# Patient Record
Sex: MALE | Race: WHITE | NOT HISPANIC OR LATINO
[De-identification: names, ages, dates, MRNs, and addresses within clinical notes are randomized per-mention and may not be internally consistent; named-entity substitution may affect disease eponyms.]

---

## 2017-01-17 ENCOUNTER — APPOINTMENT (OUTPATIENT)
Dept: ENDOCRINOLOGY | Facility: CLINIC | Age: 62
End: 2017-01-17

## 2017-01-17 VITALS
HEART RATE: 92 BPM | SYSTOLIC BLOOD PRESSURE: 140 MMHG | HEIGHT: 70 IN | BODY MASS INDEX: 35.36 KG/M2 | DIASTOLIC BLOOD PRESSURE: 80 MMHG | OXYGEN SATURATION: 98 % | WEIGHT: 247 LBS

## 2017-01-17 LAB
GLUCOSE BLDC GLUCOMTR-MCNC: 96
HBA1C MFR BLD HPLC: 7

## 2017-01-18 ENCOUNTER — MEDICATION RENEWAL (OUTPATIENT)
Age: 62
End: 2017-01-18

## 2017-01-18 LAB
ALBUMIN SERPL ELPH-MCNC: 4.9 G/DL
ALP BLD-CCNC: 73 U/L
ALT SERPL-CCNC: 42 U/L
AMYLASE/CREAT SERPL: 383 U/L
ANION GAP SERPL CALC-SCNC: 14 MMOL/L
AST SERPL-CCNC: 28 U/L
BASOPHILS # BLD AUTO: 0.01 K/UL
BASOPHILS NFR BLD AUTO: 0.1 %
BILIRUB SERPL-MCNC: 1.9 MG/DL
BUN SERPL-MCNC: 18 MG/DL
CALCIUM SERPL-MCNC: 10.3 MG/DL
CHLORIDE SERPL-SCNC: 102 MMOL/L
CHOLEST SERPL-MCNC: 119 MG/DL
CHOLEST/HDLC SERPL: 3.1 RATIO
CO2 SERPL-SCNC: 28 MMOL/L
CREAT SERPL-MCNC: 1.23 MG/DL
CREAT SPEC-SCNC: 57 MG/DL
EOSINOPHIL # BLD AUTO: 0.32 K/UL
EOSINOPHIL NFR BLD AUTO: 4.6 %
GLUCOSE SERPL-MCNC: 95 MG/DL
HCT VFR BLD CALC: 44.1 %
HDLC SERPL-MCNC: 38 MG/DL
HGB BLD-MCNC: 14.5 G/DL
IMM GRANULOCYTES NFR BLD AUTO: 0.1 %
LDLC SERPL CALC-MCNC: 55 MG/DL
LPL SERPL-CCNC: 1030 U/L
LYMPHOCYTES # BLD AUTO: 1.65 K/UL
LYMPHOCYTES NFR BLD AUTO: 23.8 %
MAN DIFF?: NORMAL
MCHC RBC-ENTMCNC: 30.3 PG
MCHC RBC-ENTMCNC: 32.9 GM/DL
MCV RBC AUTO: 92.1 FL
MICROALBUMIN 24H UR DL<=1MG/L-MCNC: 15.5 MG/DL
MICROALBUMIN/CREAT 24H UR-RTO: 273 UG/MG
MONOCYTES # BLD AUTO: 0.36 K/UL
MONOCYTES NFR BLD AUTO: 5.2 %
NEUTROPHILS # BLD AUTO: 4.58 K/UL
NEUTROPHILS NFR BLD AUTO: 66.2 %
PLATELET # BLD AUTO: 181 K/UL
POTASSIUM SERPL-SCNC: 5.1 MMOL/L
PROT SERPL-MCNC: 7.7 G/DL
PSA SERPL-MCNC: 1.11 NG/ML
RBC # BLD: 4.79 M/UL
RBC # FLD: 13.8 %
SODIUM SERPL-SCNC: 144 MMOL/L
TRIGL SERPL-MCNC: 128 MG/DL
TSH SERPL-ACNC: 2.95 UIU/ML
WBC # FLD AUTO: 6.93 K/UL

## 2017-01-22 ENCOUNTER — FORM ENCOUNTER (OUTPATIENT)
Age: 62
End: 2017-01-22

## 2017-01-23 ENCOUNTER — APPOINTMENT (OUTPATIENT)
Dept: CT IMAGING | Facility: CLINIC | Age: 62
End: 2017-01-23

## 2017-01-23 ENCOUNTER — OUTPATIENT (OUTPATIENT)
Dept: OUTPATIENT SERVICES | Facility: HOSPITAL | Age: 62
LOS: 1 days | End: 2017-01-23
Payer: COMMERCIAL

## 2017-01-23 DIAGNOSIS — Z00.8 ENCOUNTER FOR OTHER GENERAL EXAMINATION: ICD-10-CM

## 2017-01-23 PROCEDURE — 74177 CT ABD & PELVIS W/CONTRAST: CPT

## 2017-02-03 ENCOUNTER — APPOINTMENT (OUTPATIENT)
Dept: UROLOGY | Facility: CLINIC | Age: 62
End: 2017-02-03

## 2017-02-03 ENCOUNTER — FORM ENCOUNTER (OUTPATIENT)
Age: 62
End: 2017-02-03

## 2017-02-03 VITALS
TEMPERATURE: 98.2 F | SYSTOLIC BLOOD PRESSURE: 178 MMHG | HEIGHT: 70 IN | WEIGHT: 250 LBS | HEART RATE: 88 BPM | RESPIRATION RATE: 17 BRPM | DIASTOLIC BLOOD PRESSURE: 101 MMHG | BODY MASS INDEX: 35.79 KG/M2

## 2017-02-04 ENCOUNTER — APPOINTMENT (OUTPATIENT)
Dept: CT IMAGING | Facility: IMAGING CENTER | Age: 62
End: 2017-02-04

## 2017-02-04 ENCOUNTER — OUTPATIENT (OUTPATIENT)
Dept: OUTPATIENT SERVICES | Facility: HOSPITAL | Age: 62
LOS: 1 days | End: 2017-02-04
Payer: COMMERCIAL

## 2017-02-04 DIAGNOSIS — C64.9 MALIGNANT NEOPLASM OF UNSPECIFIED KIDNEY, EXCEPT RENAL PELVIS: ICD-10-CM

## 2017-02-04 PROCEDURE — 71250 CT THORAX DX C-: CPT

## 2017-03-01 ENCOUNTER — APPOINTMENT (OUTPATIENT)
Dept: UROLOGY | Facility: HOSPITAL | Age: 62
End: 2017-03-01

## 2017-03-03 ENCOUNTER — OTHER (OUTPATIENT)
Age: 62
End: 2017-03-03

## 2017-03-25 ENCOUNTER — MESSAGE (OUTPATIENT)
Age: 62
End: 2017-03-25

## 2017-03-28 ENCOUNTER — MEDICATION RENEWAL (OUTPATIENT)
Age: 62
End: 2017-03-28

## 2017-04-11 ENCOUNTER — RX RENEWAL (OUTPATIENT)
Age: 62
End: 2017-04-11

## 2017-04-24 ENCOUNTER — OUTPATIENT (OUTPATIENT)
Dept: OUTPATIENT SERVICES | Facility: HOSPITAL | Age: 62
LOS: 1 days | End: 2017-04-24
Payer: COMMERCIAL

## 2017-04-24 VITALS
HEIGHT: 70 IN | RESPIRATION RATE: 16 BRPM | OXYGEN SATURATION: 97 % | TEMPERATURE: 98 F | DIASTOLIC BLOOD PRESSURE: 88 MMHG | HEART RATE: 83 BPM | SYSTOLIC BLOOD PRESSURE: 156 MMHG | WEIGHT: 246.92 LBS

## 2017-04-24 DIAGNOSIS — C64.9 MALIGNANT NEOPLASM OF UNSPECIFIED KIDNEY, EXCEPT RENAL PELVIS: ICD-10-CM

## 2017-04-24 DIAGNOSIS — E11.9 TYPE 2 DIABETES MELLITUS WITHOUT COMPLICATIONS: ICD-10-CM

## 2017-04-24 DIAGNOSIS — E66.9 OBESITY, UNSPECIFIED: ICD-10-CM

## 2017-04-24 DIAGNOSIS — I10 ESSENTIAL (PRIMARY) HYPERTENSION: ICD-10-CM

## 2017-04-24 DIAGNOSIS — Z01.818 ENCOUNTER FOR OTHER PREPROCEDURAL EXAMINATION: ICD-10-CM

## 2017-04-24 LAB
ALBUMIN SERPL ELPH-MCNC: 4.9 G/DL — SIGNIFICANT CHANGE UP (ref 3.3–5)
ALP SERPL-CCNC: 63 U/L — SIGNIFICANT CHANGE UP (ref 40–120)
ALT FLD-CCNC: 43 U/L — HIGH (ref 4–41)
APPEARANCE UR: CLEAR — SIGNIFICANT CHANGE UP
AST SERPL-CCNC: 29 U/L — SIGNIFICANT CHANGE UP (ref 4–40)
BILIRUB SERPL-MCNC: 1.6 MG/DL — HIGH (ref 0.2–1.2)
BILIRUB UR-MCNC: NEGATIVE — SIGNIFICANT CHANGE UP
BLD GP AB SCN SERPL QL: NEGATIVE — SIGNIFICANT CHANGE UP
BLOOD UR QL VISUAL: NEGATIVE — SIGNIFICANT CHANGE UP
BUN SERPL-MCNC: 21 MG/DL — SIGNIFICANT CHANGE UP (ref 7–23)
CALCIUM SERPL-MCNC: 10.2 MG/DL — SIGNIFICANT CHANGE UP (ref 8.4–10.5)
CHLORIDE SERPL-SCNC: 98 MMOL/L — SIGNIFICANT CHANGE UP (ref 98–107)
CO2 SERPL-SCNC: 29 MMOL/L — SIGNIFICANT CHANGE UP (ref 22–31)
COLOR SPEC: SIGNIFICANT CHANGE UP
CREAT SERPL-MCNC: 1.2 MG/DL — SIGNIFICANT CHANGE UP (ref 0.5–1.3)
GLUCOSE SERPL-MCNC: 156 MG/DL — HIGH (ref 70–99)
GLUCOSE UR-MCNC: NEGATIVE — SIGNIFICANT CHANGE UP
HBA1C BLD-MCNC: 6.9 % — HIGH (ref 4–5.6)
HCT VFR BLD CALC: 43.6 % — SIGNIFICANT CHANGE UP (ref 39–50)
HGB BLD-MCNC: 14.4 G/DL — SIGNIFICANT CHANGE UP (ref 13–17)
KETONES UR-MCNC: NEGATIVE — SIGNIFICANT CHANGE UP
LEUKOCYTE ESTERASE UR-ACNC: NEGATIVE — SIGNIFICANT CHANGE UP
MCHC RBC-ENTMCNC: 30.3 PG — SIGNIFICANT CHANGE UP (ref 27–34)
MCHC RBC-ENTMCNC: 33 % — SIGNIFICANT CHANGE UP (ref 32–36)
MCV RBC AUTO: 91.8 FL — SIGNIFICANT CHANGE UP (ref 80–100)
NITRITE UR-MCNC: NEGATIVE — SIGNIFICANT CHANGE UP
PH UR: 6 — SIGNIFICANT CHANGE UP (ref 4.6–8)
PLATELET # BLD AUTO: 176 K/UL — SIGNIFICANT CHANGE UP (ref 150–400)
PMV BLD: 12.5 FL — SIGNIFICANT CHANGE UP (ref 7–13)
POTASSIUM SERPL-MCNC: 5.1 MMOL/L — SIGNIFICANT CHANGE UP (ref 3.5–5.3)
POTASSIUM SERPL-SCNC: 5.1 MMOL/L — SIGNIFICANT CHANGE UP (ref 3.5–5.3)
PROT SERPL-MCNC: 7.8 G/DL — SIGNIFICANT CHANGE UP (ref 6–8.3)
PROT UR-MCNC: 20 — SIGNIFICANT CHANGE UP
RBC # BLD: 4.75 M/UL — SIGNIFICANT CHANGE UP (ref 4.2–5.8)
RBC # FLD: 13.3 % — SIGNIFICANT CHANGE UP (ref 10.3–14.5)
RBC CASTS # UR COMP ASSIST: SIGNIFICANT CHANGE UP (ref 0–?)
RH IG SCN BLD-IMP: POSITIVE — SIGNIFICANT CHANGE UP
SODIUM SERPL-SCNC: 141 MMOL/L — SIGNIFICANT CHANGE UP (ref 135–145)
SP GR SPEC: 1.01 — SIGNIFICANT CHANGE UP (ref 1–1.03)
UROBILINOGEN FLD QL: NORMAL E.U. — SIGNIFICANT CHANGE UP (ref 0.1–0.2)
WBC # BLD: 5.61 K/UL — SIGNIFICANT CHANGE UP (ref 3.8–10.5)
WBC # FLD AUTO: 5.61 K/UL — SIGNIFICANT CHANGE UP (ref 3.8–10.5)
WBC UR QL: SIGNIFICANT CHANGE UP (ref 0–?)

## 2017-04-24 PROCEDURE — 93010 ELECTROCARDIOGRAM REPORT: CPT

## 2017-04-24 NOTE — H&P PST ADULT - LYMPHATIC
anterior cervical L/posterior cervical R/anterior cervical R/posterior cervical L/supraclavicular R/supraclavicular L

## 2017-04-24 NOTE — H&P PST ADULT - VISION (WITH CORRECTIVE LENSES IF THE PATIENT USUALLY WEARS THEM):
Partially impaired: cannot see medication labels or newsprint, but can see obstacles in path, and the surrounding layout; can count fingers at arm's length/readers

## 2017-04-24 NOTE — H&P PST ADULT - NEGATIVE GENERAL GENITOURINARY SYMPTOMS
no dysuria/no bladder infections/no renal colic/no hematuria mass on my left kidney, refer to hpi/no dysuria/no bladder infections/no hematuria/no renal colic

## 2017-04-24 NOTE — H&P PST ADULT - PROBLEM SELECTOR PLAN 4
Fs on admit  pt instructed to take Lantus 28units 5/2/17 & last oral diabetic medications on 5/2/17  pt verbalized understanding

## 2017-04-24 NOTE — H&P PST ADULT - HISTORY OF PRESENT ILLNESS
62y/o male presents for preop eval for scheduled laparoscopic left partial nephrectomy, possible open radial on 5/3/17.  Pt states incidental finding on imaging for elevated liver enzymes few months ago.  Preop dx Malignant neoplasm of kidney.

## 2017-04-24 NOTE — H&P PST ADULT - NSANTHOSAYNRD_GEN_A_CORE
No. JOSÉ MIGUEL screening performed.  STOP BANG Legend: 0-2 = LOW Risk; 3-4 = INTERMEDIATE Risk; 5-8 = HIGH Risk

## 2017-04-24 NOTE — H&P PST ADULT - FAMILY HISTORY
Mother  Still living? Yes, Estimated age: Age Unknown  Family history of hypertension, Age at diagnosis: Age Unknown     Father  Still living? Unknown  Family history of diabetes mellitus, Age at diagnosis: Age Unknown

## 2017-04-24 NOTE — H&P PST ADULT - PROBLEM SELECTOR PLAN 3
pt with three positives on stop bang questionnaire  JOSÉ MIGUEL precaution recommended   OR booking notified via fax

## 2017-04-24 NOTE — H&P PST ADULT - PMH
Diabetes mellitus  type II  Dyslipidemia    Hypertension    Malignant neoplasm of unspecified kidney, except renal pelvis    Obesity

## 2017-04-24 NOTE — H&P PST ADULT - PROBLEM SELECTOR PLAN 1
scheduled laparoscopic left partial nephrectomy, possible open radial on 5/3/17.  preop instructions, gi prophylaxis & surgical soap given  pt verbalized understanding  abo on admit

## 2017-04-24 NOTE — H&P PST ADULT - ABILITY TO HEAR (WITH HEARING AID OR HEARING APPLIANCE IF NORMALLY USED):
mild hearing loss in right ear/Mildly to Moderately Impaired: difficulty hearing in some environments or speaker may need to increase volume or speak distinctly

## 2017-04-26 ENCOUNTER — MEDICATION RENEWAL (OUTPATIENT)
Age: 62
End: 2017-04-26

## 2017-04-26 ENCOUNTER — APPOINTMENT (OUTPATIENT)
Dept: INTERNAL MEDICINE | Facility: CLINIC | Age: 62
End: 2017-04-26

## 2017-04-26 VITALS
TEMPERATURE: 98 F | HEIGHT: 70 IN | DIASTOLIC BLOOD PRESSURE: 90 MMHG | HEART RATE: 80 BPM | WEIGHT: 241 LBS | BODY MASS INDEX: 34.5 KG/M2 | OXYGEN SATURATION: 98 % | RESPIRATION RATE: 17 BRPM | SYSTOLIC BLOOD PRESSURE: 152 MMHG

## 2017-04-26 LAB
BACTERIA UR CULT: SIGNIFICANT CHANGE UP
SPECIMEN SOURCE: SIGNIFICANT CHANGE UP

## 2017-05-03 ENCOUNTER — INPATIENT (INPATIENT)
Facility: HOSPITAL | Age: 62
LOS: 1 days | Discharge: HOME | End: 2017-05-05
Attending: UROLOGY | Admitting: UROLOGY
Payer: COMMERCIAL

## 2017-05-03 ENCOUNTER — APPOINTMENT (OUTPATIENT)
Dept: UROLOGY | Facility: HOSPITAL | Age: 62
End: 2017-05-03

## 2017-05-03 ENCOUNTER — MESSAGE (OUTPATIENT)
Age: 62
End: 2017-05-03

## 2017-05-03 ENCOUNTER — RESULT REVIEW (OUTPATIENT)
Age: 62
End: 2017-05-03

## 2017-05-03 VITALS
TEMPERATURE: 98 F | HEART RATE: 79 BPM | RESPIRATION RATE: 14 BRPM | SYSTOLIC BLOOD PRESSURE: 156 MMHG | HEIGHT: 70 IN | DIASTOLIC BLOOD PRESSURE: 83 MMHG | WEIGHT: 246.92 LBS | OXYGEN SATURATION: 97 %

## 2017-05-03 DIAGNOSIS — C64.9 MALIGNANT NEOPLASM OF UNSPECIFIED KIDNEY, EXCEPT RENAL PELVIS: ICD-10-CM

## 2017-05-03 LAB — RH IG SCN BLD-IMP: POSITIVE — SIGNIFICANT CHANGE UP

## 2017-05-03 PROCEDURE — 76998 US GUIDE INTRAOP: CPT | Mod: 26

## 2017-05-03 PROCEDURE — 88331 PATH CONSLTJ SURG 1 BLK 1SPC: CPT | Mod: 26

## 2017-05-03 PROCEDURE — 88305 TISSUE EXAM BY PATHOLOGIST: CPT | Mod: 26

## 2017-05-03 PROCEDURE — 50543 LAPARO PARTIAL NEPHRECTOMY: CPT | Mod: LT

## 2017-05-03 PROCEDURE — 88307 TISSUE EXAM BY PATHOLOGIST: CPT | Mod: 26

## 2017-05-03 RX ORDER — OXYCODONE HYDROCHLORIDE 5 MG/1
5 TABLET ORAL EVERY 4 HOURS
Qty: 0 | Refills: 0 | Status: DISCONTINUED | OUTPATIENT
Start: 2017-05-03 | End: 2017-05-05

## 2017-05-03 RX ORDER — OXYCODONE HYDROCHLORIDE 5 MG/1
10 TABLET ORAL EVERY 4 HOURS
Qty: 0 | Refills: 0 | Status: DISCONTINUED | OUTPATIENT
Start: 2017-05-03 | End: 2017-05-05

## 2017-05-03 RX ORDER — DEXTROSE 50 % IN WATER 50 %
25 SYRINGE (ML) INTRAVENOUS ONCE
Qty: 0 | Refills: 0 | Status: DISCONTINUED | OUTPATIENT
Start: 2017-05-03 | End: 2017-05-05

## 2017-05-03 RX ORDER — DEXTROSE 50 % IN WATER 50 %
1 SYRINGE (ML) INTRAVENOUS ONCE
Qty: 0 | Refills: 0 | Status: DISCONTINUED | OUTPATIENT
Start: 2017-05-03 | End: 2017-05-05

## 2017-05-03 RX ORDER — DEXTROSE 50 % IN WATER 50 %
12.5 SYRINGE (ML) INTRAVENOUS ONCE
Qty: 0 | Refills: 0 | Status: DISCONTINUED | OUTPATIENT
Start: 2017-05-03 | End: 2017-05-05

## 2017-05-03 RX ORDER — HEPARIN SODIUM 5000 [USP'U]/ML
5000 INJECTION INTRAVENOUS; SUBCUTANEOUS EVERY 8 HOURS
Qty: 0 | Refills: 0 | Status: DISCONTINUED | OUTPATIENT
Start: 2017-05-03 | End: 2017-05-05

## 2017-05-03 RX ORDER — SODIUM CHLORIDE 9 MG/ML
1000 INJECTION, SOLUTION INTRAVENOUS
Qty: 0 | Refills: 0 | Status: DISCONTINUED | OUTPATIENT
Start: 2017-05-03 | End: 2017-05-05

## 2017-05-03 RX ORDER — ACETAMINOPHEN 500 MG
650 TABLET ORAL EVERY 6 HOURS
Qty: 0 | Refills: 0 | Status: DISCONTINUED | OUTPATIENT
Start: 2017-05-03 | End: 2017-05-05

## 2017-05-03 RX ORDER — ONDANSETRON 8 MG/1
4 TABLET, FILM COATED ORAL ONCE
Qty: 0 | Refills: 0 | Status: DISCONTINUED | OUTPATIENT
Start: 2017-05-03 | End: 2017-05-03

## 2017-05-03 RX ORDER — INSULIN LISPRO 100/ML
VIAL (ML) SUBCUTANEOUS AT BEDTIME
Qty: 0 | Refills: 0 | Status: DISCONTINUED | OUTPATIENT
Start: 2017-05-03 | End: 2017-05-05

## 2017-05-03 RX ORDER — SODIUM CHLORIDE 9 MG/ML
1000 INJECTION INTRAMUSCULAR; INTRAVENOUS; SUBCUTANEOUS ONCE
Qty: 0 | Refills: 0 | Status: COMPLETED | OUTPATIENT
Start: 2017-05-03 | End: 2017-05-03

## 2017-05-03 RX ORDER — ONDANSETRON 8 MG/1
4 TABLET, FILM COATED ORAL EVERY 6 HOURS
Qty: 0 | Refills: 0 | Status: DISCONTINUED | OUTPATIENT
Start: 2017-05-03 | End: 2017-05-05

## 2017-05-03 RX ORDER — INSULIN LISPRO 100/ML
VIAL (ML) SUBCUTANEOUS
Qty: 0 | Refills: 0 | Status: DISCONTINUED | OUTPATIENT
Start: 2017-05-03 | End: 2017-05-05

## 2017-05-03 RX ORDER — HYDROMORPHONE HYDROCHLORIDE 2 MG/ML
0.5 INJECTION INTRAMUSCULAR; INTRAVENOUS; SUBCUTANEOUS EVERY 4 HOURS
Qty: 0 | Refills: 0 | Status: DISCONTINUED | OUTPATIENT
Start: 2017-05-03 | End: 2017-05-03

## 2017-05-03 RX ORDER — SODIUM CHLORIDE 9 MG/ML
1000 INJECTION, SOLUTION INTRAVENOUS
Qty: 0 | Refills: 0 | Status: DISCONTINUED | OUTPATIENT
Start: 2017-05-03 | End: 2017-05-04

## 2017-05-03 RX ORDER — HYDROMORPHONE HYDROCHLORIDE 2 MG/ML
0.5 INJECTION INTRAMUSCULAR; INTRAVENOUS; SUBCUTANEOUS
Qty: 0 | Refills: 0 | Status: DISCONTINUED | OUTPATIENT
Start: 2017-05-03 | End: 2017-05-05

## 2017-05-03 RX ORDER — LISINOPRIL 2.5 MG/1
20 TABLET ORAL DAILY
Qty: 0 | Refills: 0 | Status: DISCONTINUED | OUTPATIENT
Start: 2017-05-03 | End: 2017-05-04

## 2017-05-03 RX ORDER — GLUCAGON INJECTION, SOLUTION 0.5 MG/.1ML
1 INJECTION, SOLUTION SUBCUTANEOUS ONCE
Qty: 0 | Refills: 0 | Status: DISCONTINUED | OUTPATIENT
Start: 2017-05-03 | End: 2017-05-05

## 2017-05-03 RX ORDER — INSULIN GLARGINE 100 [IU]/ML
12 INJECTION, SOLUTION SUBCUTANEOUS AT BEDTIME
Qty: 0 | Refills: 0 | Status: DISCONTINUED | OUTPATIENT
Start: 2017-05-03 | End: 2017-05-04

## 2017-05-03 RX ORDER — DOCUSATE SODIUM 100 MG
100 CAPSULE ORAL THREE TIMES A DAY
Qty: 0 | Refills: 0 | Status: DISCONTINUED | OUTPATIENT
Start: 2017-05-03 | End: 2017-05-05

## 2017-05-03 RX ORDER — SENNA PLUS 8.6 MG/1
2 TABLET ORAL AT BEDTIME
Qty: 0 | Refills: 0 | Status: DISCONTINUED | OUTPATIENT
Start: 2017-05-03 | End: 2017-05-05

## 2017-05-03 RX ORDER — ATORVASTATIN CALCIUM 80 MG/1
40 TABLET, FILM COATED ORAL AT BEDTIME
Qty: 0 | Refills: 0 | Status: DISCONTINUED | OUTPATIENT
Start: 2017-05-03 | End: 2017-05-05

## 2017-05-03 RX ORDER — SODIUM CHLORIDE 9 MG/ML
1000 INJECTION, SOLUTION INTRAVENOUS
Qty: 0 | Refills: 0 | Status: DISCONTINUED | OUTPATIENT
Start: 2017-05-03 | End: 2017-05-03

## 2017-05-03 RX ADMIN — SODIUM CHLORIDE 1000 MILLILITER(S): 9 INJECTION INTRAMUSCULAR; INTRAVENOUS; SUBCUTANEOUS at 23:40

## 2017-05-03 RX ADMIN — HEPARIN SODIUM 5000 UNIT(S): 5000 INJECTION INTRAVENOUS; SUBCUTANEOUS at 22:06

## 2017-05-03 RX ADMIN — ATORVASTATIN CALCIUM 40 MILLIGRAM(S): 80 TABLET, FILM COATED ORAL at 22:06

## 2017-05-03 RX ADMIN — SODIUM CHLORIDE 125 MILLILITER(S): 9 INJECTION, SOLUTION INTRAVENOUS at 14:39

## 2017-05-03 RX ADMIN — Medication 100 MILLIGRAM(S): at 22:06

## 2017-05-03 RX ADMIN — Medication 1 TABLET(S): at 22:06

## 2017-05-03 RX ADMIN — INSULIN GLARGINE 12 UNIT(S): 100 INJECTION, SOLUTION SUBCUTANEOUS at 23:24

## 2017-05-03 NOTE — ASU PATIENT PROFILE, ADULT - ABILITY TO HEAR (WITH HEARING AID OR HEARING APPLIANCE IF NORMALLY USED):
Mildly to Moderately Impaired: difficulty hearing in some environments or speaker may need to increase volume or speak distinctly/mild hearing loss in right ear mild hearing loss in right ear/Adequate: hears normal conversation without difficulty

## 2017-05-03 NOTE — ASU PATIENT PROFILE, ADULT - VISION (WITH CORRECTIVE LENSES IF THE PATIENT USUALLY WEARS THEM):
Partially impaired: cannot see medication labels or newsprint, but can see obstacles in path, and the surrounding layout; can count fingers at arm's length/readers Normal vision: sees adequately in most situations; can see medication labels, newsprint/readers

## 2017-05-04 ENCOUNTER — TRANSCRIPTION ENCOUNTER (OUTPATIENT)
Age: 62
End: 2017-05-04

## 2017-05-04 LAB
BASOPHILS # BLD AUTO: 0 K/UL — SIGNIFICANT CHANGE UP (ref 0–0.2)
BASOPHILS # BLD AUTO: 0.01 K/UL — SIGNIFICANT CHANGE UP (ref 0–0.2)
BASOPHILS NFR BLD AUTO: 0 % — SIGNIFICANT CHANGE UP (ref 0–2)
BASOPHILS NFR BLD AUTO: 0.1 % — SIGNIFICANT CHANGE UP (ref 0–2)
BUN SERPL-MCNC: 36 MG/DL — HIGH (ref 7–23)
BUN SERPL-MCNC: 41 MG/DL — HIGH (ref 7–23)
CALCIUM SERPL-MCNC: 8.3 MG/DL — LOW (ref 8.4–10.5)
CALCIUM SERPL-MCNC: 8.8 MG/DL — SIGNIFICANT CHANGE UP (ref 8.4–10.5)
CHLORIDE SERPL-SCNC: 103 MMOL/L — SIGNIFICANT CHANGE UP (ref 98–107)
CHLORIDE SERPL-SCNC: 98 MMOL/L — SIGNIFICANT CHANGE UP (ref 98–107)
CO2 SERPL-SCNC: 25 MMOL/L — SIGNIFICANT CHANGE UP (ref 22–31)
CO2 SERPL-SCNC: 27 MMOL/L — SIGNIFICANT CHANGE UP (ref 22–31)
CREAT SERPL-MCNC: 1.78 MG/DL — HIGH (ref 0.5–1.3)
CREAT SERPL-MCNC: 1.89 MG/DL — HIGH (ref 0.5–1.3)
EOSINOPHIL # BLD AUTO: 0 K/UL — SIGNIFICANT CHANGE UP (ref 0–0.5)
EOSINOPHIL # BLD AUTO: 0 K/UL — SIGNIFICANT CHANGE UP (ref 0–0.5)
EOSINOPHIL NFR BLD AUTO: 0 % — SIGNIFICANT CHANGE UP (ref 0–6)
EOSINOPHIL NFR BLD AUTO: 0 % — SIGNIFICANT CHANGE UP (ref 0–6)
GLUCOSE SERPL-MCNC: 227 MG/DL — HIGH (ref 70–99)
GLUCOSE SERPL-MCNC: 244 MG/DL — HIGH (ref 70–99)
HCT VFR BLD CALC: 29.4 % — LOW (ref 39–50)
HCT VFR BLD CALC: 31.6 % — LOW (ref 39–50)
HGB BLD-MCNC: 10.4 G/DL — LOW (ref 13–17)
HGB BLD-MCNC: 9.9 G/DL — LOW (ref 13–17)
IMM GRANULOCYTES NFR BLD AUTO: 0.2 % — SIGNIFICANT CHANGE UP (ref 0–1.5)
IMM GRANULOCYTES NFR BLD AUTO: 0.4 % — SIGNIFICANT CHANGE UP (ref 0–1.5)
LYMPHOCYTES # BLD AUTO: 0.96 K/UL — LOW (ref 1–3.3)
LYMPHOCYTES # BLD AUTO: 1.36 K/UL — SIGNIFICANT CHANGE UP (ref 1–3.3)
LYMPHOCYTES # BLD AUTO: 16.3 % — SIGNIFICANT CHANGE UP (ref 13–44)
LYMPHOCYTES # BLD AUTO: 8.6 % — LOW (ref 13–44)
MCHC RBC-ENTMCNC: 30.3 PG — SIGNIFICANT CHANGE UP (ref 27–34)
MCHC RBC-ENTMCNC: 30.7 PG — SIGNIFICANT CHANGE UP (ref 27–34)
MCHC RBC-ENTMCNC: 32.9 % — SIGNIFICANT CHANGE UP (ref 32–36)
MCHC RBC-ENTMCNC: 33.7 % — SIGNIFICANT CHANGE UP (ref 32–36)
MCV RBC AUTO: 91.3 FL — SIGNIFICANT CHANGE UP (ref 80–100)
MCV RBC AUTO: 92.1 FL — SIGNIFICANT CHANGE UP (ref 80–100)
MONOCYTES # BLD AUTO: 0.49 K/UL — SIGNIFICANT CHANGE UP (ref 0–0.9)
MONOCYTES # BLD AUTO: 0.63 K/UL — SIGNIFICANT CHANGE UP (ref 0–0.9)
MONOCYTES NFR BLD AUTO: 4.4 % — SIGNIFICANT CHANGE UP (ref 2–14)
MONOCYTES NFR BLD AUTO: 7.5 % — SIGNIFICANT CHANGE UP (ref 2–14)
NEUTROPHILS # BLD AUTO: 6.34 K/UL — SIGNIFICANT CHANGE UP (ref 1.8–7.4)
NEUTROPHILS # BLD AUTO: 9.72 K/UL — HIGH (ref 1.8–7.4)
NEUTROPHILS NFR BLD AUTO: 75.9 % — SIGNIFICANT CHANGE UP (ref 43–77)
NEUTROPHILS NFR BLD AUTO: 86.6 % — HIGH (ref 43–77)
PLATELET # BLD AUTO: 162 K/UL — SIGNIFICANT CHANGE UP (ref 150–400)
PLATELET # BLD AUTO: 171 K/UL — SIGNIFICANT CHANGE UP (ref 150–400)
PMV BLD: 12.1 FL — SIGNIFICANT CHANGE UP (ref 7–13)
PMV BLD: 12.3 FL — SIGNIFICANT CHANGE UP (ref 7–13)
POTASSIUM SERPL-MCNC: 5.4 MMOL/L — HIGH (ref 3.5–5.3)
POTASSIUM SERPL-MCNC: 5.5 MMOL/L — HIGH (ref 3.5–5.3)
POTASSIUM SERPL-SCNC: 5.4 MMOL/L — HIGH (ref 3.5–5.3)
POTASSIUM SERPL-SCNC: 5.5 MMOL/L — HIGH (ref 3.5–5.3)
RBC # BLD: 3.22 M/UL — LOW (ref 4.2–5.8)
RBC # BLD: 3.43 M/UL — LOW (ref 4.2–5.8)
RBC # FLD: 13.1 % — SIGNIFICANT CHANGE UP (ref 10.3–14.5)
RBC # FLD: 13.1 % — SIGNIFICANT CHANGE UP (ref 10.3–14.5)
SODIUM SERPL-SCNC: 136 MMOL/L — SIGNIFICANT CHANGE UP (ref 135–145)
SODIUM SERPL-SCNC: 139 MMOL/L — SIGNIFICANT CHANGE UP (ref 135–145)
WBC # BLD: 11.21 K/UL — HIGH (ref 3.8–10.5)
WBC # BLD: 8.36 K/UL — SIGNIFICANT CHANGE UP (ref 3.8–10.5)
WBC # FLD AUTO: 11.21 K/UL — HIGH (ref 3.8–10.5)
WBC # FLD AUTO: 8.36 K/UL — SIGNIFICANT CHANGE UP (ref 3.8–10.5)

## 2017-05-04 PROCEDURE — 99223 1ST HOSP IP/OBS HIGH 75: CPT

## 2017-05-04 RX ORDER — SENNA PLUS 8.6 MG/1
2 TABLET ORAL
Qty: 0 | Refills: 0 | DISCHARGE
Start: 2017-05-04

## 2017-05-04 RX ORDER — OXYCODONE HYDROCHLORIDE 5 MG/1
2 TABLET ORAL
Qty: 24 | Refills: 0
Start: 2017-05-04 | End: 2017-05-07

## 2017-05-04 RX ORDER — INSULIN GLARGINE 100 [IU]/ML
30 INJECTION, SOLUTION SUBCUTANEOUS AT BEDTIME
Qty: 0 | Refills: 0 | Status: DISCONTINUED | OUTPATIENT
Start: 2017-05-04 | End: 2017-05-05

## 2017-05-04 RX ORDER — SODIUM CHLORIDE 9 MG/ML
1000 INJECTION INTRAMUSCULAR; INTRAVENOUS; SUBCUTANEOUS
Qty: 0 | Refills: 0 | Status: DISCONTINUED | OUTPATIENT
Start: 2017-05-04 | End: 2017-05-05

## 2017-05-04 RX ORDER — LISINOPRIL 2.5 MG/1
1 TABLET ORAL
Qty: 0 | Refills: 0 | COMMUNITY

## 2017-05-04 RX ORDER — DOCUSATE SODIUM 100 MG
1 CAPSULE ORAL
Qty: 0 | Refills: 0 | DISCHARGE
Start: 2017-05-04

## 2017-05-04 RX ADMIN — Medication 100 MILLIGRAM(S): at 14:08

## 2017-05-04 RX ADMIN — OXYCODONE HYDROCHLORIDE 10 MILLIGRAM(S): 5 TABLET ORAL at 22:10

## 2017-05-04 RX ADMIN — Medication 100 MILLIGRAM(S): at 06:09

## 2017-05-04 RX ADMIN — SODIUM CHLORIDE 125 MILLILITER(S): 9 INJECTION INTRAMUSCULAR; INTRAVENOUS; SUBCUTANEOUS at 14:08

## 2017-05-04 RX ADMIN — Medication 10 MILLIGRAM(S): at 07:28

## 2017-05-04 RX ADMIN — HEPARIN SODIUM 5000 UNIT(S): 5000 INJECTION INTRAVENOUS; SUBCUTANEOUS at 06:09

## 2017-05-04 RX ADMIN — OXYCODONE HYDROCHLORIDE 5 MILLIGRAM(S): 5 TABLET ORAL at 11:24

## 2017-05-04 RX ADMIN — Medication 4: at 13:29

## 2017-05-04 RX ADMIN — Medication 6: at 09:12

## 2017-05-04 RX ADMIN — Medication 100 MILLIGRAM(S): at 21:36

## 2017-05-04 RX ADMIN — OXYCODONE HYDROCHLORIDE 5 MILLIGRAM(S): 5 TABLET ORAL at 12:20

## 2017-05-04 RX ADMIN — ATORVASTATIN CALCIUM 40 MILLIGRAM(S): 80 TABLET, FILM COATED ORAL at 21:35

## 2017-05-04 RX ADMIN — OXYCODONE HYDROCHLORIDE 10 MILLIGRAM(S): 5 TABLET ORAL at 16:38

## 2017-05-04 RX ADMIN — Medication 1 TABLET(S): at 11:24

## 2017-05-04 RX ADMIN — HEPARIN SODIUM 5000 UNIT(S): 5000 INJECTION INTRAVENOUS; SUBCUTANEOUS at 21:35

## 2017-05-04 RX ADMIN — INSULIN GLARGINE 30 UNIT(S): 100 INJECTION, SOLUTION SUBCUTANEOUS at 22:38

## 2017-05-04 RX ADMIN — OXYCODONE HYDROCHLORIDE 10 MILLIGRAM(S): 5 TABLET ORAL at 17:23

## 2017-05-04 RX ADMIN — Medication 6: at 17:43

## 2017-05-04 RX ADMIN — HEPARIN SODIUM 5000 UNIT(S): 5000 INJECTION INTRAVENOUS; SUBCUTANEOUS at 14:08

## 2017-05-04 RX ADMIN — OXYCODONE HYDROCHLORIDE 10 MILLIGRAM(S): 5 TABLET ORAL at 21:36

## 2017-05-04 NOTE — DISCHARGE NOTE ADULT - PLAN OF CARE
Surgical Recovery Drink plenty of fluids.  No heavy lifting (greater than 10 pounds) or straining for 4 to 6 weeks.  You may shower, just pat white strips dry, they will fall off in 10 to 14 days.  Do not drive when taking pain medication.  Call the office if you have fever greater than 101, difficulty urinating, pain not relieved with pain medication, nausea/vomiting.

## 2017-05-04 NOTE — DISCHARGE NOTE ADULT - CARE PROVIDER_API CALL
Dustin Reed), Urology  [address] [place] [address]  Phone: [phone]  Fax: [phone]

## 2017-05-04 NOTE — DISCHARGE NOTE ADULT - MEDICATION SUMMARY - MEDICATIONS TO STOP TAKING
I will STOP taking the medications listed below when I get home from the hospital:    lisinopril 20 mg oral tablet  -- 1 tab(s) by mouth once a day in am

## 2017-05-04 NOTE — DISCHARGE NOTE ADULT - OTHER SIGNIFICANT FINDINGS
**  Do not continue taking your Lisinopril until you follow up with your PMD and have your kidney funtion checked. (labwork) ***

## 2017-05-04 NOTE — DISCHARGE NOTE ADULT - HOSPITAL COURSE
Underwent uncomplicated Left  lap partial nephrectomy on 5/3 .  Postoperative course uneventful except for one episode of hypotension and dizziness which responded to an IV fluid bolus in the immediate post op period.  In addition, pt's creatinine was elevated after his procedure and and as a result his  Lisinopril will be held until f/u with his PMD.   Successful TOV on POD #1,  pain controlled, ambulating.  Return of GI function on POD #1 , diet advanced without incident.   Pt d/c on POD #1 to f/u with Dr. Reed. [phone] Underwent uncomplicated Left  lap partial nephrectomy on 5/3 .  Postoperative course uneventful except for one episode of hypotension and dizziness which responded to an IV fluid bolus in the immediate post op period.  In addition, pt's creatinine was elevated after his procedure and and as a result his  Lisinopril will be held until f/u with his PMD.   Successful TOV on POD #1,  pain controlled, ambulating.  Return of GI function on POD #1 , diet advanced without incident.   Pt d/c on POD #2 to f/u with Dr. Reed. [phone]

## 2017-05-04 NOTE — DISCHARGE NOTE ADULT - INSTRUCTIONS
Drink plenty of fluids.  No heavy lifting (greater than 10 pounds) or straining for 4 to 6 weeks.  You may shower, just pat white strips dry, they will fall off in 10 to 14 days.  Do not drive when taking pain medication.  Call Dr. Reed's nurse practitioner, Jeni Joseph NP for pathology results and to schedule a follow up appointment in 3 weeks.  Call the office if you have fever greater than 101, difficulty urinating, pain not relieved with pain medication, nausea/vomiting. Call MD for any complain of difficulty urinating, bloody urine, fever and a return appointment. Call MD for any complain of difficulty urinating, bloody urine, fever and a return appointment.  Abdominal steri strip covered with 4x4 gauze and tape.

## 2017-05-04 NOTE — DISCHARGE NOTE ADULT - CARE PLAN
Principal Discharge DX:	Malignant neoplasm of unspecified kidney, except renal pelvis  Goal:	Surgical Recovery  Instructions for follow-up, activity and diet:	Drink plenty of fluids.  No heavy lifting (greater than 10 pounds) or straining for 4 to 6 weeks.  You may shower, just pat white strips dry, they will fall off in 10 to 14 days.  Do not drive when taking pain medication.  Call the office if you have fever greater than 101, difficulty urinating, pain not relieved with pain medication, nausea/vomiting.

## 2017-05-04 NOTE — DISCHARGE NOTE ADULT - CONDITIONS AT DISCHARGE
Tolerated po and fluids.  +void. Tolerated po and fluids.  +void.  Abdominal steri strip clean dry and intact covered with 4x4 gauze tape.

## 2017-05-04 NOTE — DISCHARGE NOTE ADULT - PATIENT PORTAL LINK FT
“You can access the FollowHealth Patient Portal, offered by [facility], by registering with the following website: [email or web address]”

## 2017-05-04 NOTE — DISCHARGE NOTE ADULT - MEDICATION SUMMARY - MEDICATIONS TO TAKE
I will START or STAY ON the medications listed below when I get home from the hospital:    acetaminophen-oxycodone 325 mg-5 mg oral tablet  -- 2 tab(s) by mouth every 6 hours, As Needed MDD:8  -- Caution federal law prohibits the transfer of this drug to any person other  than the person for whom it was prescribed.  May cause drowsiness.  Alcohol may intensify this effect.  Use care when operating dangerous machinery.  This prescription cannot be refilled.  This product contains acetaminophen.  Do not use  with any other product containing acetaminophen to prevent possible liver damage.  Using more of this medication than prescribed may cause serious breathing problems.    -- Indication: For pain    glipiZIDE 10 mg oral tablet  -- 1 tab(s) by mouth 2 times a day  -- Indication: For Home med    metFORMIN 1000 mg oral tablet  -- 1 tab(s) by mouth 2 times a day  -- Indication: For Home med    Lantus Solostar Pen 100 units/mL subcutaneous solution  -- 35 unit(s) subcutaneous once a day (at bedtime)  -- Indication: For Home med    atorvastatin 40 mg oral tablet  -- 1 tab(s) by mouth once a day in pm  -- Indication: For Home med    senna oral tablet  -- 2 tab(s) by mouth once a day (at bedtime), As needed, Constipation  -- Indication: For if needed    docusate sodium 100 mg oral capsule  -- 1 cap(s) by mouth 3 times a day  -- Indication: For same    multivitamin  -- 1 tab(s) by mouth once a day in am last dose 4/24/17  -- Indication: For Home med    Vitamin B12  -- 1 tab(s) by mouth once a day in am  -- Indication: For Homemed

## 2017-05-04 NOTE — DISCHARGE NOTE ADULT - ADDITIONAL INSTRUCTIONS
Call Dr. Reed's nurse practitioner, Jeni Joseph NP for pathology results and to schedule a follow up appointment in 3 weeks.   [phone]    **See your PMD within 1 week to have your kidney function checked and discuss restarting your Lisinopril **

## 2017-05-05 ENCOUNTER — TRANSCRIPTION ENCOUNTER (OUTPATIENT)
Age: 62
End: 2017-05-05

## 2017-05-05 VITALS
SYSTOLIC BLOOD PRESSURE: 145 MMHG | HEART RATE: 94 BPM | DIASTOLIC BLOOD PRESSURE: 63 MMHG | TEMPERATURE: 98 F | RESPIRATION RATE: 17 BRPM | OXYGEN SATURATION: 100 %

## 2017-05-05 RX ADMIN — HEPARIN SODIUM 5000 UNIT(S): 5000 INJECTION INTRAVENOUS; SUBCUTANEOUS at 06:01

## 2017-05-05 RX ADMIN — OXYCODONE HYDROCHLORIDE 10 MILLIGRAM(S): 5 TABLET ORAL at 06:35

## 2017-05-05 RX ADMIN — Medication 4: at 08:48

## 2017-05-05 RX ADMIN — OXYCODONE HYDROCHLORIDE 10 MILLIGRAM(S): 5 TABLET ORAL at 06:01

## 2017-05-05 RX ADMIN — Medication 100 MILLIGRAM(S): at 06:00

## 2017-05-09 LAB — SURGICAL PATHOLOGY STUDY: SIGNIFICANT CHANGE UP

## 2017-05-11 ENCOUNTER — APPOINTMENT (OUTPATIENT)
Dept: ENDOCRINOLOGY | Facility: CLINIC | Age: 62
End: 2017-05-11

## 2017-05-11 VITALS
BODY MASS INDEX: 34.65 KG/M2 | DIASTOLIC BLOOD PRESSURE: 70 MMHG | HEART RATE: 95 BPM | HEIGHT: 70 IN | SYSTOLIC BLOOD PRESSURE: 120 MMHG | OXYGEN SATURATION: 97 % | WEIGHT: 242 LBS

## 2017-05-15 ENCOUNTER — APPOINTMENT (OUTPATIENT)
Dept: INTERNAL MEDICINE | Facility: CLINIC | Age: 62
End: 2017-05-15

## 2017-05-15 VITALS
SYSTOLIC BLOOD PRESSURE: 144 MMHG | WEIGHT: 242 LBS | TEMPERATURE: 98 F | OXYGEN SATURATION: 96 % | DIASTOLIC BLOOD PRESSURE: 80 MMHG | HEART RATE: 87 BPM | RESPIRATION RATE: 17 BRPM | BODY MASS INDEX: 34.65 KG/M2 | HEIGHT: 70 IN

## 2017-05-15 RX ORDER — AMLODIPINE AND ATORVASTATIN 10; 40 MG/1; MG/1
10-40 TABLET, COATED ORAL
Qty: 90 | Refills: 1 | Status: DISCONTINUED | COMMUNITY
Start: 2017-05-05 | End: 2017-05-15

## 2017-06-01 ENCOUNTER — RX RENEWAL (OUTPATIENT)
Age: 62
End: 2017-06-01

## 2017-06-02 ENCOUNTER — APPOINTMENT (OUTPATIENT)
Dept: INTERNAL MEDICINE | Facility: CLINIC | Age: 62
End: 2017-06-02

## 2017-06-02 ENCOUNTER — APPOINTMENT (OUTPATIENT)
Dept: UROLOGY | Facility: CLINIC | Age: 62
End: 2017-06-02

## 2017-06-10 ENCOUNTER — RX RENEWAL (OUTPATIENT)
Age: 62
End: 2017-06-10

## 2017-06-19 ENCOUNTER — APPOINTMENT (OUTPATIENT)
Dept: INTERNAL MEDICINE | Facility: CLINIC | Age: 62
End: 2017-06-19

## 2017-06-19 ENCOUNTER — FORM ENCOUNTER (OUTPATIENT)
Age: 62
End: 2017-06-19

## 2017-06-19 VITALS
TEMPERATURE: 98.3 F | HEIGHT: 70 IN | DIASTOLIC BLOOD PRESSURE: 82 MMHG | HEART RATE: 88 BPM | WEIGHT: 248 LBS | BODY MASS INDEX: 35.5 KG/M2 | OXYGEN SATURATION: 97 % | SYSTOLIC BLOOD PRESSURE: 151 MMHG | RESPIRATION RATE: 17 BRPM

## 2017-06-19 RX ORDER — OXYCODONE AND ACETAMINOPHEN 5; 325 MG/1; MG/1
5-325 TABLET ORAL
Qty: 24 | Refills: 0 | Status: COMPLETED | COMMUNITY
Start: 2017-05-04

## 2017-06-20 ENCOUNTER — OUTPATIENT (OUTPATIENT)
Dept: OUTPATIENT SERVICES | Facility: HOSPITAL | Age: 62
LOS: 1 days | End: 2017-06-20
Payer: COMMERCIAL

## 2017-06-20 ENCOUNTER — APPOINTMENT (OUTPATIENT)
Dept: RADIOLOGY | Facility: CLINIC | Age: 62
End: 2017-06-20

## 2017-06-20 DIAGNOSIS — R05 COUGH: ICD-10-CM

## 2017-06-20 PROCEDURE — 71046 X-RAY EXAM CHEST 2 VIEWS: CPT

## 2017-06-27 ENCOUNTER — MESSAGE (OUTPATIENT)
Age: 62
End: 2017-06-27

## 2017-09-07 ENCOUNTER — RX RENEWAL (OUTPATIENT)
Age: 62
End: 2017-09-07

## 2017-09-18 ENCOUNTER — APPOINTMENT (OUTPATIENT)
Dept: INTERNAL MEDICINE | Facility: CLINIC | Age: 62
End: 2017-09-18
Payer: COMMERCIAL

## 2017-09-18 VITALS
DIASTOLIC BLOOD PRESSURE: 84 MMHG | OXYGEN SATURATION: 98 % | WEIGHT: 243 LBS | BODY MASS INDEX: 34.79 KG/M2 | HEART RATE: 77 BPM | RESPIRATION RATE: 17 BRPM | SYSTOLIC BLOOD PRESSURE: 140 MMHG | HEIGHT: 70 IN | TEMPERATURE: 98.2 F

## 2017-09-18 DIAGNOSIS — C64.9 MALIGNANT NEOPLASM OF UNSPECIFIED KIDNEY, EXCEPT RENAL PELVIS: ICD-10-CM

## 2017-09-18 PROCEDURE — 99396 PREV VISIT EST AGE 40-64: CPT

## 2017-09-18 RX ORDER — MULTIVITAMIN
TABLET ORAL
Refills: 0 | Status: ACTIVE | COMMUNITY

## 2017-09-18 RX ORDER — CHLORHEXIDINE GLUCONATE 4 %
1000 LIQUID (ML) TOPICAL
Refills: 0 | Status: ACTIVE | COMMUNITY

## 2017-09-18 RX ORDER — BENZONATATE 200 MG/1
200 CAPSULE ORAL
Qty: 15 | Refills: 0 | Status: DISCONTINUED | COMMUNITY
Start: 2017-06-19 | End: 2017-09-18

## 2017-09-24 LAB
ALBUMIN SERPL ELPH-MCNC: 5 G/DL
ALP BLD-CCNC: 72 U/L
ALT SERPL-CCNC: 26 U/L
ANION GAP SERPL CALC-SCNC: 16 MMOL/L
APPEARANCE: CLEAR
AST SERPL-CCNC: 17 U/L
BASOPHILS # BLD AUTO: 0.01 K/UL
BASOPHILS NFR BLD AUTO: 0.2 %
BILIRUB SERPL-MCNC: 1.5 MG/DL
BILIRUBIN URINE: NEGATIVE
BLOOD URINE: NEGATIVE
BUN SERPL-MCNC: 23 MG/DL
CALCIUM SERPL-MCNC: 9.9 MG/DL
CHLORIDE SERPL-SCNC: 101 MMOL/L
CHOLEST SERPL-MCNC: 103 MG/DL
CHOLEST/HDLC SERPL: 3.3 RATIO
CO2 SERPL-SCNC: 25 MMOL/L
COLOR: YELLOW
CREAT SERPL-MCNC: 1.26 MG/DL
CREAT SPEC-SCNC: 56 MG/DL
EOSINOPHIL # BLD AUTO: 0.34 K/UL
EOSINOPHIL NFR BLD AUTO: 5.8 %
GLUCOSE QUALITATIVE U: NORMAL MG/DL
GLUCOSE SERPL-MCNC: 97 MG/DL
HBA1C MFR BLD HPLC: 7.2 %
HCT VFR BLD CALC: 38.6 %
HCV AB SER QL: NONREACTIVE
HCV S/CO RATIO: 0.1 S/CO
HDLC SERPL-MCNC: 31 MG/DL
HGB BLD-MCNC: 12.7 G/DL
IMM GRANULOCYTES NFR BLD AUTO: 0.2 %
KETONES URINE: NEGATIVE
LDLC SERPL CALC-MCNC: 54 MG/DL
LEUKOCYTE ESTERASE URINE: NEGATIVE
LYMPHOCYTES # BLD AUTO: 1.73 K/UL
LYMPHOCYTES NFR BLD AUTO: 29.5 %
MAN DIFF?: NORMAL
MCHC RBC-ENTMCNC: 29.1 PG
MCHC RBC-ENTMCNC: 32.9 GM/DL
MCV RBC AUTO: 88.3 FL
MICROALBUMIN 24H UR DL<=1MG/L-MCNC: 6.1 MG/DL
MICROALBUMIN/CREAT 24H UR-RTO: 109 MG/G
MONOCYTES # BLD AUTO: 0.42 K/UL
MONOCYTES NFR BLD AUTO: 7.2 %
NEUTROPHILS # BLD AUTO: 3.36 K/UL
NEUTROPHILS NFR BLD AUTO: 57.1 %
NITRITE URINE: NEGATIVE
PH URINE: 5.5
PLATELET # BLD AUTO: 173 K/UL
POTASSIUM SERPL-SCNC: 4.8 MMOL/L
PROT SERPL-MCNC: 7.9 G/DL
PROTEIN URINE: NEGATIVE MG/DL
RBC # BLD: 4.37 M/UL
RBC # FLD: 14.4 %
SODIUM SERPL-SCNC: 142 MMOL/L
SPECIFIC GRAVITY URINE: 1.01
TRIGL SERPL-MCNC: 90 MG/DL
TSH SERPL-ACNC: 4.17 UIU/ML
UROBILINOGEN URINE: NORMAL MG/DL
WBC # FLD AUTO: 5.87 K/UL

## 2017-10-02 ENCOUNTER — RX RENEWAL (OUTPATIENT)
Age: 62
End: 2017-10-02

## 2017-10-23 ENCOUNTER — MEDICATION RENEWAL (OUTPATIENT)
Age: 62
End: 2017-10-23

## 2017-11-28 ENCOUNTER — OUTPATIENT (OUTPATIENT)
Dept: OUTPATIENT SERVICES | Facility: HOSPITAL | Age: 62
LOS: 1 days | End: 2017-11-28
Payer: COMMERCIAL

## 2017-11-28 ENCOUNTER — APPOINTMENT (OUTPATIENT)
Dept: UROLOGY | Facility: CLINIC | Age: 62
End: 2017-11-28
Payer: COMMERCIAL

## 2017-11-28 PROCEDURE — 99213 OFFICE O/P EST LOW 20 MIN: CPT | Mod: 25

## 2017-11-28 PROCEDURE — 76705 ECHO EXAM OF ABDOMEN: CPT | Mod: 26

## 2017-11-28 PROCEDURE — 76705 ECHO EXAM OF ABDOMEN: CPT

## 2017-12-05 DIAGNOSIS — C65.2 MALIGNANT NEOPLASM OF LEFT RENAL PELVIS: ICD-10-CM

## 2017-12-08 ENCOUNTER — APPOINTMENT (OUTPATIENT)
Dept: ENDOCRINOLOGY | Facility: CLINIC | Age: 62
End: 2017-12-08
Payer: COMMERCIAL

## 2017-12-08 VITALS
SYSTOLIC BLOOD PRESSURE: 122 MMHG | WEIGHT: 250 LBS | BODY MASS INDEX: 35.79 KG/M2 | OXYGEN SATURATION: 98 % | HEIGHT: 70 IN | DIASTOLIC BLOOD PRESSURE: 82 MMHG | HEART RATE: 83 BPM

## 2017-12-08 LAB
GLUCOSE BLDC GLUCOMTR-MCNC: 97
HBA1C MFR BLD HPLC: 6.4

## 2017-12-08 PROCEDURE — 83036 HEMOGLOBIN GLYCOSYLATED A1C: CPT | Mod: QW

## 2017-12-08 PROCEDURE — 99214 OFFICE O/P EST MOD 30 MIN: CPT

## 2017-12-08 PROCEDURE — 82962 GLUCOSE BLOOD TEST: CPT

## 2017-12-12 ENCOUNTER — APPOINTMENT (OUTPATIENT)
Dept: UROLOGY | Facility: CLINIC | Age: 62
End: 2017-12-12

## 2017-12-15 ENCOUNTER — MEDICATION RENEWAL (OUTPATIENT)
Age: 62
End: 2017-12-15

## 2018-02-27 ENCOUNTER — MEDICATION RENEWAL (OUTPATIENT)
Age: 63
End: 2018-02-27

## 2018-03-06 ENCOUNTER — RX RENEWAL (OUTPATIENT)
Age: 63
End: 2018-03-06

## 2018-04-27 ENCOUNTER — APPOINTMENT (OUTPATIENT)
Dept: ENDOCRINOLOGY | Facility: CLINIC | Age: 63
End: 2018-04-27
Payer: COMMERCIAL

## 2018-04-27 VITALS
SYSTOLIC BLOOD PRESSURE: 140 MMHG | OXYGEN SATURATION: 95 % | HEART RATE: 86 BPM | BODY MASS INDEX: 35.07 KG/M2 | DIASTOLIC BLOOD PRESSURE: 80 MMHG | HEIGHT: 70 IN | WEIGHT: 245 LBS

## 2018-04-27 LAB
GLUCOSE BLDC GLUCOMTR-MCNC: 118
HBA1C MFR BLD HPLC: 7.4

## 2018-04-27 PROCEDURE — 99214 OFFICE O/P EST MOD 30 MIN: CPT | Mod: 25

## 2018-04-27 PROCEDURE — 82962 GLUCOSE BLOOD TEST: CPT

## 2018-04-27 PROCEDURE — 95251 CONT GLUC MNTR ANALYSIS I&R: CPT

## 2018-04-27 PROCEDURE — 83036 HEMOGLOBIN GLYCOSYLATED A1C: CPT | Mod: QW

## 2018-04-27 RX ORDER — BUDESONIDE AND FORMOTEROL FUMARATE DIHYDRATE 160; 4.5 UG/1; UG/1
160-4.5 AEROSOL RESPIRATORY (INHALATION) TWICE DAILY
Qty: 1 | Refills: 0 | Status: DISCONTINUED | COMMUNITY
Start: 2017-06-19 | End: 2018-04-27

## 2018-04-30 LAB
ALBUMIN SERPL ELPH-MCNC: 4.7 G/DL
ALP BLD-CCNC: 73 U/L
ALT SERPL-CCNC: 36 U/L
ANION GAP SERPL CALC-SCNC: 12 MMOL/L
AST SERPL-CCNC: 30 U/L
BASOPHILS # BLD AUTO: 0.01 K/UL
BASOPHILS NFR BLD AUTO: 0.2 %
BILIRUB SERPL-MCNC: 1 MG/DL
BUN SERPL-MCNC: 22 MG/DL
CALCIUM SERPL-MCNC: 10.3 MG/DL
CHLORIDE SERPL-SCNC: 104 MMOL/L
CHOLEST SERPL-MCNC: 105 MG/DL
CHOLEST/HDLC SERPL: 3.5 RATIO
CO2 SERPL-SCNC: 28 MMOL/L
CREAT SERPL-MCNC: 1.34 MG/DL
EOSINOPHIL # BLD AUTO: 0.29 K/UL
EOSINOPHIL NFR BLD AUTO: 4.8 %
GLUCOSE SERPL-MCNC: 94 MG/DL
HCT VFR BLD CALC: 40.2 %
HDLC SERPL-MCNC: 30 MG/DL
HGB BLD-MCNC: 13.1 G/DL
IMM GRANULOCYTES NFR BLD AUTO: 0.2 %
LDLC SERPL CALC-MCNC: 55 MG/DL
LYMPHOCYTES # BLD AUTO: 1.46 K/UL
LYMPHOCYTES NFR BLD AUTO: 24.2 %
MAN DIFF?: NORMAL
MCHC RBC-ENTMCNC: 29.7 PG
MCHC RBC-ENTMCNC: 32.6 GM/DL
MCV RBC AUTO: 91.2 FL
MONOCYTES # BLD AUTO: 0.49 K/UL
MONOCYTES NFR BLD AUTO: 8.1 %
NEUTROPHILS # BLD AUTO: 3.78 K/UL
NEUTROPHILS NFR BLD AUTO: 62.5 %
PLATELET # BLD AUTO: 173 K/UL
POTASSIUM SERPL-SCNC: 4.6 MMOL/L
PROT SERPL-MCNC: 7.4 G/DL
RBC # BLD: 4.41 M/UL
RBC # FLD: 14.1 %
SODIUM SERPL-SCNC: 144 MMOL/L
TRIGL SERPL-MCNC: 98 MG/DL
TSH SERPL-ACNC: 4.02 UIU/ML
WBC # FLD AUTO: 6.04 K/UL

## 2018-05-15 ENCOUNTER — CLINICAL ADVICE (OUTPATIENT)
Age: 63
End: 2018-05-15

## 2018-05-21 ENCOUNTER — RX RENEWAL (OUTPATIENT)
Age: 63
End: 2018-05-21

## 2018-05-22 ENCOUNTER — MEDICATION RENEWAL (OUTPATIENT)
Age: 63
End: 2018-05-22

## 2018-06-29 ENCOUNTER — APPOINTMENT (OUTPATIENT)
Dept: ENDOCRINOLOGY | Facility: CLINIC | Age: 63
End: 2018-06-29
Payer: COMMERCIAL

## 2018-06-29 VITALS
SYSTOLIC BLOOD PRESSURE: 130 MMHG | BODY MASS INDEX: 35.07 KG/M2 | HEIGHT: 70 IN | OXYGEN SATURATION: 96 % | WEIGHT: 245 LBS | DIASTOLIC BLOOD PRESSURE: 82 MMHG | HEART RATE: 90 BPM

## 2018-06-29 PROCEDURE — 99214 OFFICE O/P EST MOD 30 MIN: CPT

## 2018-06-29 RX ORDER — EMPAGLIFLOZIN 10 MG/1
10 TABLET, FILM COATED ORAL
Qty: 90 | Refills: 3 | Status: DISCONTINUED | COMMUNITY
Start: 2018-05-22 | End: 2018-06-29

## 2018-08-13 ENCOUNTER — RX RENEWAL (OUTPATIENT)
Age: 63
End: 2018-08-13

## 2018-08-27 PROBLEM — I10 ESSENTIAL (PRIMARY) HYPERTENSION: Chronic | Status: ACTIVE | Noted: 2017-04-24

## 2018-08-27 PROBLEM — E78.5 HYPERLIPIDEMIA, UNSPECIFIED: Chronic | Status: ACTIVE | Noted: 2017-04-24

## 2018-08-27 PROBLEM — E11.9 TYPE 2 DIABETES MELLITUS WITHOUT COMPLICATIONS: Chronic | Status: ACTIVE | Noted: 2017-04-24

## 2018-08-27 PROBLEM — E66.9 OBESITY, UNSPECIFIED: Chronic | Status: ACTIVE | Noted: 2017-04-24

## 2018-08-28 ENCOUNTER — MEDICATION RENEWAL (OUTPATIENT)
Age: 63
End: 2018-08-28

## 2018-08-29 ENCOUNTER — APPOINTMENT (OUTPATIENT)
Dept: INTERNAL MEDICINE | Facility: CLINIC | Age: 63
End: 2018-08-29
Payer: COMMERCIAL

## 2018-08-29 ENCOUNTER — NON-APPOINTMENT (OUTPATIENT)
Age: 63
End: 2018-08-29

## 2018-08-29 VITALS
BODY MASS INDEX: 35.07 KG/M2 | OXYGEN SATURATION: 99 % | SYSTOLIC BLOOD PRESSURE: 130 MMHG | HEIGHT: 70 IN | TEMPERATURE: 97.9 F | WEIGHT: 245 LBS | HEART RATE: 88 BPM | DIASTOLIC BLOOD PRESSURE: 80 MMHG | RESPIRATION RATE: 17 BRPM

## 2018-08-29 DIAGNOSIS — Z01.818 ENCOUNTER FOR OTHER PREPROCEDURAL EXAMINATION: ICD-10-CM

## 2018-08-29 DIAGNOSIS — R05 COUGH: ICD-10-CM

## 2018-08-29 DIAGNOSIS — R42 DIZZINESS AND GIDDINESS: ICD-10-CM

## 2018-08-29 DIAGNOSIS — N52.9 MALE ERECTILE DYSFUNCTION, UNSPECIFIED: ICD-10-CM

## 2018-08-29 DIAGNOSIS — M54.5 LOW BACK PAIN: ICD-10-CM

## 2018-08-29 DIAGNOSIS — S83.249A OTHER TEAR OF MEDIAL MENISCUS, CURRENT INJURY, UNSPECIFIED KNEE, INITIAL ENCOUNTER: ICD-10-CM

## 2018-08-29 DIAGNOSIS — M87.059 IDIOPATHIC ASEPTIC NECROSIS OF UNSPECIFIED FEMUR: ICD-10-CM

## 2018-08-29 DIAGNOSIS — M25.569 PAIN IN UNSPECIFIED KNEE: ICD-10-CM

## 2018-08-29 DIAGNOSIS — M51.26 OTHER INTERVERTEBRAL DISC DISPLACEMENT, LUMBAR REGION: ICD-10-CM

## 2018-08-29 PROCEDURE — 99214 OFFICE O/P EST MOD 30 MIN: CPT

## 2018-08-29 PROCEDURE — 93000 ELECTROCARDIOGRAM COMPLETE: CPT

## 2018-08-30 LAB
ALBUMIN SERPL ELPH-MCNC: 4.9 G/DL
ALP BLD-CCNC: 74 U/L
ALT SERPL-CCNC: 31 U/L
ANION GAP SERPL CALC-SCNC: 14 MMOL/L
AST SERPL-CCNC: 22 U/L
BASOPHILS # BLD AUTO: 0.01 K/UL
BASOPHILS NFR BLD AUTO: 0.2 %
BILIRUB SERPL-MCNC: 1 MG/DL
BUN SERPL-MCNC: 27 MG/DL
CALCIUM SERPL-MCNC: 10.5 MG/DL
CHLORIDE SERPL-SCNC: 102 MMOL/L
CO2 SERPL-SCNC: 25 MMOL/L
CREAT SERPL-MCNC: 1.21 MG/DL
EOSINOPHIL # BLD AUTO: 0.33 K/UL
EOSINOPHIL NFR BLD AUTO: 5.2 %
GLUCOSE SERPL-MCNC: 144 MG/DL
HCT VFR BLD CALC: 41.6 %
HGB BLD-MCNC: 13.4 G/DL
IMM GRANULOCYTES NFR BLD AUTO: 0.3 %
LYMPHOCYTES # BLD AUTO: 1.58 K/UL
LYMPHOCYTES NFR BLD AUTO: 24.9 %
MAN DIFF?: NORMAL
MCHC RBC-ENTMCNC: 29.5 PG
MCHC RBC-ENTMCNC: 32.2 GM/DL
MCV RBC AUTO: 91.4 FL
MONOCYTES # BLD AUTO: 0.37 K/UL
MONOCYTES NFR BLD AUTO: 5.8 %
NEUTROPHILS # BLD AUTO: 4.04 K/UL
NEUTROPHILS NFR BLD AUTO: 63.6 %
PLATELET # BLD AUTO: 192 K/UL
POTASSIUM SERPL-SCNC: 5 MMOL/L
PROT SERPL-MCNC: 7.7 G/DL
RBC # BLD: 4.55 M/UL
RBC # FLD: 14 %
SODIUM SERPL-SCNC: 141 MMOL/L
WBC # FLD AUTO: 6.35 K/UL

## 2018-09-04 ENCOUNTER — RX RENEWAL (OUTPATIENT)
Age: 63
End: 2018-09-04

## 2018-09-15 NOTE — H&P PST ADULT - CONSTITUTIONAL DETAILS
Hospitalist Progress Note      Patient: Asuncion Ulrich Date: 9/15/2018   YOB: 1926 Admission Date: 9/8/2018   MRN: [id number] Attending: Mart Hodges MD     Hospital Day: Hospital Day: 8    Date of Service: 9/15/2018    SUBJECTIVE  Asuncion Ulrich is a 89 year old female who was admitted on 9/8/2018   No acute overnight medical or behavioral events.      OBJECTIVE  Scheduled Meds     vancomycin 250 mg 4 times per day   acetaminophen 650 mg TID   traZODone 12.5 mg Nightly   divalproex 125 mg 2 times per day   sodium chloride (PF) 2 mL 2 times per day   cholecalciferol 2,000 Units Daily     Continuous Infusions     PHYSICAL EXAM  Vital signs:    Visit Vitals  /62 ([place] [facility] [place] Supine)   Pulse 57   Temp 97.5 °F (36.4 °C) (Oral)   Resp 16   Ht 5' (1.524 m)   Wt 49.8 kg   SpO2 99%   BMI 21.44 kg/m²       I/Os:    No intake or output data in the 24 hours ending 09/15/18 1044  Constitutional: Patient appears as stated age. No apparent distress.   HENT: Normocephalic, atraumatic.   Eyes: . Conjunctivae normal. No discharge.   Neck: Normal range of motion. No tenderness. Supple. No stridor.   Cardiovascular: Normal heart rate. Normal rhythm.  Lungs: Normal breath sounds. No respiratory distress. No wheezing. No rales are heard  Abdomen:: Soft, nontender, without masses or hernias. No hepatosplenomegaly. Bowel sounds are hypo active. There is no rebound or guarding or evidence of peritonitis;  Skin: Warm. Dry. No erythema. No rash.   Extremities: Intact distal pulses. No edema. No tenderness. No cyanosis. No clubbing.   Neurologic: Awake, following commands       Labs    Recent Labs  Lab 09/14/18  1228 09/13/18  0946   WBC 6.1 8.3   HCT 44.1 43.1   HGB 14.6 14.8    375       Recent Labs  Lab 09/14/18  1228 09/13/18  0946  09/08/18  1620   SODIUM 143 140  < > 140   POTASSIUM 4.3 4.2  < > 3.9   CHLORIDE 108* 106  < > 103   CO2 25 22  < > 30   GLUCOSE 89 101*  < > 72   BUN 12 12  < >  13   CREATININE 0.72 0.72  < > 0.63   ALBUMIN  --   --   --  2.9*   AST  --   --   --  22   ALKPT  --   --   --  75   GPT  --   --   --  26   BILIRUBIN  --   --   --  0.4   MG  --  1.9  --   --    < > = values in this interval not displayed.  Test Results  Troponin: No results found      ASSESSMENT/PLAN  Asuncion Ulrich is a 89 year old female who was admitted on 9/8/2018 for BRBPR.     Bright Red Blood Per Rectum: Noted in NH and was sent here for eval ( resolved) Painless. Hgb currently WNL. Broad differential, including hemorrhoids & diverticular bleed.   -No more episodes or rectal bleeding.  -Hold home ASA     Lactic Acidosis: Resolved        H/o Right Hip Fracture  -PT/OT to minimize deconditioning     Essential HTN: currently normotensive       Dementia: w/ behavioral disturbance. POA activated.  -Seroquel was dc   - Trazodone at bedtime    - Continue on Acetaminophen and Depakote   -Frequent reorientation. Minimize use of pharmacologic restraints if possible    Cdiff: Continue on PO Vancomycin , patient does not look septic. No fever, chills , N/V or abdominal pain. Tolerating diet.    Hypokalemia: K as needed     DVT Prophylaxis: SCD/KHADIJAH    Code Status: [facility]  DVT Prophylaxis: hold     Disposition:     Discharge plan is currently return to [facility] on Monday           no distress/obese

## 2018-09-21 ENCOUNTER — APPOINTMENT (OUTPATIENT)
Dept: INTERNAL MEDICINE | Facility: CLINIC | Age: 63
End: 2018-09-21

## 2018-10-12 ENCOUNTER — APPOINTMENT (OUTPATIENT)
Dept: ENDOCRINOLOGY | Facility: CLINIC | Age: 63
End: 2018-10-12
Payer: COMMERCIAL

## 2018-10-12 VITALS
OXYGEN SATURATION: 98 % | SYSTOLIC BLOOD PRESSURE: 138 MMHG | BODY MASS INDEX: 34.93 KG/M2 | HEIGHT: 70 IN | HEART RATE: 82 BPM | WEIGHT: 244 LBS | DIASTOLIC BLOOD PRESSURE: 80 MMHG

## 2018-10-12 LAB
GLUCOSE BLDC GLUCOMTR-MCNC: 166
HBA1C MFR BLD HPLC: 7.3

## 2018-10-12 PROCEDURE — 99214 OFFICE O/P EST MOD 30 MIN: CPT

## 2018-10-18 LAB
CREAT SPEC-SCNC: 52 MG/DL
MICROALBUMIN 24H UR DL<=1MG/L-MCNC: 9.5 MG/DL
MICROALBUMIN/CREAT 24H UR-RTO: 184 MG/G

## 2018-12-14 ENCOUNTER — APPOINTMENT (OUTPATIENT)
Dept: UROLOGY | Facility: CLINIC | Age: 63
End: 2018-12-14
Payer: COMMERCIAL

## 2018-12-14 ENCOUNTER — OUTPATIENT (OUTPATIENT)
Dept: OUTPATIENT SERVICES | Facility: HOSPITAL | Age: 63
LOS: 1 days | End: 2018-12-14
Payer: COMMERCIAL

## 2018-12-14 DIAGNOSIS — R35.0 FREQUENCY OF MICTURITION: ICD-10-CM

## 2018-12-14 PROCEDURE — 76775 US EXAM ABDO BACK WALL LIM: CPT | Mod: 26

## 2018-12-14 PROCEDURE — 99214 OFFICE O/P EST MOD 30 MIN: CPT | Mod: 25

## 2018-12-14 PROCEDURE — 76775 US EXAM ABDO BACK WALL LIM: CPT

## 2018-12-18 DIAGNOSIS — C64.9 MALIGNANT NEOPLASM OF UNSPECIFIED KIDNEY, EXCEPT RENAL PELVIS: ICD-10-CM

## 2019-01-08 ENCOUNTER — MEDICATION RENEWAL (OUTPATIENT)
Age: 64
End: 2019-01-08

## 2019-02-20 ENCOUNTER — APPOINTMENT (OUTPATIENT)
Dept: ENDOCRINOLOGY | Facility: CLINIC | Age: 64
End: 2019-02-20
Payer: COMMERCIAL

## 2019-02-20 VITALS
HEIGHT: 70 IN | WEIGHT: 241 LBS | HEART RATE: 100 BPM | OXYGEN SATURATION: 98 % | BODY MASS INDEX: 34.5 KG/M2 | SYSTOLIC BLOOD PRESSURE: 128 MMHG | DIASTOLIC BLOOD PRESSURE: 80 MMHG

## 2019-02-20 PROCEDURE — 99214 OFFICE O/P EST MOD 30 MIN: CPT

## 2019-02-20 RX ORDER — BLOOD SUGAR DIAGNOSTIC
STRIP MISCELLANEOUS 4 TIMES DAILY
Qty: 4 | Refills: 2 | Status: DISCONTINUED | COMMUNITY
Start: 2017-04-26 | End: 2019-02-20

## 2019-02-20 NOTE — ASSESSMENT
[FreeTextEntry1] : 63 -year-old male with poorly controlled type 2 diabetes and overweight.\par \par 1.Type 2 diabetes: His hemoglobin A1c is 8.4% today which was the worse he had been. \par Discussed with the patient the importance of continued exercise and the need for decreased caloric intake for weight loss. He thinks that his glucose has been better control on glipizide 10mg bid with a lower does of Lantus because post-prandial rises.  I agree that he can go back to the glipizide 10mg bid option as long as he doesn't have hypoglycemia. Will also decrease Lantus to 20 units at bedtime.  Continue with Metformin 1000mg BID.  \par I went over treatment of hypoglycemia\par He is to call me if there's any high or low glucose <70 or above 250mg/dl.  \par Microalbumin 184  mg/g check last visit Oct 2018. \par He just missed his eye doctor appointment, will reschedule, he is seeing some floaters.\par He hasn't been up to date with podiatry. Foot exam 02/20/2019 by me\par \par 2.Hyperlipidemia: ldl 55 hdl 30 in april 2018 on Lipitor 40 mg daily. \par \par 3. Hypertension: cont lisinopril 30 mg, amlodipine 5mg daily normal BP. \par \par \par Diabetes Health Care Maintenance\par - Opthalmology up to date: no\par -Podiatry up to date: referral given 02/20/2019 \par -Antiplatelet agent: yes\par  -Urine microalbumin: +, Oct 2018\par -ACE-I/ARB: yes\par -Patient to call for persistent glucose < 70 or > 300\par -Discussed hypoglycemic protocol.  \par -Yearly flu vaccine recommended \par -Vitamin B12 level if on metformin \par -Lipid panel check next visit. \par -Statin therapy: yes\par \par EDUCATION: Reviewed 'ABC' of diabetes management (respective goals in parentheses): A1C (<7), blood pressure (<140/90), and cholesterol (LDL <100).\par \par COMPLIANCE at present is estimated to be: good  \par FOLLOW UP: I recommend that frequency of visits for diabetes care be every 3 month \par  \par \par Followup in 3-4 months [Carbohydrate Consistent Diet] : carbohydrate consistent diet [Hypoglycemia Management] : hypoglycemia management [Diabetes Foot Care] : diabetes foot care [Long Term Vascular Complications] : long term vascular complications of diabetes [Action and use of Insulin] : action and use of short and long-acting insulin [Self Monitoring of Blood Glucose] : self monitoring of blood glucose [Insulin Self-Administration] : insulin self-administration [Injection Technique, Storage, Sharps Disposal] : injection technique, storage, and sharps disposal [Retinopathy Screening] : Patient was referred to ophthalmology for retinopathy screening

## 2019-02-20 NOTE — PHYSICAL EXAM
[Alert] : alert [No Acute Distress] : no acute distress [EOMI] : extra ocular movement intact [Thyroid Not Enlarged] : the thyroid was not enlarged [Clear to Auscultation] : lungs were clear to auscultation bilaterally [Normal S1, S2] : normal S1 and S2 [Regular Rhythm] : with a regular rhythm [Pedal Pulses Normal] : the pedal pulses are present [No Edema] : there was no peripheral edema [Normal Bowel Sounds] : normal bowel sounds [Not Tender] : non-tender [No Spinal Tenderness] : no spinal tenderness [Normal Gait] : normal gait [Right Foot Was Examined] : right foot ~C was examined [Left Foot Was Examined] : left foot ~C was examined [No Tremors] : no tremors [Normal Affect] : the affect was normal [Foot Ulcers] : no foot ulcers [FreeTextEntry1] : thickened toe nails [FreeTextEntry2] : deviated to right [FreeTextEntry5] : thickened toe nails [FreeTextEntry6] : Deviated to left

## 2019-02-20 NOTE — HISTORY OF PRESENT ILLNESS
[FreeTextEntry1] : 63-year-old male for followup of type 2 diabetes. \par He was diagnosed in the 1990's. \par He does not have known heart disease stress test many years ago\par He saw eye doc in 2018 has mild retinopathy.  He is due for an appointment, he just missed his recent appointment and needs to reschedule.  \par He has neuropathy in his feet.  There is no neuropathy.  \par He developed pancreatitis with GLp1 analog had elevated amylase and lipase \par He was found to have renal cancer and had surgery it was removed in April 2017.  He didn't need any chemotherapy.  He is following up with Dr. Reed in Dec 2017. \par He is taking Lantus 35 units daily, he takes metformin 1000 mg (EGFR Aug 2018 was 63) twice a day and glipizide 10 mg once a day\par He was supposed to change to jardiance based on sensor info but does not want to change meds. \par He lost his glucometer since last visit. (uses freestyle lite)\par He reports that he has been bad with his diet for the last 3 months, attended many birthday parties as well as weddings!\par He didn't change his regimen to Lantus 20 units at bedtime and increasing Glipizide to 10mg twice daily yet.  \par \par Regarding HTN, he currently takes amlodipine 5mg daily \par Regarding HLD, he currently takes atorvastatin 40mg daily

## 2019-02-21 ENCOUNTER — RX RENEWAL (OUTPATIENT)
Age: 64
End: 2019-02-21

## 2019-02-22 LAB
GLUCOSE BLDC GLUCOMTR-MCNC: 136
HBA1C MFR BLD HPLC: 8.4

## 2019-03-19 ENCOUNTER — APPOINTMENT (OUTPATIENT)
Dept: INTERNAL MEDICINE | Facility: CLINIC | Age: 64
End: 2019-03-19
Payer: COMMERCIAL

## 2019-03-19 ENCOUNTER — LABORATORY RESULT (OUTPATIENT)
Age: 64
End: 2019-03-19

## 2019-03-19 VITALS
TEMPERATURE: 97.8 F | OXYGEN SATURATION: 98 % | BODY MASS INDEX: 34.5 KG/M2 | HEART RATE: 79 BPM | SYSTOLIC BLOOD PRESSURE: 162 MMHG | WEIGHT: 241 LBS | RESPIRATION RATE: 14 BRPM | DIASTOLIC BLOOD PRESSURE: 96 MMHG | HEIGHT: 70 IN

## 2019-03-19 DIAGNOSIS — R42 DIZZINESS AND GIDDINESS: ICD-10-CM

## 2019-03-19 PROCEDURE — 99396 PREV VISIT EST AGE 40-64: CPT

## 2019-03-21 LAB
25(OH)D3 SERPL-MCNC: 35.3 NG/ML
ALBUMIN SERPL ELPH-MCNC: 5 G/DL
ALP BLD-CCNC: 79 U/L
ALT SERPL-CCNC: 24 U/L
ANION GAP SERPL CALC-SCNC: 13 MMOL/L
APPEARANCE: CLEAR
AST SERPL-CCNC: 18 U/L
BASOPHILS # BLD AUTO: 0.03 K/UL
BASOPHILS NFR BLD AUTO: 0.4 %
BILIRUB SERPL-MCNC: 1.1 MG/DL
BILIRUBIN URINE: NEGATIVE
BLOOD URINE: NEGATIVE
BUN SERPL-MCNC: 20 MG/DL
CALCIUM SERPL-MCNC: 10.8 MG/DL
CHLORIDE SERPL-SCNC: 101 MMOL/L
CHOLEST SERPL-MCNC: 112 MG/DL
CHOLEST/HDLC SERPL: 3.5 RATIO
CO2 SERPL-SCNC: 26 MMOL/L
COLOR: YELLOW
CREAT SERPL-MCNC: 1.07 MG/DL
EOSINOPHIL # BLD AUTO: 0.27 K/UL
EOSINOPHIL NFR BLD AUTO: 3.8 %
GLUCOSE QUALITATIVE U: NORMAL
GLUCOSE SERPL-MCNC: 128 MG/DL
HCT VFR BLD CALC: 44.1 %
HDLC SERPL-MCNC: 32 MG/DL
HGB BLD-MCNC: 14.7 G/DL
IMM GRANULOCYTES NFR BLD AUTO: 0.3 %
KETONES URINE: NEGATIVE
LDLC SERPL CALC-MCNC: 57 MG/DL
LEUKOCYTE ESTERASE URINE: NEGATIVE
LYMPHOCYTES # BLD AUTO: 1.79 K/UL
LYMPHOCYTES NFR BLD AUTO: 25.4 %
MAN DIFF?: NORMAL
MCHC RBC-ENTMCNC: 30.1 PG
MCHC RBC-ENTMCNC: 33.3 GM/DL
MCV RBC AUTO: 90.4 FL
MONOCYTES # BLD AUTO: 0.53 K/UL
MONOCYTES NFR BLD AUTO: 7.5 %
NEUTROPHILS # BLD AUTO: 4.4 K/UL
NEUTROPHILS NFR BLD AUTO: 62.6 %
NITRITE URINE: NEGATIVE
PH URINE: 5.5
PLATELET # BLD AUTO: 195 K/UL
POTASSIUM SERPL-SCNC: 4.9 MMOL/L
PROT SERPL-MCNC: 7.7 G/DL
PROTEIN URINE: ABNORMAL
PSA SERPL-MCNC: 1.38 NG/ML
RBC # BLD: 4.88 M/UL
RBC # FLD: 13 %
SODIUM SERPL-SCNC: 140 MMOL/L
SPECIFIC GRAVITY URINE: 1.02
T4 FREE SERPL-MCNC: 1.1 NG/DL
TRIGL SERPL-MCNC: 115 MG/DL
TSH SERPL-ACNC: 2.97 UIU/ML
UROBILINOGEN URINE: NORMAL
VIT B12 SERPL-MCNC: 1575 PG/ML
WBC # FLD AUTO: 7.04 K/UL

## 2019-03-22 PROBLEM — R42 DIZZINESS: Status: ACTIVE | Noted: 2018-08-29

## 2019-03-22 NOTE — ASSESSMENT
[FreeTextEntry1] : \par \par annual Physcial\par fasting blood work  sent \par specialty care followup \par diabetes care\par renal cell ca/ partial nephrectomy\par check PSA\par pneumo vaccine,  shingrix offered to patient\par he will think about it \par refuses flu vaccine\par BP elevated,  repeat readings improved\par he "is aggravated about office today"\par

## 2019-03-22 NOTE — HISTORY OF PRESENT ILLNESS
[FreeTextEntry1] : I am here for my check up\par  [de-identified] : Presents for annual physical.\par offers no new complaints\par under the care of specialists\par has DM  on insulin/oral meds\par BMI 34\par \par interval history:\par sees several subspecialist\par endocrine DR Stout\par urologist DR Reed\par ophthalmologist [facility]\par no recent cardiologist DR Becerra\par \par vaccinations reviewed does not wish to have flu shot\par no pvax. prevnar Zostavax\par BMI > 34 \par

## 2019-03-22 NOTE — HEALTH RISK ASSESSMENT
[Good] : ~his/her~  mood as  good [0] : 2) Feeling down, depressed, or hopeless: Not at all (0) [HIV Test offered] : HIV Test offered [Fully functional (using the telephone, shopping, preparing meals, housekeeping, doing laundry, using] : Fully functional and needs no help or supervision to perform IADLs (using the telephone, shopping, preparing meals, housekeeping, doing laundry, using transportation, managing medications and managing finances) [ColonoscopyDate] : 09/16

## 2019-03-22 NOTE — PHYSICAL EXAM
[No Acute Distress] : no acute distress [Well Nourished] : well nourished [Well Developed] : well developed [Well-Appearing] : well-appearing [Normal Sclera/Conjunctiva] : normal sclera/conjunctiva [PERRL] : pupils equal round and reactive to light [EOMI] : extraocular movements intact [Normal Outer Ear/Nose] : the outer ears and nose were normal in appearance [Normal Oropharynx] : the oropharynx was normal [Supple] : supple [No JVD] : no jugular venous distention [No Lymphadenopathy] : no lymphadenopathy [No Respiratory Distress] : no respiratory distress  [Clear to Auscultation] : lungs were clear to auscultation bilaterally [No Accessory Muscle Use] : no accessory muscle use [Normal Rate] : normal rate  [Regular Rhythm] : with a regular rhythm [Normal S1, S2] : normal S1 and S2 [No Murmur] : no murmur heard [Soft] : abdomen soft [Non Tender] : non-tender [Non-distended] : non-distended [No Masses] : no abdominal mass palpated [No HSM] : no HSM [Normal Bowel Sounds] : normal bowel sounds [Normal Posterior Cervical Nodes] : no posterior cervical lymphadenopathy [Normal Anterior Cervical Nodes] : no anterior cervical lymphadenopathy [No CVA Tenderness] : no CVA  tenderness [No Joint Swelling] : no joint swelling [No Spinal Tenderness] : no spinal tenderness [Grossly Normal Strength/Tone] : grossly normal strength/tone [No Rash] : no rash [Normal Gait] : normal gait [No Focal Deficits] : no focal deficits [Coordination Grossly Intact] : coordination grossly intact [Deep Tendon Reflexes (DTR)] : deep tendon reflexes were 2+ and symmetric [Normal Affect] : the affect was normal [Normal Insight/Judgement] : insight and judgment were intact [No Edema] : there was no peripheral edema [de-identified] : sees urologist

## 2019-04-08 ENCOUNTER — RX RENEWAL (OUTPATIENT)
Age: 64
End: 2019-04-08

## 2019-04-14 ENCOUNTER — MESSAGE (OUTPATIENT)
Age: 64
End: 2019-04-14

## 2019-04-14 DIAGNOSIS — R82.90 UNSPECIFIED ABNORMAL FINDINGS IN URINE: ICD-10-CM

## 2019-04-19 ENCOUNTER — RX RENEWAL (OUTPATIENT)
Age: 64
End: 2019-04-19

## 2019-05-02 ENCOUNTER — RX RENEWAL (OUTPATIENT)
Age: 64
End: 2019-05-02

## 2019-05-08 ENCOUNTER — RX RENEWAL (OUTPATIENT)
Age: 64
End: 2019-05-08

## 2019-05-08 ENCOUNTER — LABORATORY RESULT (OUTPATIENT)
Age: 64
End: 2019-05-08

## 2019-05-09 ENCOUNTER — MESSAGE (OUTPATIENT)
Age: 64
End: 2019-05-09

## 2019-05-09 DIAGNOSIS — R80.9 PROTEINURIA, UNSPECIFIED: ICD-10-CM

## 2019-05-09 LAB
APPEARANCE: CLEAR
BILIRUBIN URINE: NEGATIVE
BLOOD URINE: NEGATIVE
COLOR: YELLOW
GLUCOSE QUALITATIVE U: ABNORMAL
KETONES URINE: NEGATIVE
LEUKOCYTE ESTERASE URINE: NEGATIVE
NITRITE URINE: NEGATIVE
PH URINE: 6
PROTEIN URINE: ABNORMAL
SPECIFIC GRAVITY URINE: 1.02
UROBILINOGEN URINE: NORMAL

## 2019-05-14 ENCOUNTER — RX RENEWAL (OUTPATIENT)
Age: 64
End: 2019-05-14

## 2019-05-28 ENCOUNTER — APPOINTMENT (OUTPATIENT)
Dept: ENDOCRINOLOGY | Facility: CLINIC | Age: 64
End: 2019-05-28
Payer: COMMERCIAL

## 2019-05-28 VITALS
DIASTOLIC BLOOD PRESSURE: 80 MMHG | OXYGEN SATURATION: 97 % | WEIGHT: 240 LBS | HEIGHT: 70 IN | HEART RATE: 84 BPM | BODY MASS INDEX: 34.36 KG/M2 | SYSTOLIC BLOOD PRESSURE: 120 MMHG

## 2019-05-28 PROCEDURE — 99214 OFFICE O/P EST MOD 30 MIN: CPT

## 2019-05-28 RX ORDER — AMLODIPINE BESYLATE 5 MG/1
5 TABLET ORAL
Qty: 60 | Refills: 0 | Status: DISCONTINUED | COMMUNITY
Start: 2017-04-26 | End: 2019-05-28

## 2019-05-28 NOTE — PHYSICAL EXAM
[EOMI] : extra ocular movement intact [Alert] : alert [No Acute Distress] : no acute distress [Normal S1, S2] : normal S1 and S2 [Thyroid Not Enlarged] : the thyroid was not enlarged [Clear to Auscultation] : lungs were clear to auscultation bilaterally [No Edema] : there was no peripheral edema [Pedal Pulses Normal] : the pedal pulses are present [Regular Rhythm] : with a regular rhythm [Not Tender] : non-tender [No Spinal Tenderness] : no spinal tenderness [Normal Bowel Sounds] : normal bowel sounds [Right Foot Was Examined] : right foot ~C was examined [Normal Gait] : normal gait [No Tremors] : no tremors [Left Foot Was Examined] : left foot ~C was examined [Normal Affect] : the affect was normal [Foot Ulcers] : no foot ulcers [FreeTextEntry1] : thickened toe nails [FreeTextEntry2] : deviated to right [FreeTextEntry5] : thickened toe nails [FreeTextEntry6] : Deviated to left

## 2019-05-28 NOTE — ASSESSMENT
[Carbohydrate Consistent Diet] : carbohydrate consistent diet [Hypoglycemia Management] : hypoglycemia management [Glucagon Use] : glucagon use [Diabetes Foot Care] : diabetes foot care [Long Term Vascular Complications] : long term vascular complications of diabetes [Action and use of Insulin] : action and use of short and long-acting insulin [Self Monitoring of Blood Glucose] : self monitoring of blood glucose [FreeTextEntry1] : 63 -year-old male with poorly controlled type 2 diabetes and overweight.\par \par #1.Type 2 diabetes: His hemoglobin A1c is 8.4% today which was the worse he had been. \par Discussed with the patient the importance of continued exercise and the need for decreased caloric intake for weight loss.\par Will increase lantus to 22 units at bedtime\par Will continue with metformin 1000mg bid \par Will continue with glipizide 10mg twice daily \par I went over treatment of hypoglycemia\par He is to call me if there's any high or low glucose <70 or above 250mg/dl.  \par Microalbumin 184  mg/g check last visit Oct 2018. \par He hasn't been up to date with podiatry. Foot exam 02/20/2019 by me\par Up to date with opthalmology. \par \par #2.Hyperlipidemia: ldl 55 hdl 30 in april 2018 on Lipitor 40 mg daily. \par \par #3. Hypertension: cont lisinopril 30 mg, amlodipine 5mg daily normal BP. \par \par \par Diabetes Health Care Maintenance\par - Opthalmology up to date: Yes\par -Podiatry up to date: Yes\par -Antiplatelet agent: yes\par  -Urine microalbumin: +, Oct 2018, will repeat in 3 months. on ACEi\par -ACE-I/ARB: yes\par -Patient to call for persistent glucose < 70 or > 300\par -Discussed hypoglycemic protocol.  \par -Yearly flu vaccine recommended \par -Vitamin B12 normal, elevated, will decrease to every other day.  \par -Lipid panel check next visit. \par -Statin therapy: yes\par \par EDUCATION: Reviewed 'ABC' of diabetes management (respective goals in parentheses): A1C (<7), blood pressure (<140/90), and cholesterol (LDL <100).\par \par COMPLIANCE at present is estimated to be: good  \par FOLLOW UP: I recommend that frequency of visits for diabetes care be every 3 month \par  \par \par Followup in 3-4 months

## 2019-05-28 NOTE — HISTORY OF PRESENT ILLNESS
[___] : [unfilled] [FreeTextEntry1] : 63-year-old male for followup of type 2 diabetes. He was diagnosed in the 1990's. \par He does not have known heart disease stress test many years ago\par He has neuropathy in his feet.  There is no neuropathy.  They ache at night time, he is going to podiatrist soon.  \par He developed pancreatitis with GLP1 analog had elevated amylase and lipase.  \par \par He was found to have renal cancer and had surgery it was removed in April 2017.  He didn't need any chemotherapy.  He is following up with Dr. Reed in Dec 2017. \par \par He is taking Lantus 20 units daily, he takes metformin 1000 mg (EGFR Aug 2018 was 63) twice a day and glipizide 10 mg once a day\par He was supposed to change to Jardiance based on sensor info but does not want to change meds. \par \par Regarding HTN, he currently is on lisinopril 30mg daily .  \par \par Regarding HLD, he currently takes atorvastatin 40mg daily

## 2019-05-29 LAB
ALBUMIN SERPL ELPH-MCNC: 4.9 G/DL
ALP BLD-CCNC: 86 U/L
ALT SERPL-CCNC: 24 U/L
ANION GAP SERPL CALC-SCNC: 13 MMOL/L
AST SERPL-CCNC: 18 U/L
BILIRUB SERPL-MCNC: 1.3 MG/DL
BUN SERPL-MCNC: 22 MG/DL
CALCIUM SERPL-MCNC: 10.2 MG/DL
CHLORIDE SERPL-SCNC: 100 MMOL/L
CO2 SERPL-SCNC: 26 MMOL/L
CREAT SERPL-MCNC: 1.11 MG/DL
GLUCOSE BLDC GLUCOMTR-MCNC: 264
GLUCOSE SERPL-MCNC: 249 MG/DL
POTASSIUM SERPL-SCNC: 4.9 MMOL/L
PROT SERPL-MCNC: 7.5 G/DL
SODIUM SERPL-SCNC: 139 MMOL/L

## 2019-08-05 ENCOUNTER — APPOINTMENT (OUTPATIENT)
Dept: INTERNAL MEDICINE | Facility: CLINIC | Age: 64
End: 2019-08-05

## 2019-08-14 ENCOUNTER — RX RENEWAL (OUTPATIENT)
Age: 64
End: 2019-08-14

## 2019-08-28 ENCOUNTER — APPOINTMENT (OUTPATIENT)
Dept: INTERNAL MEDICINE | Facility: CLINIC | Age: 64
End: 2019-08-28
Payer: COMMERCIAL

## 2019-08-28 VITALS
WEIGHT: 236 LBS | SYSTOLIC BLOOD PRESSURE: 135 MMHG | HEART RATE: 85 BPM | DIASTOLIC BLOOD PRESSURE: 80 MMHG | HEIGHT: 70 IN | TEMPERATURE: 98.4 F | BODY MASS INDEX: 33.79 KG/M2 | OXYGEN SATURATION: 98 % | RESPIRATION RATE: 16 BRPM

## 2019-08-28 PROCEDURE — 99213 OFFICE O/P EST LOW 20 MIN: CPT

## 2019-09-16 ENCOUNTER — APPOINTMENT (OUTPATIENT)
Dept: ENDOCRINOLOGY | Facility: CLINIC | Age: 64
End: 2019-09-16

## 2019-09-30 ENCOUNTER — OUTPATIENT (OUTPATIENT)
Dept: OUTPATIENT SERVICES | Facility: HOSPITAL | Age: 64
LOS: 1 days | Discharge: HOME | End: 2019-09-30

## 2019-09-30 DIAGNOSIS — D68.2 HEREDITARY DEFICIENCY OF OTHER CLOTTING FACTORS: ICD-10-CM

## 2019-10-01 ENCOUNTER — APPOINTMENT (OUTPATIENT)
Dept: ENDOCRINOLOGY | Facility: CLINIC | Age: 64
End: 2019-10-01
Payer: COMMERCIAL

## 2019-10-01 VITALS
DIASTOLIC BLOOD PRESSURE: 82 MMHG | BODY MASS INDEX: 34.65 KG/M2 | HEIGHT: 70 IN | OXYGEN SATURATION: 96 % | HEART RATE: 81 BPM | SYSTOLIC BLOOD PRESSURE: 150 MMHG | WEIGHT: 242 LBS

## 2019-10-01 LAB — GLUCOSE BLDC GLUCOMTR-MCNC: 193

## 2019-10-01 PROCEDURE — 99214 OFFICE O/P EST MOD 30 MIN: CPT | Mod: 25

## 2019-10-01 PROCEDURE — G0008: CPT

## 2019-10-01 PROCEDURE — 90674 CCIIV4 VAC NO PRSV 0.5 ML IM: CPT

## 2019-10-01 PROCEDURE — 82962 GLUCOSE BLOOD TEST: CPT

## 2019-10-01 RX ORDER — BLOOD-GLUCOSE METER
KIT MISCELLANEOUS
Qty: 1 | Refills: 0 | Status: ACTIVE | COMMUNITY
Start: 2019-02-20

## 2019-10-01 NOTE — DATA REVIEWED
[FreeTextEntry1] : Lantus 22 units at bedtime. \par metformin 1000mg bid \par glipizide 10mg twice daily

## 2019-10-01 NOTE — HISTORY OF PRESENT ILLNESS
[___] : [unfilled] [FreeTextEntry1] : Patient is a -year-old pleasant 64-year-old male here for followup for his type 2 diabetes.  He was diagnosed with type 2 diabetes in 1990s.  His A1c is relatively uncontrolled.  Today was 8.1%.  There is no diabetic retinopathy, there is some numbness in his pinkies.  There is no diabetic nephropathy, EGFR is 70.  \par Patient has no known microvascular complication including CAD, CHF CVA in the past.\par \par Patient had a history of renal cancer, status post resection in April 2017.  He did not require any chemotherapy.  He follows up with .  \par \par Regarding his diabetic regimen, patient currently takes Lantus 22 units at bedtime, metformin 1000 mg twice daily, and glipizide 10 mg twice daily.\par \par Presenting hypertension, patient is currently taking lisinopril 30 mg daily, this dose was reduced from 40 mg daily by his nephrologist.\par \par Regarding hyperlipidemia, patient currently takes the Prozac 40 mg daily.  Reports no myalgias or myopathy.\par \par

## 2019-10-01 NOTE — ASSESSMENT
[Carbohydrate Consistent Diet] : carbohydrate consistent diet [Hypoglycemia Management] : hypoglycemia management [Ketone Testing] : ketone testing [Diabetes Foot Care] : diabetes foot care [Long Term Vascular Complications] : long term vascular complications of diabetes [Importance of Diet and Exercise] : importance of diet and exercise to improve glycemic control, achieve weight loss and improve cardiovascular health [Action and use of Insulin] : action and use of short and long-acting insulin [Self Monitoring of Blood Glucose] : self monitoring of blood glucose [Insulin Self-Administration] : insulin self-administration [Injection Technique, Storage, Sharps Disposal] : injection technique, storage, and sharps disposal [Retinopathy Screening] : Patient was referred to ophthalmology for retinopathy screening [FreeTextEntry1] : 64 -year-old male with poorly controlled type 2 diabetes and overweight.\par \par #1.Type 2 diabetes:\par Will increase Lantus to 26 units at bedtime.  \par Will continue with metformin 1000mg bid \par Will continue with glipizide 10mg twice daily \par I went over treatment of hypoglycemia\par He is to call me if there's any high or low glucose <70 or above 250mg/dl.  \par Microalbumin 184  mg/g check last visit Oct 2018, following up with neprhologist.  \par He hasn't been up to date with podiatry. Foot exam 02/20/2019 by me\par Up to date with opthalmology. \par \par #2.Hyperlipidemia: LDL was 57 mg/dl in March 2019. Continue with statin therapy. \par \par #3. Hypertension: cont lisinopril 40 mg, amlodipine 5mg daily normal BP. +microalbumin and is following up with nephrologist.  \par \par \par Diabetes Health Care Maintenance\par - Opthalmology up to date: Yes\par -Podiatry up to date: Yes\par -Antiplatelet agent: yes\par  -Urine microalbumin: +, Oct 2018, will repeat in 3 months. on ACEi\par -ACE-I/ARB: yes\par -Patient to call for persistent glucose < 70 or > 300\par -Discussed hypoglycemic protocol.  \par -Yearly flu vaccine recommended \par -Vitamin B12 normal, elevated, will decrease to every other day.  \par -Lipid panel check next visit. \par -Statin therapy: yes\par \par EDUCATION: Reviewed 'ABC' of diabetes management (respective goals in parentheses): A1C (<7), blood pressure (<140/90), and cholesterol (LDL <100).\par \par COMPLIANCE at present is estimated to be: good  \par FOLLOW UP: I recommend that frequency of visits for diabetes care be every 3 month \par  \par \par Followup in 3-4 months

## 2019-10-02 ENCOUNTER — APPOINTMENT (OUTPATIENT)
Dept: HEMATOLOGY ONCOLOGY | Facility: CLINIC | Age: 64
End: 2019-10-02
Payer: COMMERCIAL

## 2019-10-02 ENCOUNTER — OUTPATIENT (OUTPATIENT)
Dept: OUTPATIENT SERVICES | Facility: HOSPITAL | Age: 64
LOS: 1 days | End: 2019-10-02
Payer: COMMERCIAL

## 2019-10-02 ENCOUNTER — RESULT REVIEW (OUTPATIENT)
Age: 64
End: 2019-10-02

## 2019-10-02 VITALS
WEIGHT: 244.71 LBS | HEART RATE: 76 BPM | DIASTOLIC BLOOD PRESSURE: 95 MMHG | BODY MASS INDEX: 34.26 KG/M2 | TEMPERATURE: 98.2 F | OXYGEN SATURATION: 98 % | HEIGHT: 70.87 IN | SYSTOLIC BLOOD PRESSURE: 183 MMHG | RESPIRATION RATE: 16 BRPM

## 2019-10-02 DIAGNOSIS — Z87.891 PERSONAL HISTORY OF NICOTINE DEPENDENCE: ICD-10-CM

## 2019-10-02 DIAGNOSIS — I10 ESSENTIAL (PRIMARY) HYPERTENSION: ICD-10-CM

## 2019-10-02 LAB
BASOPHILS # BLD AUTO: 0 K/UL — SIGNIFICANT CHANGE UP (ref 0–0.2)
BASOPHILS NFR BLD AUTO: 0.1 % — SIGNIFICANT CHANGE UP (ref 0–2)
EOSINOPHIL # BLD AUTO: 0.3 K/UL — SIGNIFICANT CHANGE UP (ref 0–0.5)
EOSINOPHIL NFR BLD AUTO: 4.7 % — SIGNIFICANT CHANGE UP (ref 0–6)
HCT VFR BLD CALC: 44.6 % — SIGNIFICANT CHANGE UP (ref 39–50)
HGB BLD-MCNC: 15.1 G/DL — SIGNIFICANT CHANGE UP (ref 13–17)
LYMPHOCYTES # BLD AUTO: 1.4 K/UL — SIGNIFICANT CHANGE UP (ref 1–3.3)
LYMPHOCYTES # BLD AUTO: 19.3 % — SIGNIFICANT CHANGE UP (ref 13–44)
MCHC RBC-ENTMCNC: 30.9 PG — SIGNIFICANT CHANGE UP (ref 27–34)
MCHC RBC-ENTMCNC: 33.9 G/DL — SIGNIFICANT CHANGE UP (ref 32–36)
MCV RBC AUTO: 91.2 FL — SIGNIFICANT CHANGE UP (ref 80–100)
MONOCYTES # BLD AUTO: 0.6 K/UL — SIGNIFICANT CHANGE UP (ref 0–0.9)
MONOCYTES NFR BLD AUTO: 7.8 % — SIGNIFICANT CHANGE UP (ref 2–14)
NEUTROPHILS # BLD AUTO: 5 K/UL — SIGNIFICANT CHANGE UP (ref 1.8–7.4)
NEUTROPHILS NFR BLD AUTO: 68.1 % — SIGNIFICANT CHANGE UP (ref 43–77)
PLATELET # BLD AUTO: 158 K/UL — SIGNIFICANT CHANGE UP (ref 150–400)
RBC # BLD: 4.89 M/UL — SIGNIFICANT CHANGE UP (ref 4.2–5.8)
RBC # FLD: 13.6 % — SIGNIFICANT CHANGE UP (ref 10.3–14.5)
WBC # BLD: 7.3 K/UL — SIGNIFICANT CHANGE UP (ref 3.8–10.5)
WBC # FLD AUTO: 7.3 K/UL — SIGNIFICANT CHANGE UP (ref 3.8–10.5)

## 2019-10-02 PROCEDURE — 99203 OFFICE O/P NEW LOW 30 MIN: CPT

## 2019-10-02 PROCEDURE — G0452: CPT | Mod: 26

## 2019-10-02 PROCEDURE — 81241 F5 GENE: CPT

## 2019-10-05 NOTE — ASSESSMENT
[FreeTextEntry1] : This is a 64 year old man with a history of insulin dependant diabetes, hypertension, hyperlipidemia.  He presents for the evaluation of a family history of Factor V Leiden.  He has no personal history of thromboembolism.  Due to this will run the Factor V Leiden and activated protein C resistance. The rest of the hypercoagulable state workup is unwarranted.  Suspect the gum bleeding he describes was anatomic or dental in nature.  \par \par Spent > 30 minutes in direct patient care and and addressed all questions and concerns.  >50% of this time was in direct face to face contact with patient during exam and counseling. \par The consultation room and exam room door was closed whenever appropriate for the duration of the consultation.

## 2019-10-05 NOTE — REASON FOR VISIT
[Initial Consultation] : an initial consultation for [FreeTextEntry2] : Family history of Factor V Leiden

## 2019-10-05 NOTE — HISTORY OF PRESENT ILLNESS
[de-identified] : This is a 64 year old man with a history of diabetes on Lantus and glipizide and metformin at 38 years of age, resected kidney cancer in remission,  HTN ,hyperlipidemia.  \par He presents with the company of his wife Bel for the evaluation of a family history of Factor V Leiden. His 35 year old daughter was recently diagnosed with Factor V Leiden during a gynecologic workup.  Jonathan himself does not have a personal history of VTE.  He does describe a history of a  gum surgery had a large blood clot on the upper pallet.  Also his sister had the same problem.  \par \osvaldo Was a heavy smoker quit in 1987 no hx of VTE.

## 2019-10-10 LAB — DNA PLOIDY SPEC FC-IMP: SIGNIFICANT CHANGE UP

## 2019-10-11 ENCOUNTER — TRANSCRIPTION ENCOUNTER (OUTPATIENT)
Age: 64
End: 2019-10-11

## 2019-10-11 LAB
APCR PPP: 1.83 RATIO
FVL BLANK: 41.3
FVL TEST: 75.5

## 2019-10-17 ENCOUNTER — RESULT REVIEW (OUTPATIENT)
Age: 64
End: 2019-10-17

## 2019-10-17 RX ORDER — BLOOD SUGAR DIAGNOSTIC
STRIP MISCELLANEOUS
Qty: 400 | Refills: 1 | Status: ACTIVE | COMMUNITY
Start: 2019-04-09 | End: 1900-01-01

## 2019-10-17 RX ORDER — LANCETS 28 GAUGE
EACH MISCELLANEOUS
Qty: 4 | Refills: 1 | Status: ACTIVE | COMMUNITY
Start: 2019-04-09 | End: 1900-01-01

## 2019-11-06 NOTE — ASU PREOP CHECKLIST - WAS PATIENT ON BETA BLOCKER?
No
I personally evaluated the patient. I reviewed the Resident’s or Physician Assistant’s note (as assigned above), and agree with the findings and plan except as documented in my note.

## 2019-12-17 ENCOUNTER — APPOINTMENT (OUTPATIENT)
Dept: UROLOGY | Facility: CLINIC | Age: 64
End: 2019-12-17

## 2020-01-08 ENCOUNTER — RX RENEWAL (OUTPATIENT)
Age: 65
End: 2020-01-08

## 2020-01-11 ENCOUNTER — OUTPATIENT (OUTPATIENT)
Dept: OUTPATIENT SERVICES | Facility: HOSPITAL | Age: 65
LOS: 1 days | Discharge: HOME | End: 2020-01-11
Payer: MEDICARE

## 2020-01-11 ENCOUNTER — APPOINTMENT (OUTPATIENT)
Dept: MRI IMAGING | Facility: HOSPITAL | Age: 65
End: 2020-01-11

## 2020-01-11 DIAGNOSIS — L97.509 NON-PRESSURE CHRONIC ULCER OF OTHER PART OF UNSPECIFIED FOOT WITH UNSPECIFIED SEVERITY: ICD-10-CM

## 2020-01-11 PROCEDURE — 73718 MRI LOWER EXTREMITY W/O DYE: CPT | Mod: 26,RT

## 2020-01-13 ENCOUNTER — APPOINTMENT (OUTPATIENT)
Dept: ENDOCRINOLOGY | Facility: CLINIC | Age: 65
End: 2020-01-13

## 2020-01-17 ENCOUNTER — APPOINTMENT (OUTPATIENT)
Dept: ENDOCRINOLOGY | Facility: CLINIC | Age: 65
End: 2020-01-17
Payer: COMMERCIAL

## 2020-01-17 VITALS
OXYGEN SATURATION: 97 % | WEIGHT: 240 LBS | SYSTOLIC BLOOD PRESSURE: 168 MMHG | DIASTOLIC BLOOD PRESSURE: 90 MMHG | HEART RATE: 93 BPM | BODY MASS INDEX: 33.6 KG/M2 | HEIGHT: 70.87 IN

## 2020-01-17 LAB
GLUCOSE BLDC GLUCOMTR-MCNC: 144
HBA1C MFR BLD HPLC: 8.2

## 2020-01-17 PROCEDURE — 82962 GLUCOSE BLOOD TEST: CPT

## 2020-01-17 PROCEDURE — 83036 HEMOGLOBIN GLYCOSYLATED A1C: CPT | Mod: QW

## 2020-01-17 PROCEDURE — 99214 OFFICE O/P EST MOD 30 MIN: CPT | Mod: 25

## 2020-01-17 NOTE — HISTORY OF PRESENT ILLNESS
[FreeTextEntry1] : Patient is a -year-old pleasant 64-year-old male here for followup for his type 2 diabetes.  He was diagnosed with type 2 diabetes in 1990s.  His A1c is relatively uncontrolled.  Today was 8.1%.  There is no diabetic retinopathy, there is some numbness in his pinkies.  There is no diabetic nephropathy, EGFR is 70.  \par \par Patient has no known microvascular complication including CAD, CHF CVA in the past.\par \par Patient had a history of renal cancer, status post resection in April 2017.  He did not require any chemotherapy.  He follows up with .  \par \par Regarding his diabetic regimen, patient currently takes Lantus 40 units at bedtime, he had been increasing it, metformin 1000 mg twice daily, and glipizide 10 mg twice daily.\par \par Presenting hypertension, patient is currently taking lisinopril 30 mg daily, this dose was reduced from 40 mg daily by his nephrologist. He is looking for a new neprhologist as this previously one is no longer covered by his insurance.  \par \par Regarding hyperlipidemia, patient currently takes the atorvastatin 40 mg daily.  Reports no myalgias or myopathy. \par \par There was a lot of birthdays during Nov and now.  He has been eating out a lot. His mother also passed away in Nov 2019.  She was 101.  He reports that he hasn't been adherent with diet.  He had an infection in his toe. This was also in Nov 2019.  He went for MRI last Saturday there was no osteomyelitis.  He was on PO antibiotics and now he was instructed to put topical abx, was asked to put in vinegar as instruction of his podiatrist.  (Dr. Eve Escalante) \par \par

## 2020-01-17 NOTE — ASSESSMENT
[FreeTextEntry1] : 64 -year-old male with uncontrolled Type 2 DM, A1c 8.2% today, has been hovering around 8 range, complicated by diabetic foot ulcer, HTN, HLD, here for follow up.  \par \par #1.Type 2 diabetes:\par Will increase Lantus to 40 units at bedtime.  \par Will continue with metformin 1000mg bid \par Will continue with glipizide 10mg twice daily \par He had taken Trulcity in the past and he was having a lot of nausea.  Willing to re-try it. \par We discussed the importance of dietary changes as I think that's the biggest challenge for him.  \par I went over treatment of hypoglycemia\par He is to call me if there's any high or low glucose <70 or above 250mg/dl.  \par Microalbumin 184  mg/g check last visit Oct 2018, following up with neprhologist.  \par He hasn't been up to date with podiatry. Foot exam 02/20/2019 by me\par Up to date with opthalmology. \par \par #2.Hyperlipidemia: LDL was 57 mg/dl in March 2019. Continue with statin therapy. \par \par #3. Hypertension: His blood pressure is persistent elevated, cont lisinopril 40 mg; will add amlodipine 5mg daily normal BP. +microalbumin and is following up with nephrologist.   Referral given to new neprhologist.  \par \par \par Diabetes Health Care Maintenance\par - Opthalmology up to date: Yes\par -Podiatry up to date: Yes\par -Antiplatelet agent: yes\par  -Urine microalbumin: +, Oct 2018, will repeat in 3 months. on ACEi\par -ACE-I/ARB: yes\par -Patient to call for persistent glucose < 70 or > 300\par -Discussed hypoglycemic protocol.  \par -Yearly flu vaccine recommended \par -Vitamin B12 normal, elevated, will decrease to every other day.  \par -Lipid panel check next visit. \par -Statin therapy: yes\par \par EDUCATION: Reviewed 'ABC' of diabetes management (respective goals in parentheses): A1C (<7), blood pressure (<140/90), and cholesterol (LDL <100).\par \par COMPLIANCE at present is estimated to be: good  \par FOLLOW UP: I recommend that frequency of visits for diabetes care be every 3 month \par  \par \par Followup in 3-4 months [Carbohydrate Consistent Diet] : carbohydrate consistent diet [Hypoglycemia Management] : hypoglycemia management [Diabetes Foot Care] : diabetes foot care [Long Term Vascular Complications] : long term vascular complications of diabetes [Importance of Diet and Exercise] : importance of diet and exercise to improve glycemic control, achieve weight loss and improve cardiovascular health [Action and use of Insulin] : action and use of short and long-acting insulin [Self Monitoring of Blood Glucose] : self monitoring of blood glucose [Insulin Self-Administration] : insulin self-administration [Injection Technique, Storage, Sharps Disposal] : injection technique, storage, and sharps disposal [Retinopathy Screening] : Patient was referred to ophthalmology for retinopathy screening [Incretin Mimetic Therapy] : Risks and benefits of incretin mimetic therapy were discussed with the patient including nauseau, pancreatitis and potential risk of medullary thyroid cancer

## 2020-01-17 NOTE — PHYSICAL EXAM
[Alert] : alert [No Acute Distress] : no acute distress [EOMI] : extra ocular movement intact [Thyroid Not Enlarged] : the thyroid was not enlarged [Clear to Auscultation] : lungs were clear to auscultation bilaterally [Normal S1, S2] : normal S1 and S2 [Regular Rhythm] : with a regular rhythm [Pedal Pulses Normal] : the pedal pulses are present [No Edema] : there was no peripheral edema [Normal Bowel Sounds] : normal bowel sounds [Not Tender] : non-tender [No Spinal Tenderness] : no spinal tenderness [Normal Gait] : normal gait [Foot Ulcers] : no foot ulcers [No Tremors] : no tremors [Normal Affect] : the affect was normal

## 2020-02-21 ENCOUNTER — APPOINTMENT (OUTPATIENT)
Dept: INTERNAL MEDICINE | Facility: CLINIC | Age: 65
End: 2020-02-21
Payer: COMMERCIAL

## 2020-02-21 ENCOUNTER — NON-APPOINTMENT (OUTPATIENT)
Age: 65
End: 2020-02-21

## 2020-02-21 VITALS
RESPIRATION RATE: 17 BRPM | HEART RATE: 86 BPM | OXYGEN SATURATION: 98 % | HEIGHT: 70 IN | DIASTOLIC BLOOD PRESSURE: 92 MMHG | SYSTOLIC BLOOD PRESSURE: 170 MMHG | WEIGHT: 238 LBS | BODY MASS INDEX: 34.07 KG/M2 | TEMPERATURE: 97.7 F

## 2020-02-21 DIAGNOSIS — Z12.11 ENCOUNTER FOR SCREENING FOR MALIGNANT NEOPLASM OF COLON: ICD-10-CM

## 2020-02-21 DIAGNOSIS — Z23 ENCOUNTER FOR IMMUNIZATION: ICD-10-CM

## 2020-02-21 LAB
BASOPHILS # BLD AUTO: 0.02 K/UL
BASOPHILS NFR BLD AUTO: 0.3 %
EOSINOPHIL # BLD AUTO: 0.37 K/UL
EOSINOPHIL NFR BLD AUTO: 6.2 %
HCT VFR BLD CALC: 42.7 %
HGB BLD-MCNC: 14 G/DL
IMM GRANULOCYTES NFR BLD AUTO: 0.2 %
LYMPHOCYTES # BLD AUTO: 1.43 K/UL
LYMPHOCYTES NFR BLD AUTO: 24 %
MAN DIFF?: NORMAL
MCHC RBC-ENTMCNC: 29.3 PG
MCHC RBC-ENTMCNC: 32.8 GM/DL
MCV RBC AUTO: 89.3 FL
MONOCYTES # BLD AUTO: 0.48 K/UL
MONOCYTES NFR BLD AUTO: 8 %
NEUTROPHILS # BLD AUTO: 3.66 K/UL
NEUTROPHILS NFR BLD AUTO: 61.3 %
PLATELET # BLD AUTO: 185 K/UL
RBC # BLD: 4.78 M/UL
RBC # FLD: 13.1 %
WBC # FLD AUTO: 5.97 K/UL

## 2020-02-21 PROCEDURE — 99386 PREV VISIT NEW AGE 40-64: CPT | Mod: 25

## 2020-02-21 PROCEDURE — 90471 IMMUNIZATION ADMIN: CPT

## 2020-02-21 PROCEDURE — 99213 OFFICE O/P EST LOW 20 MIN: CPT | Mod: 25

## 2020-02-21 PROCEDURE — 90715 TDAP VACCINE 7 YRS/> IM: CPT

## 2020-02-21 NOTE — HEALTH RISK ASSESSMENT
[Good] : ~his/her~  mood as  good [No] : No [No falls in past year] : Patient reported no falls in the past year [Never (0 pts)] : Never (0 points) [1 or 2 (0 pts)] : 1 or 2 (0 points) [0] : 2) Feeling down, depressed, or hopeless: Not at all (0) [HIV test declined] : HIV test declined [Patient reported colonoscopy was normal] : Patient reported colonoscopy was normal [Hepatitis C test declined] : Hepatitis C test declined [With Family] : lives with family [None] : None [Retired] : retired [] :  [High School] : high school [Sexually Active] : sexually active [Feels Safe at Home] : Feels safe at home [Fully functional (bathing, dressing, toileting, transferring, walking, feeding)] : Fully functional (bathing, dressing, toileting, transferring, walking, feeding) [Fully functional (using the telephone, shopping, preparing meals, housekeeping, doing laundry, using] : Fully functional and needs no help or supervision to perform IADLs (using the telephone, shopping, preparing meals, housekeeping, doing laundry, using transportation, managing medications and managing finances) [Carbon Monoxide Detector] : carbon monoxide detector [Smoke Detector] : smoke detector [Seat Belt] :  uses seat belt [] : No [Change in mental status noted] : No change in mental status noted [Reports changes in vision] : Reports no changes in vision [Reports changes in hearing] : Reports no changes in hearing [Reports changes in dental health] : Reports no changes in dental health [ColonoscopyDate] : 2016 [Guns at Home] : no guns at home [ColonoscopyComments] : diverticulosis

## 2020-02-21 NOTE — PHYSICAL EXAM
[Well Nourished] : well nourished [No Acute Distress] : no acute distress [PERRL] : pupils equal round and reactive to light [Well-Appearing] : well-appearing [Normal Sclera/Conjunctiva] : normal sclera/conjunctiva [Well Developed] : well developed [EOMI] : extraocular movements intact [Normal Outer Ear/Nose] : the outer ears and nose were normal in appearance [No JVD] : no jugular venous distention [No Lymphadenopathy] : no lymphadenopathy [Normal Oropharynx] : the oropharynx was normal [No Respiratory Distress] : no respiratory distress  [Supple] : supple [Thyroid Normal, No Nodules] : the thyroid was normal and there were no nodules present [Clear to Auscultation] : lungs were clear to auscultation bilaterally [No Accessory Muscle Use] : no accessory muscle use [Normal Rate] : normal rate  [Regular Rhythm] : with a regular rhythm [No Murmur] : no murmur heard [Normal S1, S2] : normal S1 and S2 [No Carotid Bruits] : no carotid bruits [No Abdominal Bruit] : a ~M bruit was not heard ~T in the abdomen [Pedal Pulses Present] : the pedal pulses are present [No Palpable Aorta] : no palpable aorta [No Edema] : there was no peripheral edema [Non Tender] : non-tender [Soft] : abdomen soft [No Extremity Clubbing/Cyanosis] : no extremity clubbing/cyanosis [Non-distended] : non-distended [No Masses] : no abdominal mass palpated [No HSM] : no HSM [Normal Anterior Cervical Nodes] : no anterior cervical lymphadenopathy [Normal Posterior Cervical Nodes] : no posterior cervical lymphadenopathy [Normal Bowel Sounds] : normal bowel sounds [No CVA Tenderness] : no CVA  tenderness [No Joint Swelling] : no joint swelling [No Spinal Tenderness] : no spinal tenderness [No Rash] : no rash [Coordination Grossly Intact] : coordination grossly intact [Grossly Normal Strength/Tone] : grossly normal strength/tone [Deep Tendon Reflexes (DTR)] : deep tendon reflexes were 2+ and symmetric [No Focal Deficits] : no focal deficits [Normal Gait] : normal gait [Normal Insight/Judgement] : insight and judgment were intact [Normal Affect] : the affect was normal [de-identified] : large varicose veins lower extremities

## 2020-02-21 NOTE — HISTORY OF PRESENT ILLNESS
[de-identified] : 64 years old male with type 2 diabetes uncontrolled, hypertension; here for annual health examination; offers no acute complains, requesting Tdap vaccine today, recently saw endocrinologist for uncontrolled diabetes had medications adjusted

## 2020-02-21 NOTE — PLAN
[FreeTextEntry1] : annual health maintenance labs done today; diet and medication compliance discussed, reinforced.\par Dietary counseling given, dietary avoidance discussed, diet and exercise reviewed with patient; patient reminded of importance of aerobic exercise, weight control, dietary compliance and regular glucose monitoring\par ophthalmology referral for diabetes eyes screening; Tdap vaccine today; will follow up in two weeks for labs results review and discussion

## 2020-02-24 LAB
25(OH)D3 SERPL-MCNC: 32.2 NG/ML
ALBUMIN SERPL ELPH-MCNC: 4.7 G/DL
ALP BLD-CCNC: 75 U/L
ALT SERPL-CCNC: 22 U/L
ANION GAP SERPL CALC-SCNC: 13 MMOL/L
APPEARANCE: CLEAR
AST SERPL-CCNC: 19 U/L
BACTERIA: NEGATIVE
BILIRUB SERPL-MCNC: 0.8 MG/DL
BILIRUBIN URINE: NEGATIVE
BLOOD URINE: NEGATIVE
BUN SERPL-MCNC: 20 MG/DL
CALCIUM SERPL-MCNC: 9.9 MG/DL
CHLORIDE SERPL-SCNC: 105 MMOL/L
CHOLEST SERPL-MCNC: 90 MG/DL
CHOLEST/HDLC SERPL: 3.2 RATIO
CO2 SERPL-SCNC: 24 MMOL/L
COLOR: YELLOW
CREAT SERPL-MCNC: 1.07 MG/DL
CREAT SPEC-SCNC: 105 MG/DL
ESTIMATED AVERAGE GLUCOSE: 189 MG/DL
GLUCOSE QUALITATIVE U: ABNORMAL
GLUCOSE SERPL-MCNC: 173 MG/DL
HBA1C MFR BLD HPLC: 8.2 %
HDLC SERPL-MCNC: 28 MG/DL
HYALINE CASTS: 0 /LPF
KETONES URINE: NEGATIVE
LDLC SERPL CALC-MCNC: 47 MG/DL
LEUKOCYTE ESTERASE URINE: NEGATIVE
MICROALBUMIN 24H UR DL<=1MG/L-MCNC: 24.7 MG/DL
MICROALBUMIN/CREAT 24H UR-RTO: 235 MG/G
MICROSCOPIC-UA: NORMAL
NITRITE URINE: NEGATIVE
PH URINE: 6
POTASSIUM SERPL-SCNC: 4.9 MMOL/L
PROT SERPL-MCNC: 7.2 G/DL
PROTEIN URINE: ABNORMAL
PSA FREE FLD-MCNC: 38 %
PSA FREE SERPL-MCNC: 0.59 NG/ML
PSA SERPL-MCNC: 1.56 NG/ML
RED BLOOD CELLS URINE: 1 /HPF
SODIUM SERPL-SCNC: 142 MMOL/L
SPECIFIC GRAVITY URINE: >=1.03
SQUAMOUS EPITHELIAL CELLS: 0 /HPF
T4 FREE SERPL-MCNC: 1.3 NG/DL
TRIGL SERPL-MCNC: 77 MG/DL
TSH SERPL-ACNC: 2.42 UIU/ML
UROBILINOGEN URINE: NORMAL
VIT B12 SERPL-MCNC: 750 PG/ML
WHITE BLOOD CELLS URINE: 0 /HPF

## 2020-03-03 ENCOUNTER — APPOINTMENT (OUTPATIENT)
Dept: UROLOGY | Facility: CLINIC | Age: 65
End: 2020-03-03
Payer: COMMERCIAL

## 2020-03-03 ENCOUNTER — OUTPATIENT (OUTPATIENT)
Dept: OUTPATIENT SERVICES | Facility: HOSPITAL | Age: 65
LOS: 1 days | End: 2020-03-03
Payer: COMMERCIAL

## 2020-03-03 DIAGNOSIS — R35.0 FREQUENCY OF MICTURITION: ICD-10-CM

## 2020-03-03 PROCEDURE — 76775 US EXAM ABDO BACK WALL LIM: CPT | Mod: 26

## 2020-03-03 PROCEDURE — 99214 OFFICE O/P EST MOD 30 MIN: CPT | Mod: 25

## 2020-03-03 PROCEDURE — 76775 US EXAM ABDO BACK WALL LIM: CPT

## 2020-03-06 ENCOUNTER — APPOINTMENT (OUTPATIENT)
Dept: INTERNAL MEDICINE | Facility: CLINIC | Age: 65
End: 2020-03-06
Payer: COMMERCIAL

## 2020-03-06 VITALS
RESPIRATION RATE: 17 BRPM | HEART RATE: 81 BPM | WEIGHT: 239 LBS | BODY MASS INDEX: 34.22 KG/M2 | TEMPERATURE: 97.8 F | SYSTOLIC BLOOD PRESSURE: 155 MMHG | DIASTOLIC BLOOD PRESSURE: 89 MMHG | HEIGHT: 70 IN | OXYGEN SATURATION: 96 %

## 2020-03-06 PROCEDURE — 99214 OFFICE O/P EST MOD 30 MIN: CPT

## 2020-03-06 NOTE — PLAN
[FreeTextEntry1] : 1.type 2 diabetes uncontrolled, diet compliance stressed, reinforced.\par Dietary counseling given, dietary avoidance discussed, diet and exercise reviewed with patient; patient reminded of importance of aerobic exercise, weight control, dietary compliance and regular glucose monitoring\par 2. hypertension stable continue same medications\par 3. proteinuria; nephrologist consult\par follow up in three months to monitor A1C, renal function\par

## 2020-03-06 NOTE — HISTORY OF PRESENT ILLNESS
[FreeTextEntry1] : follow up for labs results review and discussion [de-identified] : patient here today to review and discuss labs results, no acute complains\par

## 2020-03-11 DIAGNOSIS — C64.9 MALIGNANT NEOPLASM OF UNSPECIFIED KIDNEY, EXCEPT RENAL PELVIS: ICD-10-CM

## 2020-04-27 ENCOUNTER — APPOINTMENT (OUTPATIENT)
Dept: ENDOCRINOLOGY | Facility: CLINIC | Age: 65
End: 2020-04-27
Payer: COMMERCIAL

## 2020-04-27 PROCEDURE — 99443: CPT

## 2020-04-27 RX ORDER — DULAGLUTIDE 0.75 MG/.5ML
0.75 INJECTION, SOLUTION SUBCUTANEOUS
Qty: 4 | Refills: 5 | Status: DISCONTINUED | COMMUNITY
Start: 2020-01-17 | End: 2020-04-27

## 2020-04-29 ENCOUNTER — APPOINTMENT (OUTPATIENT)
Dept: INTERNAL MEDICINE | Facility: CLINIC | Age: 65
End: 2020-04-29

## 2020-05-14 ENCOUNTER — APPOINTMENT (OUTPATIENT)
Dept: INTERNAL MEDICINE | Facility: CLINIC | Age: 65
End: 2020-05-14
Payer: COMMERCIAL

## 2020-05-14 PROCEDURE — 99213 OFFICE O/P EST LOW 20 MIN: CPT | Mod: 95

## 2020-05-14 RX ORDER — NAPROXEN 500 MG/1
500 TABLET ORAL
Qty: 15 | Refills: 0 | Status: ACTIVE | COMMUNITY
Start: 2020-05-14 | End: 1900-01-01

## 2020-05-14 NOTE — HISTORY OF PRESENT ILLNESS
[Home] : at home, [unfilled] , at the time of the visit. [Medical Office: ([facility])___] : at the medical office located in  [Patient] : the patient [Self] : self [FreeTextEntry8] : Telehealth visit was delivered after patient consent was obtained; patient agrees to discuss health issues via video conference. Risk/limitations involved in medical telehealth visits were reviewed, including limitations of physical exam. Patient was asked for identifiers by stating name and ; patient was asked to place him/her self in private area. Patient understand that an office visit may be indicated after telehealth visit if no symptomatic improvement. Patient should contact us for worsening symptoms or go to ER.\par 64 years old male complains of edema left hand for about two weeks, pain on fingers joints , states edema and pain worse at night, he denies any recent trauma, no wounds or scratches on hand, denies erythema but he feels hand warmth, denies fever; he took some Motrim with relief; he states had episode of diarrhea with low grade fever last month, unsure if could have been covid infection.\par time spent 15 minutes

## 2020-05-14 NOTE — PLAN
[FreeTextEntry1] : 1.left hand pain/edema start NSAID as needed, advised to keep hand elevated while is swollen; obtain x rays ; check blood count labs ordered\par 2. type 2 diabetes followed by endocrinologist last month states he has been compliant , glucose controlled\par labs ordered for a1c, cmp, lipids\par will call back to follow up next week.

## 2020-05-14 NOTE — PHYSICAL EXAM
[Normal] : normal sclera/conjunctiva, pupils are equal, round and reactive to light and extraocular movements are intact [No Respiratory Distress] : no respiratory distress  [No Accessory Muscle Use] : no accessory muscle use [de-identified] : patient appears well no acute distress [de-identified] : left hand appears edematous , no erythema, patient able to move fingers

## 2020-05-14 NOTE — REVIEW OF SYSTEMS
[Joint Pain] : joint pain [Joint Swelling] : joint swelling [Negative] : Heme/Lymph [FreeTextEntry9] : left hand

## 2020-05-15 ENCOUNTER — APPOINTMENT (OUTPATIENT)
Dept: RADIOLOGY | Facility: CLINIC | Age: 65
End: 2020-05-15
Payer: COMMERCIAL

## 2020-05-15 ENCOUNTER — OUTPATIENT (OUTPATIENT)
Dept: OUTPATIENT SERVICES | Facility: HOSPITAL | Age: 65
LOS: 1 days | End: 2020-05-15
Payer: COMMERCIAL

## 2020-05-15 DIAGNOSIS — R60.0 LOCALIZED EDEMA: ICD-10-CM

## 2020-05-15 PROCEDURE — 73130 X-RAY EXAM OF HAND: CPT | Mod: 26,LT

## 2020-05-15 PROCEDURE — 73130 X-RAY EXAM OF HAND: CPT

## 2020-05-16 LAB
ALBUMIN SERPL ELPH-MCNC: 4.6 G/DL
ALP BLD-CCNC: 86 U/L
ALT SERPL-CCNC: 17 U/L
ANION GAP SERPL CALC-SCNC: 13 MMOL/L
AST SERPL-CCNC: 14 U/L
BASOPHILS # BLD AUTO: 0.02 K/UL
BASOPHILS NFR BLD AUTO: 0.3 %
BILIRUB SERPL-MCNC: 0.9 MG/DL
BUN SERPL-MCNC: 25 MG/DL
CALCIUM SERPL-MCNC: 9.9 MG/DL
CHLORIDE SERPL-SCNC: 101 MMOL/L
CHOLEST SERPL-MCNC: 105 MG/DL
CHOLEST/HDLC SERPL: 3.3 RATIO
CO2 SERPL-SCNC: 26 MMOL/L
CREAT SERPL-MCNC: 1.11 MG/DL
EOSINOPHIL # BLD AUTO: 0.41 K/UL
EOSINOPHIL NFR BLD AUTO: 5.5 %
ESTIMATED AVERAGE GLUCOSE: 166 MG/DL
GLUCOSE SERPL-MCNC: 160 MG/DL
HBA1C MFR BLD HPLC: 7.4 %
HCT VFR BLD CALC: 40.8 %
HDLC SERPL-MCNC: 32 MG/DL
HGB BLD-MCNC: 13 G/DL
IMM GRANULOCYTES NFR BLD AUTO: 0.3 %
LDLC SERPL CALC-MCNC: 50 MG/DL
LYMPHOCYTES # BLD AUTO: 1.41 K/UL
LYMPHOCYTES NFR BLD AUTO: 18.8 %
MAN DIFF?: NORMAL
MCHC RBC-ENTMCNC: 29.3 PG
MCHC RBC-ENTMCNC: 31.9 GM/DL
MCV RBC AUTO: 92.1 FL
MONOCYTES # BLD AUTO: 0.6 K/UL
MONOCYTES NFR BLD AUTO: 8 %
NEUTROPHILS # BLD AUTO: 5.06 K/UL
NEUTROPHILS NFR BLD AUTO: 67.1 %
PLATELET # BLD AUTO: 212 K/UL
POTASSIUM SERPL-SCNC: 4.7 MMOL/L
PROT SERPL-MCNC: 7.3 G/DL
RBC # BLD: 4.43 M/UL
RBC # FLD: 13.5 %
SODIUM SERPL-SCNC: 140 MMOL/L
TRIGL SERPL-MCNC: 116 MG/DL
URATE SERPL-MCNC: 6.1 MG/DL
WBC # FLD AUTO: 7.52 K/UL

## 2020-05-18 ENCOUNTER — APPOINTMENT (OUTPATIENT)
Dept: INTERNAL MEDICINE | Facility: CLINIC | Age: 65
End: 2020-05-18
Payer: COMMERCIAL

## 2020-05-18 DIAGNOSIS — Z11.59 ENCOUNTER FOR SCREENING FOR OTHER VIRAL DISEASES: ICD-10-CM

## 2020-05-18 DIAGNOSIS — M79.642 PAIN IN LEFT HAND: ICD-10-CM

## 2020-05-18 DIAGNOSIS — R60.0 LOCALIZED EDEMA: ICD-10-CM

## 2020-05-18 PROCEDURE — 99214 OFFICE O/P EST MOD 30 MIN: CPT | Mod: 95

## 2020-05-18 NOTE — PHYSICAL EXAM
[Normal] : normal sclera/conjunctiva, pupils are equal, round and reactive to light and extraocular movements are intact [No Respiratory Distress] : no respiratory distress  [No Accessory Muscle Use] : no accessory muscle use [de-identified] : patient appears well no distress [de-identified] : left hand bandaged

## 2020-05-18 NOTE — HISTORY OF PRESENT ILLNESS
[Home] : at home, [unfilled] , at the time of the visit. [Medical Office: ([facility])___] : at the medical office located in  [Patient] : the patient [Self] : self [FreeTextEntry1] : follow up labs results [de-identified] : Telehealth visit was delivered after patient consent was obtained; patient agrees to discuss health issues via video conference. Risk/limitations involved in medical telehealth visits were reviewed, including limitations of physical exam. Patient was asked for identifiers by stating name and ; patient was asked to place him/her self in private area. Patient understand that an office visit may be indicated after telehealth visit if no symptomatic improvement. Patient should contact us for worsening symptoms or go to ER.\par 64 years old male seen to review and discuss labs results; and x rays left hand, states hand is less swollen , still with soreness; he find relief with bandage, at night is more difficult to move fingers, he is right handed, again denies any trauma; he would like to have COVID 19 antibody test; time spent 25 minutes

## 2020-05-18 NOTE — PLAN
[FreeTextEntry1] : 1.hand pain/edema states pain improved; x rays no significant findings, mild soft tissue edema; referral to orthopedic for evaluation rule out tendonitis\par 2. type 2 diabetes better controlled to continue same medications\par Dietary counseling given, dietary avoidance discussed, diet and exercise reviewed with patient; patient reminded of importance of aerobic exercise, weight control, dietary compliance and regular glucose monitoring\par 3.high cholesterol controlled\par low cholesterol, low triglycerides diet,dietary counseling given; dietary avoidance discussed; diet and exercise reviewed with patient\par COVID 19 antibody test ordered will call with results\par schedule follow up in person in two months. time spent 25 minutes

## 2020-05-18 NOTE — REVIEW OF SYSTEMS
[Joint Pain] : joint pain [Joint Swelling] : joint swelling [Negative] : Psychiatric [FreeTextEntry9] : left hand knuckles pain, edema

## 2020-05-19 ENCOUNTER — APPOINTMENT (OUTPATIENT)
Dept: ORTHOPEDIC SURGERY | Facility: CLINIC | Age: 65
End: 2020-05-19

## 2020-06-04 ENCOUNTER — APPOINTMENT (OUTPATIENT)
Dept: ORTHOPEDIC SURGERY | Facility: CLINIC | Age: 65
End: 2020-06-04
Payer: COMMERCIAL

## 2020-06-04 VITALS
WEIGHT: 238 LBS | HEIGHT: 70 IN | SYSTOLIC BLOOD PRESSURE: 155 MMHG | DIASTOLIC BLOOD PRESSURE: 85 MMHG | BODY MASS INDEX: 34.07 KG/M2 | HEART RATE: 87 BPM

## 2020-06-04 DIAGNOSIS — D22.62 MELANOCYTIC NEVI OF LEFT UPPER LIMB, INCLUDING SHOULDER: ICD-10-CM

## 2020-06-04 PROCEDURE — 20600 DRAIN/INJ JOINT/BURSA W/O US: CPT | Mod: F2

## 2020-06-04 PROCEDURE — 99203 OFFICE O/P NEW LOW 30 MIN: CPT | Mod: 25

## 2020-06-06 RX ORDER — AMOXICILLIN AND CLAVULANATE POTASSIUM 875; 125 MG/1; MG/1
875-125 TABLET, COATED ORAL
Qty: 20 | Refills: 0 | Status: DISCONTINUED | COMMUNITY
Start: 2019-12-13

## 2020-06-06 RX ORDER — MINOCYCLINE HYDROCHLORIDE 100 MG/1
100 CAPSULE ORAL
Qty: 20 | Refills: 0 | Status: DISCONTINUED | COMMUNITY
Start: 2019-12-17

## 2020-06-12 ENCOUNTER — TRANSCRIPTION ENCOUNTER (OUTPATIENT)
Age: 65
End: 2020-06-12

## 2020-06-13 ENCOUNTER — EMERGENCY (EMERGENCY)
Facility: HOSPITAL | Age: 65
LOS: 1 days | Discharge: HOME | End: 2020-06-13
Attending: EMERGENCY MEDICINE
Payer: COMMERCIAL

## 2020-06-13 ENCOUNTER — TRANSCRIPTION ENCOUNTER (OUTPATIENT)
Age: 65
End: 2020-06-13

## 2020-06-13 VITALS
HEIGHT: 70 IN | TEMPERATURE: 98 F | OXYGEN SATURATION: 98 % | DIASTOLIC BLOOD PRESSURE: 86 MMHG | SYSTOLIC BLOOD PRESSURE: 167 MMHG | RESPIRATION RATE: 18 BRPM | HEART RATE: 91 BPM | WEIGHT: 238.1 LBS

## 2020-06-13 VITALS
DIASTOLIC BLOOD PRESSURE: 91 MMHG | RESPIRATION RATE: 16 BRPM | SYSTOLIC BLOOD PRESSURE: 155 MMHG | HEART RATE: 81 BPM | OXYGEN SATURATION: 96 % | TEMPERATURE: 98 F

## 2020-06-13 LAB
ALBUMIN SERPL ELPH-MCNC: 4 G/DL — SIGNIFICANT CHANGE UP (ref 3.3–5)
ALP SERPL-CCNC: 92 U/L — SIGNIFICANT CHANGE UP (ref 40–120)
ALT FLD-CCNC: 15 U/L — SIGNIFICANT CHANGE UP (ref 10–45)
ANION GAP SERPL CALC-SCNC: 12 MMOL/L — SIGNIFICANT CHANGE UP (ref 5–17)
AST SERPL-CCNC: 31 U/L — SIGNIFICANT CHANGE UP (ref 10–40)
BASOPHILS # BLD AUTO: 0.03 K/UL — SIGNIFICANT CHANGE UP (ref 0–0.2)
BASOPHILS NFR BLD AUTO: 0.3 % — SIGNIFICANT CHANGE UP (ref 0–2)
BILIRUB SERPL-MCNC: 0.5 MG/DL — SIGNIFICANT CHANGE UP (ref 0.2–1.2)
BUN SERPL-MCNC: 28 MG/DL — HIGH (ref 7–23)
CALCIUM SERPL-MCNC: 9.9 MG/DL — SIGNIFICANT CHANGE UP (ref 8.4–10.5)
CHLORIDE SERPL-SCNC: 101 MMOL/L — SIGNIFICANT CHANGE UP (ref 96–108)
CO2 SERPL-SCNC: 23 MMOL/L — SIGNIFICANT CHANGE UP (ref 22–31)
CREAT SERPL-MCNC: 1.4 MG/DL — HIGH (ref 0.5–1.3)
CRP SERPL-MCNC: 0.87 MG/DL — HIGH (ref 0–0.4)
EOSINOPHIL # BLD AUTO: 0.37 K/UL — SIGNIFICANT CHANGE UP (ref 0–0.5)
EOSINOPHIL NFR BLD AUTO: 4.2 % — SIGNIFICANT CHANGE UP (ref 0–6)
ERYTHROCYTE [SEDIMENTATION RATE] IN BLOOD: 92 MM/HR — HIGH (ref 0–20)
GLUCOSE SERPL-MCNC: 142 MG/DL — HIGH (ref 70–99)
HCT VFR BLD CALC: 38.9 % — LOW (ref 39–50)
HGB BLD-MCNC: 12.6 G/DL — LOW (ref 13–17)
IMM GRANULOCYTES NFR BLD AUTO: 0.2 % — SIGNIFICANT CHANGE UP (ref 0–1.5)
LYMPHOCYTES # BLD AUTO: 1.57 K/UL — SIGNIFICANT CHANGE UP (ref 1–3.3)
LYMPHOCYTES # BLD AUTO: 17.7 % — SIGNIFICANT CHANGE UP (ref 13–44)
MCHC RBC-ENTMCNC: 29.4 PG — SIGNIFICANT CHANGE UP (ref 27–34)
MCHC RBC-ENTMCNC: 32.4 GM/DL — SIGNIFICANT CHANGE UP (ref 32–36)
MCV RBC AUTO: 90.7 FL — SIGNIFICANT CHANGE UP (ref 80–100)
MONOCYTES # BLD AUTO: 0.79 K/UL — SIGNIFICANT CHANGE UP (ref 0–0.9)
MONOCYTES NFR BLD AUTO: 8.9 % — SIGNIFICANT CHANGE UP (ref 2–14)
NEUTROPHILS # BLD AUTO: 6.11 K/UL — SIGNIFICANT CHANGE UP (ref 1.8–7.4)
NEUTROPHILS NFR BLD AUTO: 68.7 % — SIGNIFICANT CHANGE UP (ref 43–77)
NRBC # BLD: 0 /100 WBCS — SIGNIFICANT CHANGE UP (ref 0–0)
PLATELET # BLD AUTO: 209 K/UL — SIGNIFICANT CHANGE UP (ref 150–400)
POTASSIUM SERPL-MCNC: 5.6 MMOL/L — HIGH (ref 3.5–5.3)
POTASSIUM SERPL-SCNC: 5.6 MMOL/L — HIGH (ref 3.5–5.3)
PROT SERPL-MCNC: 7.3 G/DL — SIGNIFICANT CHANGE UP (ref 6–8.3)
RBC # BLD: 4.29 M/UL — SIGNIFICANT CHANGE UP (ref 4.2–5.8)
RBC # FLD: 13.2 % — SIGNIFICANT CHANGE UP (ref 10.3–14.5)
SARS-COV-2 RNA SPEC QL NAA+PROBE: SIGNIFICANT CHANGE UP
SODIUM SERPL-SCNC: 136 MMOL/L — SIGNIFICANT CHANGE UP (ref 135–145)
WBC # BLD: 8.89 K/UL — SIGNIFICANT CHANGE UP (ref 3.8–10.5)
WBC # FLD AUTO: 8.89 K/UL — SIGNIFICANT CHANGE UP (ref 3.8–10.5)

## 2020-06-13 PROCEDURE — 99284 EMERGENCY DEPT VISIT MOD MDM: CPT | Mod: 25

## 2020-06-13 PROCEDURE — 80053 COMPREHEN METABOLIC PANEL: CPT

## 2020-06-13 PROCEDURE — 87040 BLOOD CULTURE FOR BACTERIA: CPT

## 2020-06-13 PROCEDURE — 73130 X-RAY EXAM OF HAND: CPT | Mod: 26,LT

## 2020-06-13 PROCEDURE — 99284 EMERGENCY DEPT VISIT MOD MDM: CPT

## 2020-06-13 PROCEDURE — 85027 COMPLETE CBC AUTOMATED: CPT

## 2020-06-13 PROCEDURE — 86140 C-REACTIVE PROTEIN: CPT

## 2020-06-13 PROCEDURE — 96374 THER/PROPH/DIAG INJ IV PUSH: CPT

## 2020-06-13 PROCEDURE — 85652 RBC SED RATE AUTOMATED: CPT

## 2020-06-13 PROCEDURE — 73130 X-RAY EXAM OF HAND: CPT

## 2020-06-13 RX ADMIN — Medication 100 MILLIGRAM(S): at 17:03

## 2020-06-13 NOTE — ED PROVIDER NOTE - SHIFT CHANGE DETAILS
Sheron Cano MD - Attending Physician: Pt here with hand pain. Work-up to eval for tenosynovitis. Dispo pending labs/xr/hand consult

## 2020-06-13 NOTE — ED ADULT NURSE NOTE - OBJECTIVE STATEMENT
64M comes to ED c/o left hand swelling. States his left hand began swelling x1 month ago. States 2 week ago, he had an injection into his knuckle joint by an orthopedist. States since his injection, he has had redness to the third joint with swelling. He states he has trouble moving his left third finger. He has PMH HTN DM. A&Ox3 in no distress. Left hand is swollen, feels warm to touch at third finger joint, swelling to top of hand extending to left wrist, redness to left third knuckle. Denies fever/chills/N/V/D. No other joint swelling noted. Will continue to monitor.

## 2020-06-13 NOTE — ED PROVIDER NOTE - ATTENDING CONTRIBUTION TO CARE
pt is a 63 y/o male with h/o dm presents with worsening l hand 3rd digit swelling, tenderness, concern for tenosynovitis, injection with steroids last week by ortho for ca deposits on 3rd mcp, films ordered, labs, iv abx, hand consult ordered. possible cdu for obs vs OR wash out.

## 2020-06-13 NOTE — ED PROVIDER NOTE - CARE PROVIDER_API CALL
Silvana Sy  [facility]  [address] [place] [address]  Phone: [phone]  Fax: [phone]  Follow Up Time:

## 2020-06-13 NOTE — ED PROVIDER NOTE - CHIEF COMPLAINT
The patient is a 64y Male complaining of The patient is a 64y Male complaining of left hand swelling.

## 2020-06-13 NOTE — ED ADULT NURSE NOTE - NSIMPLEMENTINTERV_GEN_ALL_ED
Implemented All Universal Safety Interventions:  [place] to call system. Call bell, personal items and telephone within reach. Instruct patient to call for assistance. Room bathroom lighting operational. Non-slip footwear when patient is off stretcher. Physically safe environment: no spills, clutter or unnecessary equipment. Stretcher in lowest position, wheels locked, appropriate side rails in place.

## 2020-06-13 NOTE — ED PROVIDER NOTE - PROGRESS NOTE DETAILS
Hand consulted, recc non surgical treatment. Pts orthopedic surgeon consulted as well- he recc splint with outpatient follow up, aleeve BID, no antibiotics, and outpatient MRI of carpal head as well.   Aseptic/sterile inflamation most lilkey DDx. Hand splinted patient. PT seen and reassessed.  Patient symptomatically improved.   AAOX3, NAD, VSS.  Discussed test results w/ patient. Patient verbalized understanding of hospital course and outpatient plans, has decisional making capacity.  Will follow-up with Primary care doctor in the next 5-7 days; patient will call for an appointment. Will return to the ED if there is any worsening of symptoms or development of new symptoms.  Patient able to ambulate w/o difficulty, is tolerating PO intake

## 2020-06-13 NOTE — ED PROVIDER NOTE - PATIENT PORTAL LINK FT
You can access the FollowMyHealth Patient Portal offered by [facility] by registering at the following website: [email or web address]. By joining [employer]’s FollowMyHealth portal, you will also be able to view your health information using other applications (apps) compatible with our system.

## 2020-06-13 NOTE — ED PROVIDER NOTE - PHYSICAL EXAMINATION
Physical Exam:    I have reviewed the triage vital signs.    Gen: NAD, AOx3, non-toxic appearing, able to ambulate without assistance  Head and Neck: NCAT, Neck supple without meningismus   HEENT: EOMI, PEERLA, normal conjunctiva, tongue midline, oral mucosa moist  Lung: CTAB, no respiratory distress, no wheezes/rhonchi/rales B/L, speaking in full sentences  CV: RRR, no murmurs, rubs or gallops  Abd: soft, NT, ND, no guarding, no rigidity, no rebound tenderness, no CVA tenderness, no masses.   MSK: no gross deformities, ROM normal in UE/LE, no back tenderness. Left hand: pitting edema to 3rd MCP joint tracking upward toward forearm. Erythema dorsal hand, streaking up into left forearm. 3rd digit held in flexion. Patient cannot range 3rd digit due to severe pain.   Neuro: CNs II-XII grossly intact. No focal sensory or motor deficits  Skin: Warm, well perfused, no rash, no leg swelling  Psych: Appropriate mood and affect

## 2020-06-13 NOTE — ED PROVIDER NOTE - OBJECTIVE STATEMENT
64M pmh DM 64M pmh DM on Lantus, presents with 4 weeks edema, redness pain to left hand. Worsening acutely over last week. Last week got steroid injection by orthopedic surgeon outpatient. Patient denies trauma to area. denies fever/chills.

## 2020-06-14 NOTE — ED POST DISCHARGE NOTE - ADDITIONAL DOCUMENTATION
6-16: in regards to xray. Spoke to pt wife. Shyann pt has f/up w/ ortho hand today. Advised discuss results of xray at appt. Fax of results sent to Dr. Forbes office. - Delisa Lehman PA-C

## 2020-06-14 NOTE — ED POST DISCHARGE NOTE - RESULT SUMMARY
ESR 92 ESR 92, xray: diffuse soft tissue swelling w/out underlying fx or dislocation. lucencies w/in 3rd metacarpal head that may be related to erosions. Osteo cant be excluded

## 2020-06-14 NOTE — ED POST DISCHARGE NOTE - DETAILS
6/14/2020: left VM for patient to call back. -Maida Garcia PA-C informed patient of result, states he has follow-up with his orthopedist and understands and agrees with plan to have test repeated. -Maida Garcia PA-C

## 2020-06-16 ENCOUNTER — APPOINTMENT (OUTPATIENT)
Dept: ORTHOPEDIC SURGERY | Facility: CLINIC | Age: 65
End: 2020-06-16
Payer: COMMERCIAL

## 2020-06-16 VITALS — TEMPERATURE: 97.9 F

## 2020-06-16 DIAGNOSIS — M86.9 OSTEOMYELITIS, UNSPECIFIED: ICD-10-CM

## 2020-06-16 PROCEDURE — 99214 OFFICE O/P EST MOD 30 MIN: CPT

## 2020-06-17 ENCOUNTER — RESULT REVIEW (OUTPATIENT)
Age: 65
End: 2020-06-17

## 2020-06-17 ENCOUNTER — APPOINTMENT (OUTPATIENT)
Dept: INFECTIOUS DISEASE | Facility: CLINIC | Age: 65
End: 2020-06-17

## 2020-06-17 ENCOUNTER — OUTPATIENT (OUTPATIENT)
Dept: OUTPATIENT SERVICES | Facility: HOSPITAL | Age: 65
LOS: 1 days | End: 2020-06-17
Payer: COMMERCIAL

## 2020-06-17 ENCOUNTER — APPOINTMENT (OUTPATIENT)
Dept: MRI IMAGING | Facility: CLINIC | Age: 65
End: 2020-06-17
Payer: COMMERCIAL

## 2020-06-17 DIAGNOSIS — M86.9 OSTEOMYELITIS, UNSPECIFIED: ICD-10-CM

## 2020-06-17 PROCEDURE — 73220 MRI UPPR EXTREMITY W/O&W/DYE: CPT | Mod: 26,LT

## 2020-06-17 PROCEDURE — A9585: CPT

## 2020-06-17 PROCEDURE — 73220 MRI UPPR EXTREMITY W/O&W/DYE: CPT

## 2020-06-19 ENCOUNTER — APPOINTMENT (OUTPATIENT)
Dept: INTERNAL MEDICINE | Facility: CLINIC | Age: 65
End: 2020-06-19

## 2020-06-19 LAB
CULTURE RESULTS: SIGNIFICANT CHANGE UP
CULTURE RESULTS: SIGNIFICANT CHANGE UP
SPECIMEN SOURCE: SIGNIFICANT CHANGE UP
SPECIMEN SOURCE: SIGNIFICANT CHANGE UP

## 2020-06-22 ENCOUNTER — APPOINTMENT (OUTPATIENT)
Dept: ORTHOPEDIC SURGERY | Facility: CLINIC | Age: 65
End: 2020-06-22
Payer: COMMERCIAL

## 2020-06-22 VITALS — TEMPERATURE: 98.5 F

## 2020-06-22 PROCEDURE — 99214 OFFICE O/P EST MOD 30 MIN: CPT

## 2020-06-29 ENCOUNTER — RX RENEWAL (OUTPATIENT)
Age: 65
End: 2020-06-29

## 2020-07-06 ENCOUNTER — RX RENEWAL (OUTPATIENT)
Age: 65
End: 2020-07-06

## 2020-07-14 ENCOUNTER — APPOINTMENT (OUTPATIENT)
Dept: ORTHOPEDIC SURGERY | Facility: CLINIC | Age: 65
End: 2020-07-14
Payer: COMMERCIAL

## 2020-07-14 VITALS — TEMPERATURE: 98.6 F

## 2020-07-14 PROCEDURE — 99214 OFFICE O/P EST MOD 30 MIN: CPT

## 2020-07-16 ENCOUNTER — APPOINTMENT (OUTPATIENT)
Dept: ORTHOPEDIC SURGERY | Facility: CLINIC | Age: 65
End: 2020-07-16

## 2020-08-04 ENCOUNTER — APPOINTMENT (OUTPATIENT)
Dept: ORTHOPEDIC SURGERY | Facility: CLINIC | Age: 65
End: 2020-08-04
Payer: COMMERCIAL

## 2020-08-04 PROCEDURE — 73130 X-RAY EXAM OF HAND: CPT | Mod: LT

## 2020-08-04 PROCEDURE — 99214 OFFICE O/P EST MOD 30 MIN: CPT

## 2020-08-09 DIAGNOSIS — Z01.818 ENCOUNTER FOR OTHER PREPROCEDURAL EXAMINATION: ICD-10-CM

## 2020-08-11 ENCOUNTER — APPOINTMENT (OUTPATIENT)
Dept: DISASTER EMERGENCY | Facility: CLINIC | Age: 65
End: 2020-08-11

## 2020-08-12 ENCOUNTER — OUTPATIENT (OUTPATIENT)
Dept: OUTPATIENT SERVICES | Facility: HOSPITAL | Age: 65
LOS: 1 days | End: 2020-08-12
Payer: COMMERCIAL

## 2020-08-12 VITALS
WEIGHT: 242.07 LBS | HEART RATE: 84 BPM | SYSTOLIC BLOOD PRESSURE: 146 MMHG | DIASTOLIC BLOOD PRESSURE: 84 MMHG | RESPIRATION RATE: 16 BRPM | TEMPERATURE: 98 F | HEIGHT: 70 IN | OXYGEN SATURATION: 99 %

## 2020-08-12 DIAGNOSIS — M86.9 OSTEOMYELITIS, UNSPECIFIED: ICD-10-CM

## 2020-08-12 DIAGNOSIS — Z90.5 ACQUIRED ABSENCE OF KIDNEY: Chronic | ICD-10-CM

## 2020-08-12 DIAGNOSIS — Z01.818 ENCOUNTER FOR OTHER PREPROCEDURAL EXAMINATION: ICD-10-CM

## 2020-08-12 DIAGNOSIS — E11.9 TYPE 2 DIABETES MELLITUS WITHOUT COMPLICATIONS: ICD-10-CM

## 2020-08-12 LAB
A1C WITH ESTIMATED AVERAGE GLUCOSE RESULT: 8 % — HIGH (ref 4–5.6)
ANION GAP SERPL CALC-SCNC: 16 MMOL/L — SIGNIFICANT CHANGE UP (ref 5–17)
BUN SERPL-MCNC: 19 MG/DL — SIGNIFICANT CHANGE UP (ref 7–23)
CALCIUM SERPL-MCNC: 10.6 MG/DL — HIGH (ref 8.4–10.5)
CHLORIDE SERPL-SCNC: 96 MMOL/L — SIGNIFICANT CHANGE UP (ref 96–108)
CO2 SERPL-SCNC: 25 MMOL/L — SIGNIFICANT CHANGE UP (ref 22–31)
CREAT SERPL-MCNC: 1.05 MG/DL — SIGNIFICANT CHANGE UP (ref 0.5–1.3)
ESTIMATED AVERAGE GLUCOSE: 183 MG/DL — HIGH (ref 68–114)
GLUCOSE SERPL-MCNC: 222 MG/DL — HIGH (ref 70–99)
HCT VFR BLD CALC: 40.7 % — SIGNIFICANT CHANGE UP (ref 39–50)
HGB BLD-MCNC: 13.4 G/DL — SIGNIFICANT CHANGE UP (ref 13–17)
MCHC RBC-ENTMCNC: 29.3 PG — SIGNIFICANT CHANGE UP (ref 27–34)
MCHC RBC-ENTMCNC: 32.9 GM/DL — SIGNIFICANT CHANGE UP (ref 32–36)
MCV RBC AUTO: 89.1 FL — SIGNIFICANT CHANGE UP (ref 80–100)
NRBC # BLD: 0 /100 WBCS — SIGNIFICANT CHANGE UP (ref 0–0)
PLATELET # BLD AUTO: 223 K/UL — SIGNIFICANT CHANGE UP (ref 150–400)
POTASSIUM SERPL-MCNC: 5 MMOL/L — SIGNIFICANT CHANGE UP (ref 3.5–5.3)
POTASSIUM SERPL-SCNC: 5 MMOL/L — SIGNIFICANT CHANGE UP (ref 3.5–5.3)
RBC # BLD: 4.57 M/UL — SIGNIFICANT CHANGE UP (ref 4.2–5.8)
RBC # FLD: 13.9 % — SIGNIFICANT CHANGE UP (ref 10.3–14.5)
SARS-COV-2 N GENE NPH QL NAA+PROBE: NOT DETECTED
SODIUM SERPL-SCNC: 137 MMOL/L — SIGNIFICANT CHANGE UP (ref 135–145)
WBC # BLD: 6.99 K/UL — SIGNIFICANT CHANGE UP (ref 3.8–10.5)
WBC # FLD AUTO: 6.99 K/UL — SIGNIFICANT CHANGE UP (ref 3.8–10.5)

## 2020-08-12 PROCEDURE — 85027 COMPLETE CBC AUTOMATED: CPT

## 2020-08-12 PROCEDURE — 80048 BASIC METABOLIC PNL TOTAL CA: CPT

## 2020-08-12 PROCEDURE — 83036 HEMOGLOBIN GLYCOSYLATED A1C: CPT

## 2020-08-12 PROCEDURE — G0463: CPT

## 2020-08-12 RX ORDER — CEFAZOLIN SODIUM 1 G
2000 VIAL (EA) INJECTION ONCE
Refills: 0 | Status: DISCONTINUED | OUTPATIENT
Start: 2020-08-14 | End: 2020-08-29

## 2020-08-12 RX ORDER — CHLORHEXIDINE GLUCONATE 213 G/1000ML
1 SOLUTION TOPICAL ONCE
Refills: 0 | Status: COMPLETED | OUTPATIENT
Start: 2020-08-14 | End: 2020-08-14

## 2020-08-12 RX ORDER — ENOXAPARIN SODIUM 100 MG/ML
35 INJECTION SUBCUTANEOUS
Qty: 0 | Refills: 0 | DISCHARGE

## 2020-08-12 RX ORDER — SODIUM CHLORIDE 9 MG/ML
3 INJECTION INTRAMUSCULAR; INTRAVENOUS; SUBCUTANEOUS EVERY 8 HOURS
Refills: 0 | Status: DISCONTINUED | OUTPATIENT
Start: 2020-08-14 | End: 2020-08-29

## 2020-08-12 RX ORDER — LIDOCAINE HCL 20 MG/ML
0.2 VIAL (ML) INJECTION ONCE
Refills: 0 | Status: DISCONTINUED | OUTPATIENT
Start: 2020-08-14 | End: 2020-08-29

## 2020-08-12 NOTE — H&P PST ADULT - NSICDXPROBLEM_GEN_ALL_CORE_FT
PROBLEM DIAGNOSES  Problem: DM (diabetes mellitus)  Assessment and Plan: Will follow up with labs/ fingerstick.    HgA1c ordered   Instructed to hold metformin, last dose 08/13 am  STAT FS  on the day of surgery ordered     Problem: Osteomyelitis  Assessment and Plan: left 3rd finger debridement & capsulotomy with culture  Pre op covid testing, done 08/11/20, negative PROBLEM DIAGNOSES  Problem: DM (diabetes mellitus)  Assessment and Plan: Will follow up with labs/ fingerstick.    HgA1c ordered   Instructed to hold metformin, last dose 08/13 am,  & to hold metformin & glipezide on DOS  STAT FS  on the day of surgery ordered   Lantus to 25 units day before surgery.    Problem: Osteomyelitis  Assessment and Plan: left 3rd finger debridement & capsulotomy with culture  Pre op covid testing, done 08/11/20, negative

## 2020-08-12 NOTE — H&P PST ADULT - NSICDXFAMILYHX_GEN_ALL_CORE_FT
FAMILY HISTORY:  Father  Still living? Unknown  Family history of diabetes mellitus, Age at diagnosis: Age Unknown    Mother  Still living? Yes, Estimated age: Age Unknown  Family history of hypertension, Age at diagnosis: Age Unknown

## 2020-08-12 NOTE — H&P PST ADULT - ABILITY TO HEAR (WITH HEARING AID OR HEARING APPLIANCE IF NORMALLY USED):
Mildly to Moderately Impaired: difficulty hearing in some environments or speaker may need to increase volume or speak distinctly/mild hearing loss in right ear

## 2020-08-12 NOTE — H&P PST ADULT - HISTORY OF PRESENT ILLNESS
65 yearo male presents for preop eval  s/p left partial nephrectomy on 5/3/17.  Pt states incidental finding on imaging for elevated liver enzymes few months ago.  Preop dx Malignant neoplasm of kidney. 65 yearo male presents for preop eval  s/p left partial nephrectomy on 5/3/17.  who presents with left 3rd MCP pain, with concern for infection. 65 year old male presents for preop eval  /p left partial nephrectomy on 05/3/17, now  presents with recurrence of what appears to be an infection vs. calcific tendonitis at the third MCP joint. He originally saw an orthopedic MD who gave him a steroid injection into the MCP joint for calcific tendonitis & the patient experienced increased swelling for 24 hours. He has had continued pain and increased swelling since the injection, and went to [facility] on 6/14/2020 for evaluation & s/p treated with IV clindamycin x1 & a full corse of bactrim, scheduled for left 3rd finger debridement and capsulotomy with culture on 08/14/20.  preop Covid testing done 08/11/20 , negative.. 65 year old male s/p left partial nephrectomy for Rsx malignant tumor on 05/3/17, now  presents with recurrence of what appears to be an infection vs calcific tendonitis at the third MCP joint in left third finger. He originally saw an orthopedic MD who gave him a steroid injection into the MCP joint for calcific tendonitis & the patient experienced increased swelling for 24 hours. He has had continued pain and increased swelling since the injection, and went to [facility] on 6/14/2020 for evaluation & s/p treated with IV clindamycin x1 & a full course  of bactrim. Scheduled for left 3rd finger debridement and capsulotomy with culture on 08/14/20.    ****Preop Covid testing done 08/11/20 , negative. 65 year old male s/p left partial nephrectomy for Rsx malignant tumor on 05/3/17, now  presents with recurrence of what appears to be an infection vs calcific tendonitis at the third MCP joint in left third finger. He originally saw an orthopedic MD who gave him a steroid injection into the MCP joint for calcific tendonitis & the patient experienced increased swelling for 24 hours. He has had continued pain and increased swelling since the injection, and went to [facility] on 6/14/2020 for evaluation & s/p treated with IV clindamycin x1 & a full course  of bactrim. Scheduled for left 3rd finger debridement and capsulotomy with culture on 08/14/20.    ****Preop Covid testing done 08/11/20 , negative.    +++Emailed Dr Forbes and she is ok to proceed with surgery with HgBA1c 8.0, Endo and PMD notes are in chart.

## 2020-08-12 NOTE — H&P PST ADULT - REASON FOR ADMISSION
infection in my left 3 rd finger "Having surgery under local anesthesia for an infection in my left 3 rd finger "

## 2020-08-12 NOTE — H&P PST ADULT - NSICDXPASTMEDICALHX_GEN_ALL_CORE_FT
PAST MEDICAL HISTORY:  Diabetes mellitus type II    Dyslipidemia     Hypertension     Malignant neoplasm of unspecified kidney, except renal pelvis Malignnt tumor bernardo kiney  & had partial nephrectomy 2017    Obesity PAST MEDICAL HISTORY:  Diabetes mellitus type II    Dyslipidemia     Hypertension     Malignant neoplasm of unspecified kidney, except renal pelvis Malignant tumor bernardo kiney  & had partial nephrectomy 2017    Obesity

## 2020-08-13 ENCOUNTER — NON-APPOINTMENT (OUTPATIENT)
Age: 65
End: 2020-08-13

## 2020-08-13 ENCOUNTER — APPOINTMENT (OUTPATIENT)
Dept: INTERNAL MEDICINE | Facility: CLINIC | Age: 65
End: 2020-08-13
Payer: COMMERCIAL

## 2020-08-13 VITALS
WEIGHT: 242 LBS | OXYGEN SATURATION: 97 % | DIASTOLIC BLOOD PRESSURE: 99 MMHG | HEIGHT: 70 IN | BODY MASS INDEX: 34.65 KG/M2 | RESPIRATION RATE: 17 BRPM | HEART RATE: 85 BPM | SYSTOLIC BLOOD PRESSURE: 156 MMHG | TEMPERATURE: 98.1 F

## 2020-08-13 PROBLEM — C64.9 MALIGNANT NEOPLASM OF UNSPECIFIED KIDNEY, EXCEPT RENAL PELVIS: Chronic | Status: ACTIVE | Noted: 2017-04-24

## 2020-08-13 PROCEDURE — 99214 OFFICE O/P EST MOD 30 MIN: CPT | Mod: 25

## 2020-08-13 PROCEDURE — 93000 ELECTROCARDIOGRAM COMPLETE: CPT

## 2020-08-13 NOTE — ASSESSMENT
[Patient Optimized for Surgery] : Patient optimized for surgery [ECG] : ECG [As per surgery] : as per surgery [Modify medications prior to procedure] : Modify medications prior to procedure [FreeTextEntry7] : patient advised not to take metformin, glipizide day of surgery

## 2020-08-13 NOTE — ASU DISCHARGE PLAN (ADULT/PEDIATRIC) - NURSING INSTRUCTIONS
Tylenol/acetaminophen------------------AND/OR---------------------Motrin/ibuprofen  as needed for pain/discomfort.  NEXT DOSE:  Tylenol OK @ 9:15pm this evening, if needed.  Please read and follow preprinted, MD-specific instructions provided, (purple pamphlet).  Follow up with MD as requested; call office for appointment.

## 2020-08-13 NOTE — ASU DISCHARGE PLAN (ADULT/PEDIATRIC) - CALL YOUR DOCTOR IF YOU HAVE ANY OF THE FOLLOWING:
Bleeding that does not stop/Swelling that gets worse/Fever greater than (need to indicate Fahrenheit or Celsius)/Pain not relieved by Medications/Numbness, tingling, color or temperature change to extremity/Wound/Surgical Site with redness, or foul smelling discharge or pus

## 2020-08-13 NOTE — PHYSICAL EXAM
[No Acute Distress] : no acute distress [Well Nourished] : well nourished [Well Developed] : well developed [Well-Appearing] : well-appearing [Normal Sclera/Conjunctiva] : normal sclera/conjunctiva [Normal Outer Ear/Nose] : the outer ears and nose were normal in appearance [EOMI] : extraocular movements intact [PERRL] : pupils equal round and reactive to light [Normal Oropharynx] : the oropharynx was normal [No JVD] : no jugular venous distention [Supple] : supple [No Lymphadenopathy] : no lymphadenopathy [No Respiratory Distress] : no respiratory distress  [Thyroid Normal, No Nodules] : the thyroid was normal and there were no nodules present [Normal Rate] : normal rate  [Clear to Auscultation] : lungs were clear to auscultation bilaterally [No Accessory Muscle Use] : no accessory muscle use [Normal S1, S2] : normal S1 and S2 [Regular Rhythm] : with a regular rhythm [No Carotid Bruits] : no carotid bruits [No Murmur] : no murmur heard [No Abdominal Bruit] : a ~M bruit was not heard ~T in the abdomen [Pedal Pulses Present] : the pedal pulses are present [No Varicosities] : no varicosities [No Palpable Aorta] : no palpable aorta [No Edema] : there was no peripheral edema [No Extremity Clubbing/Cyanosis] : no extremity clubbing/cyanosis [Soft] : abdomen soft [Non-distended] : non-distended [Non Tender] : non-tender [No Masses] : no abdominal mass palpated [No HSM] : no HSM [Normal Posterior Cervical Nodes] : no posterior cervical lymphadenopathy [Normal Bowel Sounds] : normal bowel sounds [Normal Anterior Cervical Nodes] : no anterior cervical lymphadenopathy [No CVA Tenderness] : no CVA  tenderness [No Spinal Tenderness] : no spinal tenderness [No Joint Swelling] : no joint swelling [Grossly Normal Strength/Tone] : grossly normal strength/tone [Coordination Grossly Intact] : coordination grossly intact [No Rash] : no rash [No Focal Deficits] : no focal deficits [Normal Gait] : normal gait [Normal Insight/Judgement] : insight and judgment were intact [Normal Affect] : the affect was normal

## 2020-08-13 NOTE — PLAN
[FreeTextEntry1] : 1.pre operative examination\par * patient is medically stable for planned surgical procedure\par 2. hypertension\par * instructed to take lisinopril and amlodipine with sip of water day of surgery\par 3. type 2 diabetes\par * instructed to stop Metformin and Glipizide day of surgery\par schedule follow up in one month

## 2020-08-13 NOTE — HISTORY OF PRESENT ILLNESS
[No Pertinent Cardiac History] : no history of aortic stenosis, atrial fibrillation, coronary artery disease, recent myocardial infarction, or implantable device/pacemaker [No Pertinent Pulmonary History] : no history of asthma, COPD, sleep apnea, or smoking [No Adverse Anesthesia Reaction] : no adverse anesthesia reaction in self or family member [(Patient denies any chest pain, claudication, dyspnea on exertion, orthopnea, palpitations or syncope)] : Patient denies any chest pain, claudication, dyspnea on exertion, orthopnea, palpitations or syncope [Diabetes] : diabetes [Chronic Anticoagulation] : no chronic anticoagulation [Chronic Kidney Disease] : no chronic kidney disease [FreeTextEntry1] : left hand debridement [FreeTextEntry2] : 08/14/2020 [FreeTextEntry3] : DR AGUILAR [FreeTextEntry4] : 65 years old male with type 2 diabetes, here for medical clearance consultation requested by DR AGUILAR for capsulotomy and debridement of third MCP  left hand; patient offers no acute complains other than mild pain left hand third finger

## 2020-08-14 ENCOUNTER — APPOINTMENT (OUTPATIENT)
Dept: ORTHOPEDIC SURGERY | Facility: HOSPITAL | Age: 65
End: 2020-08-14

## 2020-08-14 ENCOUNTER — OUTPATIENT (OUTPATIENT)
Dept: OUTPATIENT SERVICES | Facility: HOSPITAL | Age: 65
LOS: 1 days | End: 2020-08-14
Payer: COMMERCIAL

## 2020-08-14 VITALS
HEIGHT: 70 IN | TEMPERATURE: 97 F | DIASTOLIC BLOOD PRESSURE: 88 MMHG | HEART RATE: 85 BPM | WEIGHT: 242.07 LBS | RESPIRATION RATE: 16 BRPM | OXYGEN SATURATION: 98 % | SYSTOLIC BLOOD PRESSURE: 150 MMHG

## 2020-08-14 VITALS
SYSTOLIC BLOOD PRESSURE: 147 MMHG | OXYGEN SATURATION: 96 % | DIASTOLIC BLOOD PRESSURE: 71 MMHG | RESPIRATION RATE: 16 BRPM | TEMPERATURE: 97 F

## 2020-08-14 DIAGNOSIS — Z90.5 ACQUIRED ABSENCE OF KIDNEY: Chronic | ICD-10-CM

## 2020-08-14 DIAGNOSIS — M86.9 OSTEOMYELITIS, UNSPECIFIED: ICD-10-CM

## 2020-08-14 LAB — GLUCOSE BLDC GLUCOMTR-MCNC: 131 MG/DL — HIGH (ref 70–99)

## 2020-08-14 PROCEDURE — 87075 CULTR BACTERIA EXCEPT BLOOD: CPT

## 2020-08-14 PROCEDURE — 82962 GLUCOSE BLOOD TEST: CPT

## 2020-08-14 PROCEDURE — 26105 BIOPSY FINGER JOINT LINING: CPT | Mod: F2

## 2020-08-14 PROCEDURE — 26520 RELEASE KNUCKLE CONTRACTURE: CPT | Mod: F2

## 2020-08-14 PROCEDURE — 87186 SC STD MICRODIL/AGAR DIL: CPT

## 2020-08-14 PROCEDURE — 87070 CULTURE OTHR SPECIMN AEROBIC: CPT

## 2020-08-14 RX ORDER — SODIUM CHLORIDE 9 MG/ML
1000 INJECTION, SOLUTION INTRAVENOUS
Refills: 0 | Status: DISCONTINUED | OUTPATIENT
Start: 2020-08-14 | End: 2020-08-29

## 2020-08-14 RX ORDER — AZTREONAM 2 G
1 VIAL (EA) INJECTION
Qty: 28 | Refills: 0
Start: 2020-08-14 | End: 2020-08-27

## 2020-08-14 RX ORDER — KETOROLAC TROMETHAMINE 30 MG/ML
30 SYRINGE (ML) INJECTION ONCE
Refills: 0 | Status: DISCONTINUED | OUTPATIENT
Start: 2020-08-14 | End: 2020-08-14

## 2020-08-14 RX ADMIN — CHLORHEXIDINE GLUCONATE 1 APPLICATION(S): 213 SOLUTION TOPICAL at 14:02

## 2020-08-14 NOTE — ASU PATIENT PROFILE, ADULT - PMH
Diabetes mellitus  type II  Dyslipidemia    Hypertension    Malignant neoplasm of unspecified kidney, except renal pelvis  Malignnt tumor bernardo kiney  & had partial nephrectomy 2017  Obesity

## 2020-08-14 NOTE — ASU PREOP CHECKLIST - SPO2 (%)
98 Muscle Hinge Flap Text: The defect edges were debeveled with a #15 scalpel blade.  Given the size, depth and location of the defect and the proximity to free margins a muscle hinge flap was deemed most appropriate.  Using a sterile surgical marker, an appropriate hinge flap was drawn incorporating the defect. The area thus outlined was incised with a #15 scalpel blade.  The skin margins were undermined to an appropriate distance in all directions utilizing iris scissors.

## 2020-08-14 NOTE — PRE-ANESTHESIA EVALUATION ADULT - NSANTHTIREDRD_ENT_A_CORE
2y1m old  Female who presents with AMKL (acute megakaryoblastic leukemia) admitted with fever (likely leukemic fever), inability to bear weight, and dehydration blood culture so far negative to start induction chemotherapy at [facility] [place] [place] on Friday 1/12. Patient is doing well but nearly persistently febrile likely secondary to leukemic fevers.  Vancomycin has been theraputic since 1/14. Amikacin level is low.      ONC:     - [id number] induction day 5 (1/16)    - allopurinol TID    - cardio cleared for chemo 1/10    - transfusion crit 8/10    - high risk CLABSI bundle    FENGI:     - Pediasure 30 cc/h via NGT, increase by 5 cc/h q6h to goal 40 cc/h    - D5 + NS @ 1.5 x mntce as TLS prophylaxis    - daily miralax for constipation    - famotidine    - zofran and vistaril ATC for nausea    - TLL q12h    ID:    - ashley & amikacin (1/15 - )    - Vanco 20 mg/kg IV q6h (redosed 1/14; trough 12.2)    - most recent blood culture 6am on 1/16    -Adjust Amikacin level until therapeutic    NEURO/PAIN:    - dilaudid 0.11mg IV q4h PRN    ACCESS:    - broviac 1/12- No 2y1m old  Female who presents with AMKL (acute megakaryoblastic leukemia) admitted with fever (likely leukemic fever), inability to bear weight, and dehydration blood culture so far negative to start induction chemotherapy at [facility] [place] 1/12. Patient is doing well but nearly persistently febrile likely secondary to leukemic fevers.  Vancomycin has been theraputic since 1/14. Amikacin level is low.      ONC:     - [id number] induction day 5 (1/16)    - HR CLABSI bundle    - s/p allopurinol TID    - cardio cleared for chemo 1/10    - transfusion crit 8/10    - TLL q day    FENGI:     - Pediasure 30 cc/h via NG, increase by 5 cc/h q6h to goal 40 cc/h    - D5 + NS @ 1.5 x mntce    - daily miralax for constipation    - famotidine    - zofran and vistaril ATC nausea    ID:    - ashley & amikacin (1/15 - ) - d/c amikacin once blood culture 1/15 neg x48h    - Vanco 20 mg/kg IV q6h (redosed 1/14; trough 12.2)    - Voriconazole (1/16 - )  	*will need recovered counts and CT prior to dc    - Bactrim F/S/S    - acyclovir    - mouth care    - s/p cefepime    NEURO/PAIN:    - dilaudid 0.11mg IV q4h PRN    - PT    FEVER    - tylenol ATC x6 days (1/16 - ) while on Serina C    - coverage for bacterial, HSV, and fungal infection due to prior neutropenia    ACCESS:    - broviac 1/12-

## 2020-08-15 LAB
GRAM STN FLD: SIGNIFICANT CHANGE UP
GRAM STN FLD: SIGNIFICANT CHANGE UP
SPECIMEN SOURCE: SIGNIFICANT CHANGE UP
SPECIMEN SOURCE: SIGNIFICANT CHANGE UP

## 2020-08-16 LAB
-  AMPICILLIN/SULBACTAM: SIGNIFICANT CHANGE UP
-  CEFAZOLIN: SIGNIFICANT CHANGE UP
-  CLINDAMYCIN: SIGNIFICANT CHANGE UP
-  ERYTHROMYCIN: SIGNIFICANT CHANGE UP
-  GENTAMICIN: SIGNIFICANT CHANGE UP
-  OXACILLIN: SIGNIFICANT CHANGE UP
-  PENICILLIN: SIGNIFICANT CHANGE UP
-  RIFAMPIN: SIGNIFICANT CHANGE UP
-  TETRACYCLINE: SIGNIFICANT CHANGE UP
-  TRIMETHOPRIM/SULFAMETHOXAZOLE: SIGNIFICANT CHANGE UP
-  VANCOMYCIN: SIGNIFICANT CHANGE UP
METHOD TYPE: SIGNIFICANT CHANGE UP

## 2020-08-19 LAB
CULTURE RESULTS: SIGNIFICANT CHANGE UP
ORGANISM # SPEC MICROSCOPIC CNT: SIGNIFICANT CHANGE UP
SPECIMEN SOURCE: SIGNIFICANT CHANGE UP

## 2020-08-20 ENCOUNTER — RX RENEWAL (OUTPATIENT)
Age: 65
End: 2020-08-20

## 2020-08-21 ENCOUNTER — RX RENEWAL (OUTPATIENT)
Age: 65
End: 2020-08-21

## 2020-08-25 ENCOUNTER — APPOINTMENT (OUTPATIENT)
Dept: ORTHOPEDIC SURGERY | Facility: CLINIC | Age: 65
End: 2020-08-25
Payer: COMMERCIAL

## 2020-08-25 DIAGNOSIS — M86.9 OSTEOMYELITIS, UNSPECIFIED: ICD-10-CM

## 2020-08-25 PROCEDURE — 99024 POSTOP FOLLOW-UP VISIT: CPT

## 2020-08-28 ENCOUNTER — APPOINTMENT (OUTPATIENT)
Dept: ENDOCRINOLOGY | Facility: CLINIC | Age: 65
End: 2020-08-28

## 2020-08-31 ENCOUNTER — RX RENEWAL (OUTPATIENT)
Age: 65
End: 2020-08-31

## 2020-09-17 ENCOUNTER — APPOINTMENT (OUTPATIENT)
Dept: INTERNAL MEDICINE | Facility: CLINIC | Age: 65
End: 2020-09-17

## 2020-09-22 ENCOUNTER — APPOINTMENT (OUTPATIENT)
Dept: ORTHOPEDIC SURGERY | Facility: CLINIC | Age: 65
End: 2020-09-22
Payer: COMMERCIAL

## 2020-09-22 VITALS — TEMPERATURE: 97.9 F

## 2020-09-22 DIAGNOSIS — M65.242 CALCIFIC TENDINITIS, LEFT HAND: ICD-10-CM

## 2020-09-22 PROCEDURE — 73130 X-RAY EXAM OF HAND: CPT | Mod: LT

## 2020-09-22 PROCEDURE — 99024 POSTOP FOLLOW-UP VISIT: CPT

## 2020-10-02 ENCOUNTER — APPOINTMENT (OUTPATIENT)
Dept: INTERNAL MEDICINE | Facility: CLINIC | Age: 65
End: 2020-10-02

## 2020-10-06 ENCOUNTER — APPOINTMENT (OUTPATIENT)
Dept: ENDOCRINOLOGY | Facility: CLINIC | Age: 65
End: 2020-10-06
Payer: COMMERCIAL

## 2020-10-06 VITALS
OXYGEN SATURATION: 98 % | TEMPERATURE: 98.7 F | SYSTOLIC BLOOD PRESSURE: 140 MMHG | DIASTOLIC BLOOD PRESSURE: 84 MMHG | HEIGHT: 70 IN | BODY MASS INDEX: 34.5 KG/M2 | WEIGHT: 241 LBS | HEART RATE: 100 BPM

## 2020-10-06 DIAGNOSIS — Z23 ENCOUNTER FOR IMMUNIZATION: ICD-10-CM

## 2020-10-06 PROCEDURE — 90662 IIV NO PRSV INCREASED AG IM: CPT

## 2020-10-06 PROCEDURE — G0008: CPT

## 2020-10-06 PROCEDURE — 99214 OFFICE O/P EST MOD 30 MIN: CPT | Mod: 25

## 2020-10-06 RX ORDER — SULFAMETHOXAZOLE AND TRIMETHOPRIM 800; 160 MG/1; MG/1
800-160 TABLET ORAL TWICE DAILY
Qty: 56 | Refills: 0 | Status: COMPLETED | COMMUNITY
Start: 2020-08-25 | End: 2020-10-06

## 2020-10-06 RX ORDER — SULFAMETHOXAZOLE AND TRIMETHOPRIM 800; 160 MG/1; MG/1
800-160 TABLET ORAL TWICE DAILY
Qty: 20 | Refills: 0 | Status: COMPLETED | COMMUNITY
Start: 2020-06-16 | End: 2020-10-06

## 2020-10-06 NOTE — HISTORY OF PRESENT ILLNESS
[FreeTextEntry1] : Patient is a -year-old pleasant 65-year-old male here for followup for his type 2 diabetes.  He was diagnosed with type 2 diabetes in 1990s.  His A1c is relatively uncontrolled.  A1C is 7.4% (May 2020). \par \par Patient has no known microvascular complication including CAD, CHF CVA in the past.\par \par Patient had a history of renal cancer, status post resection in April 2017.  He did not require any chemotherapy.  He follows up with .  \par \par Regarding his diabetic regimen, patient currently takes Lantus 40 units at bedtime, he had been increasing it, metformin 1000 mg twice daily, and glipizide 10 mg twice daily.\par \par Presenting hypertension, patient is currently taking lisinopril 30 mg daily, this dose was reduced from 40 mg daily by his nephrologist. He is looking for a new neprhologist as this previously one is no longer covered by his insurance.  \par \par Regarding hyperlipidemia, patient currently takes the atorvastatin 40 mg daily.  Reports no myalgias or myopathy. \par \par There was a lot of birthdays during Nov and now.  He has been eating out a lot. His mother also passed away in Nov 2019.  She was 101.  He reports that he hasn't been adherent with diet.  He had an infection in his toe. This was also in Nov 2019.  He went for MRI last Saturday there was no osteomyelitis.  He was on PO antibiotics and now he was instructed to put topical abx, was asked to put in vinegar as instruction of his podiatrist.  (Dr. Eve Escalante) \par \par About 5 months ago, went out to dinner, he started getting floater, like a spider web; he got the beginning of cataract on the left eye. He is following up with ophthalmologist.  There was mild fluid and blood behind his eye. \par \par He also had an infection on his hand with Staph Aureus; he just had surgery.  He was told he had some calcification on the hand by another doctor, had a injection but thinks that might have introduced the injection.  \par

## 2020-10-06 NOTE — DATA REVIEWED
[FreeTextEntry1] : August 13, 2020\par Sodium 136\par Potassium 5.6\par Chloride 101\par CO2 23\par Anion gap 12\par Glucose 142\par Creatinine 1.4\par EGFR 53\par AST 31\par ALT 15\par Alk phos 92\par Bilirubin total 0.5\par Albumin 4.0\par Protein total 7.3\par Calcium 9.9\par C-reactive protein 0.87\par COVID-19 PCR not detected\par ESR 92\par \par Hemoglobin A1c 8.0, estimated glucose average 183

## 2020-10-06 NOTE — ASSESSMENT
[Diabetes Foot Care] : diabetes foot care [Long Term Vascular Complications] : long term vascular complications of diabetes [Carbohydrate Consistent Diet] : carbohydrate consistent diet [Importance of Diet and Exercise] : importance of diet and exercise to improve glycemic control, achieve weight loss and improve cardiovascular health [Exercise/Effect on Glucose] : exercise/effect on glucose [Hypoglycemia Management] : hypoglycemia management [Glucagon Use] : glucagon use [Ketone Testing] : ketone testing [Action and use of Insulin] : action and use of short and long-acting insulin [Self Monitoring of Blood Glucose] : self monitoring of blood glucose [Insulin Self-Administration] : insulin self-administration [Injection Technique, Storage, Sharps Disposal] : injection technique, storage, and sharps disposal [Sick-Day Management] : sick-day management [Retinopathy Screening] : Patient was referred to ophthalmology for retinopathy screening [FreeTextEntry1] : 65  -year-old male with uncontrolled Type 2 DM, A1c 8.0 Aug 13, 2020, has been hovering around 8 range, recent septic arthritis s/p surgery, history of diabetic foot ulcer, HTN, HLD, here for follow up.  \par \par #1.Type 2 diabetes:\par A1c in August 13, 2020 was 8.0%.\par Slightly above goal of 7.0%.\par Will increase Lantus to 40 units at bedtime.  \par Will continue with metformin 1000mg bid \par Will continue with glipizide 10mg twice daily \par With history of diabetic ulcer therefore I do not feel comfortable giving patient SGLT2 inhibitor.\par GLP-1 receptor agonist is not approved by insurance.\par We discussed the importance of dietary changes as I think that's the biggest challenge for him.  \par I went over treatment of hypoglycemia\par He is to call me if there's any high or low glucose <70 or above 250mg/dl.  \par Microalbumin 235 in Feb 2020. \par Up to date with opthalmology. \par Up to date with podiatrist.  Whom he sees every 2 months.\par \par #2.Hyperlipidemia: \par LDL 50 mg/dl in May 2020.  \par Recommend to continue with atorvastatin 40mg daily.  \par \par #3. Hypertension: \par His blood pressure is persistent elevated, cont lisinopril 40 mg; stopped amlodipine due ot hypotension. +microalbumin and is following up with nephrologist. \par \par \par Diabetes Health Care Maintenance\par - Opthalmology up to date: Yes\par -Podiatry up to date: Yes\par -Antiplatelet agent: yes\par  -Urine microalbumin: +, Oct 2018, will repeat in 3 months. on ACEi\par -ACE-I/ARB: yes\par -Patient to call for persistent glucose < 70 or > 300\par -Discussed hypoglycemic protocol.  \par -Yearly flu vaccine recommended \par -Vitamin B12 normal, elevated, will decrease to every other day.  \par -Lipid panel check next visit. \par -Statin therapy: yes\par \par EDUCATION: Reviewed 'ABC' of diabetes management (respective goals in parentheses): A1C (<7), blood pressure (<140/90), and cholesterol (LDL <100).\par \par COMPLIANCE at present is estimated to be: good  \par FOLLOW UP: I recommend that frequency of visits for diabetes care be every 3 month \par  \par \par Followup in 3-4 months

## 2020-11-17 ENCOUNTER — RX RENEWAL (OUTPATIENT)
Age: 65
End: 2020-11-17

## 2020-11-19 ENCOUNTER — RX RENEWAL (OUTPATIENT)
Age: 65
End: 2020-11-19

## 2020-11-30 ENCOUNTER — RX RENEWAL (OUTPATIENT)
Age: 65
End: 2020-11-30

## 2020-12-23 PROBLEM — Z12.11 ENCOUNTER FOR FIT (FECAL IMMUNOCHEMICAL TEST) SCREENING: Status: RESOLVED | Noted: 2020-02-21 | Resolved: 2020-12-23

## 2021-01-26 ENCOUNTER — RX RENEWAL (OUTPATIENT)
Age: 66
End: 2021-01-26

## 2021-01-27 ENCOUNTER — APPOINTMENT (OUTPATIENT)
Dept: ENDOCRINOLOGY | Facility: CLINIC | Age: 66
End: 2021-01-27
Payer: COMMERCIAL

## 2021-01-27 VITALS
WEIGHT: 241 LBS | TEMPERATURE: 98.3 F | DIASTOLIC BLOOD PRESSURE: 80 MMHG | HEIGHT: 70 IN | SYSTOLIC BLOOD PRESSURE: 120 MMHG | BODY MASS INDEX: 34.5 KG/M2

## 2021-01-27 DIAGNOSIS — Z86.79 PERSONAL HISTORY OF OTHER DISEASES OF THE CIRCULATORY SYSTEM: ICD-10-CM

## 2021-01-27 LAB
GLUCOSE BLDC GLUCOMTR-MCNC: 139
HBA1C MFR BLD HPLC: 9.2

## 2021-01-27 PROCEDURE — 99072 ADDL SUPL MATRL&STAF TM PHE: CPT

## 2021-01-27 PROCEDURE — 99214 OFFICE O/P EST MOD 30 MIN: CPT

## 2021-01-27 RX ORDER — AMLODIPINE BESYLATE 5 MG/1
5 TABLET ORAL
Qty: 90 | Refills: 0 | Status: DISCONTINUED | COMMUNITY
Start: 2020-01-17 | End: 2021-01-27

## 2021-01-27 NOTE — HISTORY OF PRESENT ILLNESS
[FreeTextEntry1] : Patient is a -year-old pleasant 65-year-old male here for followup for his type 2 diabetes.  He was diagnosed with type 2 diabetes in 1990s.  His A1c is relatively uncontrolled.  A1C was 7.4% May 2020.  \par \par He had an infection on the right 3rd toe, he is going to have surgery in Feb 2021.  He will need a surgical clearance letter from me.  In September, he had hand surgery for staph aureus infection. Healing very well.  \par \par Patient has no known microvascular complication including CAD, CHF CVA in the past.\par \par Patient had a history of renal cancer, status post resection in April 2017.  He did not require any chemotherapy.  He follows up with .  \par \par Regarding his diabetic regimen, patient currently takes Lantus 40 units at bedtime, he had been increasing it, metformin 1000 mg twice daily, and glipizide 10 mg twice daily.\par \par Regarding hypertension, he is currently on Lisinopril 40mg daily, he is no longer taking amldoiping 5mg once daily (it was making him dizzy) \par \par Regarding hyperlipidemia, patient currently takes the atorvastatin 40 mg daily.  Reports no myalgias or myopathy. \par \par There was a lot of birthdays during Nov and now.  He has been eating out a lot. His mother also passed away in Nov 2019.  She was 101.  He reports that he hasn't been adherent with diet.  He had an infection in his toe. This was also in Nov 2019.  He went for MRI last Saturday there was no osteomyelitis.  He was on PO antibiotics and now he was instructed to put topical abx, was asked to put in vinegar as instruction of his podiatrist.  (Dr. Eve Escalante) \par \par About 5 months ago, went out to dinner, he started getting floater, like a spider web; he got the beginning of cataract on the left eye. He is following up with ophthalmologist.  There was mild fluid and blood behind his eye. \par \par He also had an infection on his hand with Staph Aureus; he just had surgery.  He was told he had some calcification on the hand by another doctor, had a injection but thinks that might have introduced the injection.  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  Due to the holidays.  Lack of exercise as well.\par A1c was found to be elevated today to 9.2% from 7.4% in May 2020.

## 2021-01-27 NOTE — ASSESSMENT
[FreeTextEntry1] : 65-year-old male with history of uncontrolled type 2 diabetes, hypertension, hyperlipidemia, pending toe surgery next month, here for endocrinology follow-up.\par \par #1.Type 2 diabetes:\par A1c was 9.2% today.\par Patient states that he has been not eating well due to a lot of holidays/celebrations over the last few months.\par I discussed with patient that basal bolus regimen will be necessary in the interim to bring his glucose to close to goal to prevent worsening infections.\par Will increase Lantus to 40 units at bedtime.  \par Will continue with metformin 1000mg bid \par Recommend to discontinue glipizide 10 mg twice daily for now.\par Recommend to start Humalog/NovoLog, 10 units with breakfast, 10 units with lunch, 15 units with dinner.\par Patient can adjust the insulin dosage 2 to 3 units based on p.o. intakes.\par I gave patient a freestyle dario to sensor and .  Will be activated in 1 hour.\par We will send in a prescription for freestyle sensor and .\par Patient is now on insulin 4 times daily, multiple daily injections.\par Patient is monitoring his glucose 4-6 times daily.\par He will also call me once he find out the brand of his glucometer.  He lost his glucometer but has a lot of strips and lancets left.\par With history of diabetic ulcer therefore I do not feel comfortable giving patient SGLT2 inhibitor.\par GLP-1 receptor agonist is not approved by insurance.\par We discussed the importance of dietary changes as I think that's the biggest challenge for him.  \par I went over treatment of hypoglycemia\par He is to call me if there's any high or low glucose <70 or above 250mg/dl.  \par Just saw eye doctor 2 months ago, no issues. \par He saw eye surgeon because of floater. \par \par #2.Hyperlipidemia: \par LDL 50 mg/dl in May 2020.  \par Recommend to continue with atorvastatin 40mg daily.  \par \par #3. Hypertension: \par His blood pressure is persistent elevated, cont lisinopril 40 mg; \par stopped on Calcium channel blocker due to hypotension.  \par \par COMPLIANCE at present is estimated to be: good  \par FOLLOW UP: I recommend that frequency of visits for diabetes care be every 3 month \par  \par \par FU in 3 months\par Contact office within next week to go over glucose log and adjust insulin dosage as needed. \par  [Diabetes Foot Care] : diabetes foot care [Long Term Vascular Complications] : long term vascular complications of diabetes [Carbohydrate Consistent Diet] : carbohydrate consistent diet [Importance of Diet and Exercise] : importance of diet and exercise to improve glycemic control, achieve weight loss and improve cardiovascular health [Exercise/Effect on Glucose] : exercise/effect on glucose [Hypoglycemia Management] : hypoglycemia management [Glucagon Use] : glucagon use [Ketone Testing] : ketone testing [Action and use of Insulin] : action and use of short and long-acting insulin [Self Monitoring of Blood Glucose] : self monitoring of blood glucose [Insulin Self-Administration] : insulin self-administration [Injection Technique, Storage, Sharps Disposal] : injection technique, storage, and sharps disposal [Sick-Day Management] : sick-day management [Retinopathy Screening] : Patient was referred to ophthalmology for retinopathy screening

## 2021-02-03 ENCOUNTER — OUTPATIENT (OUTPATIENT)
Dept: OUTPATIENT SERVICES | Facility: HOSPITAL | Age: 66
LOS: 1 days | Discharge: HOME | End: 2021-02-03
Payer: MEDICARE

## 2021-02-03 VITALS
WEIGHT: 242.95 LBS | TEMPERATURE: 98 F | SYSTOLIC BLOOD PRESSURE: 135 MMHG | HEIGHT: 70 IN | DIASTOLIC BLOOD PRESSURE: 76 MMHG | OXYGEN SATURATION: 99 % | HEART RATE: 84 BPM | RESPIRATION RATE: 18 BRPM

## 2021-02-03 DIAGNOSIS — I10 ESSENTIAL (PRIMARY) HYPERTENSION: ICD-10-CM

## 2021-02-03 DIAGNOSIS — L97.412 NON-PRESSURE CHRONIC ULCER OF RIGHT HEEL AND MIDFOOT WITH FAT LAYER EXPOSED: ICD-10-CM

## 2021-02-03 DIAGNOSIS — Z90.5 ACQUIRED ABSENCE OF KIDNEY: Chronic | ICD-10-CM

## 2021-02-03 DIAGNOSIS — E11.9 TYPE 2 DIABETES MELLITUS WITHOUT COMPLICATIONS: ICD-10-CM

## 2021-02-03 DIAGNOSIS — E11.59 TYPE 2 DIABETES MELLITUS WITH OTHER CIRCULATORY COMPLICATIONS: ICD-10-CM

## 2021-02-03 DIAGNOSIS — M88.0 OSTEITIS DEFORMANS OF SKULL: ICD-10-CM

## 2021-02-03 DIAGNOSIS — Z01.818 ENCOUNTER FOR OTHER PREPROCEDURAL EXAMINATION: ICD-10-CM

## 2021-02-03 DIAGNOSIS — L97.512 NON-PRESSURE CHRONIC ULCER OF OTHER PART OF RIGHT FOOT WITH FAT LAYER EXPOSED: ICD-10-CM

## 2021-02-03 DIAGNOSIS — E78.5 HYPERLIPIDEMIA, UNSPECIFIED: ICD-10-CM

## 2021-02-03 DIAGNOSIS — E66.2 MORBID (SEVERE) OBESITY WITH ALVEOLAR HYPOVENTILATION: ICD-10-CM

## 2021-02-03 LAB
ANION GAP SERPL CALC-SCNC: 6 MMOL/L — SIGNIFICANT CHANGE UP (ref 5–17)
APTT BLD: 33.6 SEC — SIGNIFICANT CHANGE UP (ref 27.5–35.5)
BASOPHILS # BLD AUTO: 0.03 K/UL — SIGNIFICANT CHANGE UP (ref 0–0.2)
BASOPHILS NFR BLD AUTO: 0.5 % — SIGNIFICANT CHANGE UP (ref 0–2)
BLD GP AB SCN SERPL QL: SIGNIFICANT CHANGE UP
BUN SERPL-MCNC: 23 MG/DL — SIGNIFICANT CHANGE UP (ref 7–23)
CALCIUM SERPL-MCNC: 10.1 MG/DL — SIGNIFICANT CHANGE UP (ref 8.5–10.1)
CHLORIDE SERPL-SCNC: 107 MMOL/L — SIGNIFICANT CHANGE UP (ref 96–108)
CO2 SERPL-SCNC: 28 MMOL/L — SIGNIFICANT CHANGE UP (ref 22–31)
CREAT SERPL-MCNC: 1.12 MG/DL — SIGNIFICANT CHANGE UP (ref 0.5–1.3)
EOSINOPHIL # BLD AUTO: 0.37 K/UL — SIGNIFICANT CHANGE UP (ref 0–0.5)
EOSINOPHIL NFR BLD AUTO: 6.6 % — HIGH (ref 0–6)
GLUCOSE SERPL-MCNC: 105 MG/DL — HIGH (ref 70–99)
HCT VFR BLD CALC: 42.5 % — SIGNIFICANT CHANGE UP (ref 39–50)
HGB BLD-MCNC: 13.7 G/DL — SIGNIFICANT CHANGE UP (ref 13–17)
IMM GRANULOCYTES NFR BLD AUTO: 0.2 % — SIGNIFICANT CHANGE UP (ref 0–1.5)
INR BLD: 1.03 RATIO — SIGNIFICANT CHANGE UP (ref 0.88–1.16)
LYMPHOCYTES # BLD AUTO: 1.38 K/UL — SIGNIFICANT CHANGE UP (ref 1–3.3)
LYMPHOCYTES # BLD AUTO: 24.6 % — SIGNIFICANT CHANGE UP (ref 13–44)
MCHC RBC-ENTMCNC: 28.7 PG — SIGNIFICANT CHANGE UP (ref 27–34)
MCHC RBC-ENTMCNC: 32.2 GM/DL — SIGNIFICANT CHANGE UP (ref 32–36)
MCV RBC AUTO: 88.9 FL — SIGNIFICANT CHANGE UP (ref 80–100)
MONOCYTES # BLD AUTO: 0.45 K/UL — SIGNIFICANT CHANGE UP (ref 0–0.9)
MONOCYTES NFR BLD AUTO: 8 % — SIGNIFICANT CHANGE UP (ref 2–14)
NEUTROPHILS # BLD AUTO: 3.37 K/UL — SIGNIFICANT CHANGE UP (ref 1.8–7.4)
NEUTROPHILS NFR BLD AUTO: 60.1 % — SIGNIFICANT CHANGE UP (ref 43–77)
NRBC # BLD: 0 /100 WBCS — SIGNIFICANT CHANGE UP (ref 0–0)
PLATELET # BLD AUTO: 190 K/UL — SIGNIFICANT CHANGE UP (ref 150–400)
POTASSIUM SERPL-MCNC: 4.4 MMOL/L — SIGNIFICANT CHANGE UP (ref 3.5–5.3)
POTASSIUM SERPL-SCNC: 4.4 MMOL/L — SIGNIFICANT CHANGE UP (ref 3.5–5.3)
PROTHROM AB SERPL-ACNC: 11.9 SEC — SIGNIFICANT CHANGE UP (ref 10.6–13.6)
RBC # BLD: 4.78 M/UL — SIGNIFICANT CHANGE UP (ref 4.2–5.8)
RBC # FLD: 13.3 % — SIGNIFICANT CHANGE UP (ref 10.3–14.5)
SODIUM SERPL-SCNC: 141 MMOL/L — SIGNIFICANT CHANGE UP (ref 135–145)
WBC # BLD: 5.61 K/UL — SIGNIFICANT CHANGE UP (ref 3.8–10.5)
WBC # FLD AUTO: 5.61 K/UL — SIGNIFICANT CHANGE UP (ref 3.8–10.5)

## 2021-02-03 PROCEDURE — 93010 ELECTROCARDIOGRAM REPORT: CPT

## 2021-02-03 RX ORDER — SODIUM CHLORIDE 9 MG/ML
3 INJECTION INTRAMUSCULAR; INTRAVENOUS; SUBCUTANEOUS EVERY 8 HOURS
Refills: 0 | Status: DISCONTINUED | OUTPATIENT
Start: 2021-02-09 | End: 2021-02-09

## 2021-02-03 RX ORDER — SODIUM CHLORIDE 9 MG/ML
1000 INJECTION, SOLUTION INTRAVENOUS
Refills: 0 | Status: DISCONTINUED | OUTPATIENT
Start: 2021-02-09 | End: 2021-02-09

## 2021-02-03 NOTE — H&P PST ADULT - NSICDXPROBLEM_GEN_ALL_CORE_FT
PROBLEM DIAGNOSES  Problem: Non-pressure chronic ulcer of other part of right foot with fat layer exposed  Assessment and Plan: Pre-op instructions given. Pt verbalized understanding  Chlorhexidine wash instructions given  Pending: Last Endocrinologist note/clearance + M/C + Covidpcr results    Problem: Type 2 diabetes mellitus  Assessment and Plan:     Problem: Hypertension  Assessment and Plan: JOSÉ MIGUEL precaution - stop bang 4  Pt instructed to take meds    Problem: Hyperlipidemia  Assessment and Plan: Pt instructed to take meds

## 2021-02-03 NOTE — H&P PST ADULT - ASSESSMENT
Non-pressure chronic ulcer of other part of right foot with fat layer exposed  Non-pressure chronic ulcer of other part of right foot with fat layer exposed   Distal diabe

## 2021-02-03 NOTE — H&P PST ADULT - NSICDXPASTMEDICALHX_GEN_ALL_CORE_FT
PAST MEDICAL HISTORY:  Diabetes mellitus type II    Dyslipidemia     Hypertension     Infected open wound right 3rd toe    Malignant neoplasm of unspecified kidney, except renal pelvis Malignnt tumor bernardo kiney  & had partial nephrectomy 2017    Obesity

## 2021-02-03 NOTE — H&P PST ADULT - HISTORY OF PRESENT ILLNESS
64y/o male with medical h/o HTN, HDL, T2DM and Ulcer, right foot 3rd toe. Pt reports PMHx Left partial nephrectomy for h/o malignant tumor, 2017. Pt presents today for PST for Amputation Right 3rd Toe scheduled for 2/9/2021

## 2021-02-03 NOTE — H&P PST ADULT - ATTENDING COMMENTS
Distal diabatic chronic ulce 3rd toe right  Probes to bone consistant with Osteomylitis  Goal is to remove infected bone   May need future surgery  Aware of Hga1c 9.1

## 2021-02-04 LAB
A1C WITH ESTIMATED AVERAGE GLUCOSE RESULT: 9.3 % — HIGH (ref 4–5.6)
ESTIMATED AVERAGE GLUCOSE: 220 MG/DL — HIGH (ref 68–114)
MRSA PCR RESULT.: SIGNIFICANT CHANGE UP
S AUREUS DNA NOSE QL NAA+PROBE: DETECTED

## 2021-02-05 ENCOUNTER — RX RENEWAL (OUTPATIENT)
Age: 66
End: 2021-02-05

## 2021-02-05 RX ORDER — MUPIROCIN 20 MG/G
1 OINTMENT TOPICAL
Qty: 10 | Refills: 0
Start: 2021-02-05 | End: 2021-02-09

## 2021-02-06 ENCOUNTER — APPOINTMENT (OUTPATIENT)
Dept: DISASTER EMERGENCY | Facility: CLINIC | Age: 66
End: 2021-02-06

## 2021-02-07 LAB — SARS-COV-2 N GENE NPH QL NAA+PROBE: NOT DETECTED

## 2021-02-08 ENCOUNTER — TRANSCRIPTION ENCOUNTER (OUTPATIENT)
Age: 66
End: 2021-02-08

## 2021-02-08 ENCOUNTER — APPOINTMENT (OUTPATIENT)
Dept: INTERNAL MEDICINE | Facility: CLINIC | Age: 66
End: 2021-02-08
Payer: COMMERCIAL

## 2021-02-08 VITALS
RESPIRATION RATE: 17 BRPM | BODY MASS INDEX: 34.93 KG/M2 | SYSTOLIC BLOOD PRESSURE: 138 MMHG | WEIGHT: 244 LBS | TEMPERATURE: 98.2 F | HEART RATE: 90 BPM | OXYGEN SATURATION: 99 % | DIASTOLIC BLOOD PRESSURE: 70 MMHG | HEIGHT: 70 IN

## 2021-02-08 DIAGNOSIS — M86.9 OSTEOMYELITIS, UNSPECIFIED: ICD-10-CM

## 2021-02-08 PROCEDURE — 99072 ADDL SUPL MATRL&STAF TM PHE: CPT

## 2021-02-08 PROCEDURE — 99214 OFFICE O/P EST MOD 30 MIN: CPT

## 2021-02-08 NOTE — PHYSICAL EXAM
[Well Nourished] : well nourished [No Acute Distress] : no acute distress [Well Developed] : well developed [Well-Appearing] : well-appearing [Normal Sclera/Conjunctiva] : normal sclera/conjunctiva [PERRL] : pupils equal round and reactive to light [EOMI] : extraocular movements intact [Normal Outer Ear/Nose] : the outer ears and nose were normal in appearance [Normal Oropharynx] : the oropharynx was normal [No JVD] : no jugular venous distention [No Lymphadenopathy] : no lymphadenopathy [Supple] : supple [Thyroid Normal, No Nodules] : the thyroid was normal and there were no nodules present [No Respiratory Distress] : no respiratory distress  [No Accessory Muscle Use] : no accessory muscle use [Clear to Auscultation] : lungs were clear to auscultation bilaterally [Normal Rate] : normal rate  [Regular Rhythm] : with a regular rhythm [Normal S1, S2] : normal S1 and S2 [No Murmur] : no murmur heard [No Abdominal Bruit] : a ~M bruit was not heard ~T in the abdomen [No Carotid Bruits] : no carotid bruits [No Varicosities] : no varicosities [Pedal Pulses Present] : the pedal pulses are present [No Edema] : there was no peripheral edema [No Palpable Aorta] : no palpable aorta [No Extremity Clubbing/Cyanosis] : no extremity clubbing/cyanosis [Soft] : abdomen soft [Non Tender] : non-tender [Non-distended] : non-distended [No Masses] : no abdominal mass palpated [No HSM] : no HSM [Normal Bowel Sounds] : normal bowel sounds [Normal Posterior Cervical Nodes] : no posterior cervical lymphadenopathy [Normal Anterior Cervical Nodes] : no anterior cervical lymphadenopathy [No CVA Tenderness] : no CVA  tenderness [No Spinal Tenderness] : no spinal tenderness [No Joint Swelling] : no joint swelling [Grossly Normal Strength/Tone] : grossly normal strength/tone [No Rash] : no rash [Coordination Grossly Intact] : coordination grossly intact [No Focal Deficits] : no focal deficits [Normal Gait] : normal gait [Deep Tendon Reflexes (DTR)] : deep tendon reflexes were 2+ and symmetric [Normal Affect] : the affect was normal [Normal Insight/Judgement] : insight and judgment were intact

## 2021-02-08 NOTE — HISTORY OF PRESENT ILLNESS
[No Pertinent Cardiac History] : no history of aortic stenosis, atrial fibrillation, coronary artery disease, recent myocardial infarction, or implantable device/pacemaker [No Pertinent Pulmonary History] : no history of asthma, COPD, sleep apnea, or smoking [No Adverse Anesthesia Reaction] : no adverse anesthesia reaction in self or family member [Diabetes] : diabetes [(Patient denies any chest pain, claudication, dyspnea on exertion, orthopnea, palpitations or syncope)] : Patient denies any chest pain, claudication, dyspnea on exertion, orthopnea, palpitations or syncope [Chronic Anticoagulation] : no chronic anticoagulation [Chronic Kidney Disease] : no chronic kidney disease [FreeTextEntry1] : amputation right foot third toe [FreeTextEntry2] : 02/08/2021 [FreeTextEntry3] : DR TAYLOR PABLO [FreeTextEntry4] : 65 years old male with type 2 diabetes , hypertension here for medical clearance , requested by DR PABLO for right foot toe amputation due to osteomyelitis, he states feeling well, denies acute complains

## 2021-02-08 NOTE — ASSESSMENT
[Patient Optimized for Surgery] : Patient optimized for surgery [No Further Testing Recommended] : no further testing recommended [As per surgery] : as per surgery [FreeTextEntry4] : * patient is medically clear for surgery\par * instructed to stop metformin and glipizide day of surgery\par * apply 20 units of Lantus tonight\par * schedule follow up in one month [FreeTextEntry7] : patient instructed to hold metformin and glipizide day of surgery, reduce lantus to 20 units tonight; to take lisinopril morning of surgery

## 2021-02-09 ENCOUNTER — RESULT REVIEW (OUTPATIENT)
Age: 66
End: 2021-02-09

## 2021-02-09 ENCOUNTER — OUTPATIENT (OUTPATIENT)
Dept: OUTPATIENT SERVICES | Facility: HOSPITAL | Age: 66
LOS: 1 days | Discharge: HOME | End: 2021-02-09
Payer: MEDICARE

## 2021-02-09 VITALS
DIASTOLIC BLOOD PRESSURE: 101 MMHG | HEART RATE: 75 BPM | TEMPERATURE: 98 F | OXYGEN SATURATION: 99 % | RESPIRATION RATE: 16 BRPM | SYSTOLIC BLOOD PRESSURE: 178 MMHG | WEIGHT: 240.97 LBS | HEIGHT: 70 IN

## 2021-02-09 VITALS
HEART RATE: 77 BPM | SYSTOLIC BLOOD PRESSURE: 143 MMHG | OXYGEN SATURATION: 98 % | TEMPERATURE: 98 F | RESPIRATION RATE: 18 BRPM | DIASTOLIC BLOOD PRESSURE: 85 MMHG

## 2021-02-09 DIAGNOSIS — Z90.5 ACQUIRED ABSENCE OF KIDNEY: Chronic | ICD-10-CM

## 2021-02-09 PROBLEM — T14.8XXA OTHER INJURY OF UNSPECIFIED BODY REGION, INITIAL ENCOUNTER: Chronic | Status: ACTIVE | Noted: 2021-02-03

## 2021-02-09 LAB — GLUCOSE BLDC GLUCOMTR-MCNC: 151 MG/DL — HIGH (ref 70–99)

## 2021-02-09 PROCEDURE — 88311 DECALCIFY TISSUE: CPT | Mod: 26

## 2021-02-09 PROCEDURE — 88305 TISSUE EXAM BY PATHOLOGIST: CPT | Mod: 26

## 2021-02-09 PROCEDURE — 88304 TISSUE EXAM BY PATHOLOGIST: CPT | Mod: 26

## 2021-02-09 RX ORDER — ONDANSETRON 8 MG/1
4 TABLET, FILM COATED ORAL ONCE
Refills: 0 | Status: DISCONTINUED | OUTPATIENT
Start: 2021-02-09 | End: 2021-02-09

## 2021-02-09 RX ORDER — FENTANYL CITRATE 50 UG/ML
50 INJECTION INTRAVENOUS ONCE
Refills: 0 | Status: DISCONTINUED | OUTPATIENT
Start: 2021-02-09 | End: 2021-02-09

## 2021-02-09 RX ORDER — ATORVASTATIN CALCIUM 80 MG/1
1 TABLET, FILM COATED ORAL
Qty: 0 | Refills: 0 | DISCHARGE

## 2021-02-09 RX ORDER — SODIUM CHLORIDE 9 MG/ML
1000 INJECTION, SOLUTION INTRAVENOUS
Refills: 0 | Status: DISCONTINUED | OUTPATIENT
Start: 2021-02-09 | End: 2021-02-09

## 2021-02-09 RX ORDER — METFORMIN HYDROCHLORIDE 850 MG/1
1 TABLET ORAL
Qty: 0 | Refills: 0 | DISCHARGE

## 2021-02-09 RX ORDER — FENTANYL CITRATE 50 UG/ML
25 INJECTION INTRAVENOUS
Refills: 0 | Status: DISCONTINUED | OUTPATIENT
Start: 2021-02-09 | End: 2021-02-09

## 2021-02-09 RX ORDER — AMLODIPINE BESYLATE 2.5 MG/1
1 TABLET ORAL
Qty: 0 | Refills: 0 | DISCHARGE

## 2021-02-09 RX ORDER — LISINOPRIL 2.5 MG/1
1 TABLET ORAL
Qty: 0 | Refills: 0 | DISCHARGE

## 2021-02-09 RX ORDER — ENOXAPARIN SODIUM 100 MG/ML
30 INJECTION SUBCUTANEOUS
Qty: 0 | Refills: 0 | DISCHARGE

## 2021-02-09 RX ORDER — CHOLECALCIFEROL (VITAMIN D3) 125 MCG
1 CAPSULE ORAL
Qty: 0 | Refills: 0 | DISCHARGE

## 2021-02-09 RX ORDER — PREGABALIN 225 MG/1
1 CAPSULE ORAL
Qty: 0 | Refills: 0 | DISCHARGE

## 2021-02-09 RX ADMIN — SODIUM CHLORIDE 100 MILLILITER(S): 9 INJECTION, SOLUTION INTRAVENOUS at 15:39

## 2021-02-09 NOTE — ASU PATIENT PROFILE, ADULT - PMH
Diabetes mellitus  type II  Dyslipidemia    Hypertension    Infected open wound  right 3rd toe  Malignant neoplasm of unspecified kidney, except renal pelvis  Malignnt tumor bernardo kiney  & had partial nephrectomy 2017  Obesity

## 2021-02-09 NOTE — ASU DISCHARGE PLAN (ADULT/PEDIATRIC) - ASU DC SPECIAL INSTRUCTIONSFT
Please be weight bearing as tolerated to R foot. Please follow up w/ Dr. Kincaid within a week of surgery. Please keep dressing clean, dry, and intact until follow up.

## 2021-02-10 LAB
GRAM STN FLD: SIGNIFICANT CHANGE UP
SPECIMEN SOURCE: SIGNIFICANT CHANGE UP

## 2021-02-10 PROCEDURE — 73630 X-RAY EXAM OF FOOT: CPT | Mod: 26,RT

## 2021-02-12 LAB
-  AMPICILLIN/SULBACTAM: SIGNIFICANT CHANGE UP
-  CEFAZOLIN: SIGNIFICANT CHANGE UP
-  CLINDAMYCIN: SIGNIFICANT CHANGE UP
-  ERYTHROMYCIN: SIGNIFICANT CHANGE UP
-  GENTAMICIN: SIGNIFICANT CHANGE UP
-  OXACILLIN: SIGNIFICANT CHANGE UP
-  PENICILLIN: SIGNIFICANT CHANGE UP
-  RIFAMPIN: SIGNIFICANT CHANGE UP
-  TETRACYCLINE: SIGNIFICANT CHANGE UP
-  TRIMETHOPRIM/SULFAMETHOXAZOLE: SIGNIFICANT CHANGE UP
-  VANCOMYCIN: SIGNIFICANT CHANGE UP
METHOD TYPE: SIGNIFICANT CHANGE UP
SURGICAL PATHOLOGY STUDY: SIGNIFICANT CHANGE UP

## 2021-02-14 LAB
CULTURE RESULTS: SIGNIFICANT CHANGE UP
ORGANISM # SPEC MICROSCOPIC CNT: SIGNIFICANT CHANGE UP
ORGANISM # SPEC MICROSCOPIC CNT: SIGNIFICANT CHANGE UP
SPECIMEN SOURCE: SIGNIFICANT CHANGE UP

## 2021-02-15 DIAGNOSIS — E66.9 OBESITY, UNSPECIFIED: ICD-10-CM

## 2021-02-15 DIAGNOSIS — I10 ESSENTIAL (PRIMARY) HYPERTENSION: ICD-10-CM

## 2021-02-15 DIAGNOSIS — E11.69 TYPE 2 DIABETES MELLITUS WITH OTHER SPECIFIED COMPLICATION: ICD-10-CM

## 2021-02-15 DIAGNOSIS — Z87.891 PERSONAL HISTORY OF NICOTINE DEPENDENCE: ICD-10-CM

## 2021-02-15 DIAGNOSIS — Z90.5 ACQUIRED ABSENCE OF KIDNEY: ICD-10-CM

## 2021-02-15 DIAGNOSIS — E11.42 TYPE 2 DIABETES MELLITUS WITH DIABETIC POLYNEUROPATHY: ICD-10-CM

## 2021-02-15 DIAGNOSIS — E78.5 HYPERLIPIDEMIA, UNSPECIFIED: ICD-10-CM

## 2021-02-15 DIAGNOSIS — Z85.528 PERSONAL HISTORY OF OTHER MALIGNANT NEOPLASM OF KIDNEY: ICD-10-CM

## 2021-02-15 DIAGNOSIS — M86.171 OTHER ACUTE OSTEOMYELITIS, RIGHT ANKLE AND FOOT: ICD-10-CM

## 2021-02-15 DIAGNOSIS — Z79.4 LONG TERM (CURRENT) USE OF INSULIN: ICD-10-CM

## 2021-02-15 DIAGNOSIS — M19.90 UNSPECIFIED OSTEOARTHRITIS, UNSPECIFIED SITE: ICD-10-CM

## 2021-02-15 DIAGNOSIS — M86.671 OTHER CHRONIC OSTEOMYELITIS, RIGHT ANKLE AND FOOT: ICD-10-CM

## 2021-03-10 ENCOUNTER — APPOINTMENT (OUTPATIENT)
Dept: INTERNAL MEDICINE | Facility: CLINIC | Age: 66
End: 2021-03-10
Payer: COMMERCIAL

## 2021-03-10 ENCOUNTER — NON-APPOINTMENT (OUTPATIENT)
Age: 66
End: 2021-03-10

## 2021-03-10 VITALS
DIASTOLIC BLOOD PRESSURE: 90 MMHG | RESPIRATION RATE: 16 BRPM | TEMPERATURE: 98.1 F | BODY MASS INDEX: 34.93 KG/M2 | WEIGHT: 244 LBS | OXYGEN SATURATION: 99 % | HEIGHT: 70 IN | HEART RATE: 82 BPM | SYSTOLIC BLOOD PRESSURE: 183 MMHG

## 2021-03-10 VITALS
HEART RATE: 82 BPM | WEIGHT: 244 LBS | SYSTOLIC BLOOD PRESSURE: 183 MMHG | HEIGHT: 70 IN | RESPIRATION RATE: 16 BRPM | OXYGEN SATURATION: 99 % | BODY MASS INDEX: 34.93 KG/M2 | TEMPERATURE: 98.1 F | DIASTOLIC BLOOD PRESSURE: 90 MMHG

## 2021-03-10 DIAGNOSIS — M86.9 OSTEOMYELITIS, UNSPECIFIED: ICD-10-CM

## 2021-03-10 DIAGNOSIS — D17.0 BENIGN LIPOMATOUS NEOPLASM OF SKIN AND SUBCUTANEOUS TISSUE OF HEAD, FACE AND NECK: ICD-10-CM

## 2021-03-10 PROCEDURE — 99213 OFFICE O/P EST LOW 20 MIN: CPT

## 2021-03-10 PROCEDURE — 99072 ADDL SUPL MATRL&STAF TM PHE: CPT

## 2021-03-10 NOTE — HISTORY OF PRESENT ILLNESS
[FreeTextEntry1] : follow up after right foot surgery, diabetes\par  [de-identified] : 65 years old male here for follow up, after right foot osteomyelitis surgery of toe, he states doing well, no pain, of antibiotics, his blood glucose has been controlled, he is on Lantus 40 units at bedtime and Humalog 15 units after meals ; he is due for annual physical exam

## 2021-03-10 NOTE — PHYSICAL EXAM
[No Acute Distress] : no acute distress [Well Nourished] : well nourished [Well Developed] : well developed [Well-Appearing] : well-appearing [Normal Sclera/Conjunctiva] : normal sclera/conjunctiva [PERRL] : pupils equal round and reactive to light [EOMI] : extraocular movements intact [Normal Outer Ear/Nose] : the outer ears and nose were normal in appearance [Normal Oropharynx] : the oropharynx was normal [No JVD] : no jugular venous distention [No Lymphadenopathy] : no lymphadenopathy [Supple] : supple [Thyroid Normal, No Nodules] : the thyroid was normal and there were no nodules present [No Respiratory Distress] : no respiratory distress  [No Accessory Muscle Use] : no accessory muscle use [Clear to Auscultation] : lungs were clear to auscultation bilaterally [Normal Rate] : normal rate  [Regular Rhythm] : with a regular rhythm [Normal S1, S2] : normal S1 and S2 [No Murmur] : no murmur heard [No Carotid Bruits] : no carotid bruits [No Abdominal Bruit] : a ~M bruit was not heard ~T in the abdomen [No Varicosities] : no varicosities [Pedal Pulses Present] : the pedal pulses are present [No Edema] : there was no peripheral edema [No Palpable Aorta] : no palpable aorta [No Extremity Clubbing/Cyanosis] : no extremity clubbing/cyanosis [Soft] : abdomen soft [Non Tender] : non-tender [Non-distended] : non-distended [No Masses] : no abdominal mass palpated [No HSM] : no HSM [Normal Bowel Sounds] : normal bowel sounds [Normal Posterior Cervical Nodes] : no posterior cervical lymphadenopathy [Normal Anterior Cervical Nodes] : no anterior cervical lymphadenopathy [No CVA Tenderness] : no CVA  tenderness [No Spinal Tenderness] : no spinal tenderness [No Joint Swelling] : no joint swelling [Grossly Normal Strength/Tone] : grossly normal strength/tone [No Rash] : no rash [Coordination Grossly Intact] : coordination grossly intact [No Focal Deficits] : no focal deficits [Normal Gait] : normal gait [Deep Tendon Reflexes (DTR)] : deep tendon reflexes were 2+ and symmetric [Normal Affect] : the affect was normal [Normal Insight/Judgement] : insight and judgment were intact [de-identified] : small soft tissue scalp nodule occipital area, painless,mobile

## 2021-03-10 NOTE — ASSESSMENT
[FreeTextEntry1] : 1. type 2 diabetes uncontrolled\par * Lantus switched to Basaglar, not longer covered by insurance\par * continue Humalog 15 units tid before meals, glipizide and metformin\par * diet compliance stressed\par 2. osteomyelitis third toe right foot\par * s/p surgery, resolved\par 3. hypertension\par * medication compliance stressed; he forgot to take medication this morning\par * low sodium diet reinforced\par 4. high cholesterol\par * dietary counselling\par * continue statin\par 5. lipoma of scalp\par * dermatology referral\par * will follow up in two weeks

## 2021-03-11 LAB
25(OH)D3 SERPL-MCNC: 58.9 NG/ML
ALBUMIN SERPL ELPH-MCNC: 4.7 G/DL
ALP BLD-CCNC: 97 U/L
ALT SERPL-CCNC: 24 U/L
ANION GAP SERPL CALC-SCNC: 10 MMOL/L
APPEARANCE: CLEAR
AST SERPL-CCNC: 25 U/L
BACTERIA: NEGATIVE
BASOPHILS # BLD AUTO: 0.02 K/UL
BASOPHILS NFR BLD AUTO: 0.3 %
BILIRUB SERPL-MCNC: 1.1 MG/DL
BILIRUBIN URINE: NEGATIVE
BLOOD URINE: NEGATIVE
BUN SERPL-MCNC: 21 MG/DL
CALCIUM SERPL-MCNC: 10.6 MG/DL
CHLORIDE SERPL-SCNC: 101 MMOL/L
CHOLEST SERPL-MCNC: 119 MG/DL
CO2 SERPL-SCNC: 30 MMOL/L
COLOR: NORMAL
CREAT SERPL-MCNC: 1.13 MG/DL
EOSINOPHIL # BLD AUTO: 0.28 K/UL
EOSINOPHIL NFR BLD AUTO: 4 %
ESTIMATED AVERAGE GLUCOSE: 189 MG/DL
GLUCOSE QUALITATIVE U: NEGATIVE
GLUCOSE SERPL-MCNC: 118 MG/DL
HBA1C MFR BLD HPLC: 8.2 %
HCT VFR BLD CALC: 43.4 %
HDLC SERPL-MCNC: 34 MG/DL
HGB BLD-MCNC: 14.3 G/DL
HYALINE CASTS: 0 /LPF
IMM GRANULOCYTES NFR BLD AUTO: 0.1 %
KETONES URINE: NEGATIVE
LDLC SERPL CALC-MCNC: 60 MG/DL
LEUKOCYTE ESTERASE URINE: NEGATIVE
LYMPHOCYTES # BLD AUTO: 1.54 K/UL
LYMPHOCYTES NFR BLD AUTO: 22.1 %
MAN DIFF?: NORMAL
MCHC RBC-ENTMCNC: 29.5 PG
MCHC RBC-ENTMCNC: 32.9 GM/DL
MCV RBC AUTO: 89.5 FL
MICROSCOPIC-UA: NORMAL
MONOCYTES # BLD AUTO: 0.51 K/UL
MONOCYTES NFR BLD AUTO: 7.3 %
NEUTROPHILS # BLD AUTO: 4.61 K/UL
NEUTROPHILS NFR BLD AUTO: 66.2 %
NITRITE URINE: NEGATIVE
NONHDLC SERPL-MCNC: 84 MG/DL
PH URINE: 6
PLATELET # BLD AUTO: 192 K/UL
POTASSIUM SERPL-SCNC: 5.3 MMOL/L
PROT SERPL-MCNC: 7.8 G/DL
PROTEIN URINE: ABNORMAL
PSA FREE FLD-MCNC: 31 %
PSA FREE SERPL-MCNC: 0.65 NG/ML
PSA SERPL-MCNC: 2.09 NG/ML
RBC # BLD: 4.85 M/UL
RBC # FLD: 13.8 %
RED BLOOD CELLS URINE: 1 /HPF
SODIUM SERPL-SCNC: 141 MMOL/L
SPECIFIC GRAVITY URINE: 1.02
SQUAMOUS EPITHELIAL CELLS: 1 /HPF
T4 FREE SERPL-MCNC: 1.1 NG/DL
TRIGL SERPL-MCNC: 122 MG/DL
TSH SERPL-ACNC: 2.97 UIU/ML
UROBILINOGEN URINE: NORMAL
VIT B12 SERPL-MCNC: 654 PG/ML
WBC # FLD AUTO: 6.97 K/UL
WHITE BLOOD CELLS URINE: 4 /HPF

## 2021-03-17 ENCOUNTER — APPOINTMENT (OUTPATIENT)
Dept: CT IMAGING | Facility: IMAGING CENTER | Age: 66
End: 2021-03-17
Payer: COMMERCIAL

## 2021-03-17 ENCOUNTER — OUTPATIENT (OUTPATIENT)
Dept: OUTPATIENT SERVICES | Facility: HOSPITAL | Age: 66
LOS: 1 days | End: 2021-03-17
Payer: COMMERCIAL

## 2021-03-17 ENCOUNTER — APPOINTMENT (OUTPATIENT)
Dept: RADIOLOGY | Facility: IMAGING CENTER | Age: 66
End: 2021-03-17
Payer: COMMERCIAL

## 2021-03-17 DIAGNOSIS — Z90.5 ACQUIRED ABSENCE OF KIDNEY: Chronic | ICD-10-CM

## 2021-03-17 DIAGNOSIS — Z00.8 ENCOUNTER FOR OTHER GENERAL EXAMINATION: ICD-10-CM

## 2021-03-17 DIAGNOSIS — C64.9 MALIGNANT NEOPLASM OF UNSPECIFIED KIDNEY, EXCEPT RENAL PELVIS: ICD-10-CM

## 2021-03-17 PROCEDURE — 71046 X-RAY EXAM CHEST 2 VIEWS: CPT | Mod: 26

## 2021-03-17 PROCEDURE — 74170 CT ABD WO CNTRST FLWD CNTRST: CPT

## 2021-03-17 PROCEDURE — 71046 X-RAY EXAM CHEST 2 VIEWS: CPT

## 2021-03-17 PROCEDURE — 74170 CT ABD WO CNTRST FLWD CNTRST: CPT | Mod: 26

## 2021-03-23 ENCOUNTER — APPOINTMENT (OUTPATIENT)
Dept: ORTHOPEDIC SURGERY | Facility: CLINIC | Age: 66
End: 2021-03-23

## 2021-03-24 ENCOUNTER — APPOINTMENT (OUTPATIENT)
Dept: INTERNAL MEDICINE | Facility: CLINIC | Age: 66
End: 2021-03-24

## 2021-03-29 RX ORDER — GLIPIZIDE 10 MG/1
10 TABLET ORAL
Qty: 180 | Refills: 1 | Status: DISCONTINUED | COMMUNITY
Start: 2018-06-29 | End: 2021-03-29

## 2021-03-29 RX ORDER — INSULIN GLARGINE 100 [IU]/ML
100 INJECTION, SOLUTION SUBCUTANEOUS
Qty: 5 | Refills: 2 | Status: DISCONTINUED | COMMUNITY
Start: 2021-03-10 | End: 2021-03-29

## 2021-03-29 RX ORDER — INSULIN GLARGINE 100 [IU]/ML
100 INJECTION, SOLUTION SUBCUTANEOUS
Qty: 15 | Refills: 1 | Status: DISCONTINUED | COMMUNITY
Start: 2021-03-17 | End: 2021-03-29

## 2021-04-16 ENCOUNTER — APPOINTMENT (OUTPATIENT)
Dept: UROLOGY | Facility: CLINIC | Age: 66
End: 2021-04-16
Payer: COMMERCIAL

## 2021-04-16 ENCOUNTER — APPOINTMENT (OUTPATIENT)
Dept: CT IMAGING | Facility: IMAGING CENTER | Age: 66
End: 2021-04-16
Payer: COMMERCIAL

## 2021-04-16 ENCOUNTER — OUTPATIENT (OUTPATIENT)
Dept: OUTPATIENT SERVICES | Facility: HOSPITAL | Age: 66
LOS: 1 days | End: 2021-04-16
Payer: COMMERCIAL

## 2021-04-16 VITALS
SYSTOLIC BLOOD PRESSURE: 164 MMHG | RESPIRATION RATE: 17 BRPM | WEIGHT: 244 LBS | DIASTOLIC BLOOD PRESSURE: 86 MMHG | HEART RATE: 88 BPM | BODY MASS INDEX: 34.93 KG/M2 | HEIGHT: 70 IN | TEMPERATURE: 98.1 F

## 2021-04-16 DIAGNOSIS — Z90.5 ACQUIRED ABSENCE OF KIDNEY: Chronic | ICD-10-CM

## 2021-04-16 DIAGNOSIS — N28.89 OTHER SPECIFIED DISORDERS OF KIDNEY AND URETER: ICD-10-CM

## 2021-04-16 PROCEDURE — 71250 CT THORAX DX C-: CPT

## 2021-04-16 PROCEDURE — 71250 CT THORAX DX C-: CPT | Mod: 26

## 2021-04-16 PROCEDURE — 99072 ADDL SUPL MATRL&STAF TM PHE: CPT

## 2021-04-16 PROCEDURE — 99214 OFFICE O/P EST MOD 30 MIN: CPT

## 2021-04-16 NOTE — HISTORY OF PRESENT ILLNESS
[FreeTextEntry1] : Mr. 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 \par \par CT A/P this year neg for metastatic disease, small 3 mm lower L lobe nodule. ?Unclear history of COVID in March 2020. [Urinary Incontinence] : no urinary incontinence [Urinary Retention] : no urinary retention [Urinary Urgency] : no urinary urgency [Urinary Frequency] : no urinary frequency

## 2021-04-21 DIAGNOSIS — R91.8 OTHER NONSPECIFIC ABNORMAL FINDING OF LUNG FIELD: ICD-10-CM

## 2021-04-23 ENCOUNTER — APPOINTMENT (OUTPATIENT)
Dept: ENDOCRINOLOGY | Facility: CLINIC | Age: 66
End: 2021-04-23
Payer: COMMERCIAL

## 2021-04-23 VITALS
TEMPERATURE: 97.4 F | DIASTOLIC BLOOD PRESSURE: 81 MMHG | OXYGEN SATURATION: 98 % | HEART RATE: 104 BPM | WEIGHT: 242 LBS | BODY MASS INDEX: 34.72 KG/M2 | SYSTOLIC BLOOD PRESSURE: 155 MMHG

## 2021-04-23 PROCEDURE — 99214 OFFICE O/P EST MOD 30 MIN: CPT

## 2021-04-23 PROCEDURE — 99072 ADDL SUPL MATRL&STAF TM PHE: CPT

## 2021-04-23 NOTE — HISTORY OF PRESENT ILLNESS
[FreeTextEntry1] : Patient is a 65-year-old pleasant 65-year-old male here for followup for his type 2 diabetes.  He was diagnosed with type 2 diabetes in 1990s.  \par \par He had an infection on the right 3rd toe, he is going to have surgery in Feb 2021.  He will need a surgical clearance letter from me.  In September, he had hand surgery for staph aureus infection. Healing very well.  Now seeing podiatrist every few months.  \par \par Patient has no known microvascular complication including CAD, CHF CVA in the past.\par \par Patient had a history of renal cancer, status post resection in April 2017.  He did not require any chemotherapy.  He follows up with .  \par \par Regarding hypertension, he is currently on Lisinopril 40mg daily states that his blood pressure is a little bit at home\par \par Regarding hyperlipidemia, patient currently takes the atorvastatin 40 mg daily.  Reports no myalgias or myopathy. \par \par Last visit, patient was found to have uncontrolled A1c of 9.2%, it was because there was a lot of birthdays during Nov and now.  He has been eating out a lot. His mother also passed away in Nov 2019.  She was 101.  He reports that he hasn't been adherent with diet.\par \par He also had an infection on his hand with Staph Aureus; he just had surgery.  He was told he had some calcification on the hand by another doctor, had a injection but thinks that might have introduced the injection.

## 2021-04-23 NOTE — ASSESSMENT
[FreeTextEntry1] : 65-year-old male with history of uncontrolled type 2 diabetes, hypertension, hyperlipidemia, pending toe surgery next month, here for endocrinology follow-up.\par \par 1.  Type 2 diabetes\par A1c 8.2% March 10, 2021\par This is an improvement from last visit.\par He hasn't been checking his glucose therefore difficult to make adjustments \par He just got Dexcom Sensor and will be setting it up, offered patient to come for teaching but he feels he can manage at home with his wife.\par Will increase Lantus to 40 units at bedtime\par Will continue with metformin 1000mg bid \par Recommend to start Humalog/NovoLog, 10-15 units tid with meals.  He was taking Novolog before his meals. \par With history of diabetic ulcer therefore I do not feel comfortable giving patient SGLT2 inhibitor.\par He had side effect with GLP1 RA, had tried Trulicity but was having side effects.  \par We discussed the importance of dietary changes as I think that's the biggest challenge for him.  \par I went over treatment of hypoglycemia\par He is to call me if there's any high or low glucose <70 or above 250mg/dl.  \par Just saw eye doctor 2 months ago, no issues. \par He saw eye surgeon because of floater. \par \par 2.  Hyperlipidemia\par LDL was checked in March 10, 2021, 60 mg/dL\par Recommend to continue with atorvastatin 40mg daily.  \par \par 3.  Hypertension: \par Blood pressure today 135/81\par Elevated today\par Currently on lisinopril 40 mg once daily\par Microalbumin/creatinine ratio 235 2/21/2020, will check microalbumin/creatinine ratio today\par EGFR 60 mg/min 3/10/2021\par \par COMPLIANCE at present is estimated to be: good  \par FOLLOW UP: I recommend that frequency of visits for diabetes care be every 3 month \par  \par Follow-up in 2 months with CDE, to adjust insulin regimen based on Dexcom G6 readings.\par Follow-up with me in 4 months\par  [Diabetes Foot Care] : diabetes foot care [Long Term Vascular Complications] : long term vascular complications of diabetes [Carbohydrate Consistent Diet] : carbohydrate consistent diet [Importance of Diet and Exercise] : importance of diet and exercise to improve glycemic control, achieve weight loss and improve cardiovascular health [Exercise/Effect on Glucose] : exercise/effect on glucose [Hypoglycemia Management] : hypoglycemia management [Glucagon Use] : glucagon use [Ketone Testing] : ketone testing [Action and use of Insulin] : action and use of short and long-acting insulin [Self Monitoring of Blood Glucose] : self monitoring of blood glucose [Insulin Self-Administration] : insulin self-administration [Injection Technique, Storage, Sharps Disposal] : injection technique, storage, and sharps disposal [Sick-Day Management] : sick-day management [Retinopathy Screening] : Patient was referred to ophthalmology for retinopathy screening

## 2021-06-29 ENCOUNTER — APPOINTMENT (OUTPATIENT)
Dept: ENDOCRINOLOGY | Facility: CLINIC | Age: 66
End: 2021-06-29

## 2021-07-02 ENCOUNTER — RX RENEWAL (OUTPATIENT)
Age: 66
End: 2021-07-02

## 2021-08-05 ENCOUNTER — RX RENEWAL (OUTPATIENT)
Age: 66
End: 2021-08-05

## 2021-09-02 ENCOUNTER — TRANSCRIPTION ENCOUNTER (OUTPATIENT)
Age: 66
End: 2021-09-02

## 2021-09-03 ENCOUNTER — RX RENEWAL (OUTPATIENT)
Age: 66
End: 2021-09-03

## 2021-09-12 ENCOUNTER — TRANSCRIPTION ENCOUNTER (OUTPATIENT)
Age: 66
End: 2021-09-12

## 2021-09-20 ENCOUNTER — APPOINTMENT (OUTPATIENT)
Dept: ENDOCRINOLOGY | Facility: CLINIC | Age: 66
End: 2021-09-20
Payer: COMMERCIAL

## 2021-09-20 VITALS
WEIGHT: 252 LBS | DIASTOLIC BLOOD PRESSURE: 100 MMHG | SYSTOLIC BLOOD PRESSURE: 160 MMHG | OXYGEN SATURATION: 98 % | TEMPERATURE: 98.2 F | HEART RATE: 97 BPM | BODY MASS INDEX: 36.16 KG/M2

## 2021-09-20 PROCEDURE — 82962 GLUCOSE BLOOD TEST: CPT

## 2021-09-20 PROCEDURE — 99214 OFFICE O/P EST MOD 30 MIN: CPT | Mod: 25

## 2021-09-20 PROCEDURE — 83036 HEMOGLOBIN GLYCOSYLATED A1C: CPT | Mod: QW

## 2021-09-20 PROCEDURE — 95251 CONT GLUC MNTR ANALYSIS I&R: CPT

## 2021-09-20 NOTE — ADDENDUM
[FreeTextEntry1] : ==========\par CGM REPORT:\par ==========\par Patient underwent a CGM study from September 6 2021–September 20th 2021.  14 days of uninterrupted data were downloaded\par The reason for the study was type 2 diabetes\par Pt´s current therapeutic regimen includes: Basal bolus insulin as above\par The patient´s blood sugars were in target 43% of the time, above target 57% of the time and below target 0% of the time\par Average blood sugar was 194 mg/dL\par Glucose Managment Indicator (GMI) not available %\par Glucose variability 30% %\par With a median of 189 mg/dL, high as  to 82 mg/dL and level 66 mg/dL\par Defined as percent coefficient of variation (%CV); target <36%\par \par Hypoglycemia: The patient had no low glucose events, with average duration of N/A minutes.  N/A of these were severe (<50mg/dL or requiring assistance). The hypoglycemic events typically N/A\par \par Overnight trends appreciated were overall elevated.\par Daytime trends appreciated were postprandial hyperglycemia\par \par Recommended therapeutic changes based on CGM Study:\par -Patient to use consistent amount of Tresiba at nighttime.\par -Recommend to start with 30 units at bedtime.\par Continue with Humalog 15 units 3 times daily with meals.  Biggest meal was at dinnertime.  Consider decreasing the amount of carbohydrate versus increasing Humalog to 18 units with dinnertime.\par

## 2021-09-20 NOTE — ASSESSMENT
[FreeTextEntry1] : 65-year-old male with history of uncontrolled type 2 diabetes, hypertension, hyperlipidemia, pending toe surgery next month, here for endocrinology follow-up.\par \par 1.  Type 2 diabetes\par A1c 6.5% 9/20/2021\par This is an improvement from last visit.\par He hasn't been checking his glucose therefore difficult to make adjustments \par He just got Dexcom Sensor and will be setting it up, offered patient to come for teaching but he feels he can manage at home with his wife.\par Will increase Tresiba FlexTouch to 40 units at bedtime (if glucose is 120, he takes 20 units at bedtime; if glucose is 180 takes 40 units at bedtime); recommend patient to take consistent amount every day; start with Treseiba 30 units at bedtime, if glucose less than 120mg/dl, he can take a small 15 gram Carb.  \par Will continue with metformin 1000mg bid \par Recommend to start Humalog/NovoLog, 15 units tid with meals.  He was taking Novolog before his meals. Consider increasing Humalog/Novolog to 18 units with dinner if big meal.  \par With history of diabetic ulcer therefore I do not feel comfortable giving patient SGLT2 inhibitor.\par He had side effect with GLP1 RA, had tried Trulicity but was having side effects.  \par We discussed the importance of dietary changes as I think that's the biggest challenge for him.  \par I went over treatment of hypoglycemia\par He is to call me if there's any high or low glucose <70 or above 250mg/dl.  \par Opthal: up to date, just saw eye doctor. In Aug 2021, tried to give him a shot to improve floater, did a laser service and it was improved for one week. \par Podiatry: He is following up with podiatrist. \par \par 2.  Hyperlipidemia\par LDL was checked in March 10, 2021, 60 mg/dL\par Recommend to continue with atorvastatin 40mg daily.  \par \par 3.  Hypertension: \par Blood pressure today 160/100; he went to dinner last night, he had a high salt diet.  \par Continue to monitor at home. \par Currently on lisinopril 40 mg once daily\par Microalbumin/creatinine ratio 235 2/21/2020, will check microalbumin/creatinine ratio today\par EGFR 60 mg/min 3/10/2021\par \par COMPLIANCE at present is estimated to be: good  \par FOLLOW UP: I recommend that frequency of visits for diabetes care be every 3 month \par \par FU with Dr. Felipe x1 time in March 2022 while I am out on leave and follow up with me afterwards. \par Patient will schedule new PMD visit soon as well.   [Diabetes Foot Care] : diabetes foot care [Long Term Vascular Complications] : long term vascular complications of diabetes [Carbohydrate Consistent Diet] : carbohydrate consistent diet [Importance of Diet and Exercise] : importance of diet and exercise to improve glycemic control, achieve weight loss and improve cardiovascular health [Exercise/Effect on Glucose] : exercise/effect on glucose [Hypoglycemia Management] : hypoglycemia management [Glucagon Use] : glucagon use [Ketone Testing] : ketone testing [Action and use of Insulin] : action and use of short and long-acting insulin [Self Monitoring of Blood Glucose] : self monitoring of blood glucose [Insulin Self-Administration] : insulin self-administration [Injection Technique, Storage, Sharps Disposal] : injection technique, storage, and sharps disposal [Sick-Day Management] : sick-day management [Retinopathy Screening] : Patient was referred to ophthalmology for retinopathy screening

## 2021-09-20 NOTE — PHYSICAL EXAM
[Alert] : alert [Well Nourished] : well nourished [No Acute Distress] : no acute distress [Well Developed] : well developed [Normal Sclera/Conjunctiva] : normal sclera/conjunctiva [EOMI] : extra ocular movement intact [No Proptosis] : no proptosis [Normal Oropharynx] : the oropharynx was normal [Thyroid Not Enlarged] : the thyroid was not enlarged [No Thyroid Nodules] : no palpable thyroid nodules [No Respiratory Distress] : no respiratory distress [No Accessory Muscle Use] : no accessory muscle use [Clear to Auscultation] : lungs were clear to auscultation bilaterally [Normal S1, S2] : normal S1 and S2 [Normal Rate] : heart rate was normal [Regular Rhythm] : with a regular rhythm [No Edema] : no peripheral edema [Pedal Pulses Normal] : the pedal pulses are present [Normal Bowel Sounds] : normal bowel sounds [Not Tender] : non-tender [Not Distended] : not distended [Soft] : abdomen soft [Normal Anterior Cervical Nodes] : no anterior cervical lymphadenopathy [No Spinal Tenderness] : no spinal tenderness [Spine Straight] : spine straight [No Stigmata of Cushings Syndrome] : no stigmata of Cushings Syndrome [Normal Gait] : normal gait [Normal Strength/Tone] : muscle strength and tone were normal [No Rash] : no rash [Acanthosis Nigricans] : no acanthosis nigricans [Right Foot Was Examined] : right foot ~C was examined [Normal Reflexes] : deep tendon reflexes were 2+ and symmetric [No Tremors] : no tremors [Oriented x3] : oriented to person, place, and time

## 2021-09-20 NOTE — HISTORY OF PRESENT ILLNESS
[FreeTextEntry1] : Patient is a 65-year-old pleasant 65-year-old male here for followup for his type 2 diabetes.  He was diagnosed with type 2 diabetes in 1990s.  \par \par He had an infection on the right 3rd toe, he is going to have surgery in Feb 2021.  He will need a surgical clearance letter from me.  In September, he had hand surgery for staph aureus infection. Healing very well.  Now seeing podiatrist every few months.  \par \par Patient has no known microvascular complication including CAD, CHF CVA in the past.\par \par Patient had a history of renal cancer, status post resection in April 2017.  He did not require any chemotherapy.  He follows up with .  \par \par Regarding hypertension, he is currently on Lisinopril 40mg daily states that his blood pressure is a little bit at home\par \par Regarding hyperlipidemia, patient currently takes the atorvastatin 40 mg daily.  Reports no myalgias or myopathy. \par \par Last visit, patient was found to have uncontrolled A1c of 9.2%, it was because there was a lot of birthdays during Nov and now.  He has been eating out a lot. His mother also passed away in Nov 2019.  She was 101.  He reports that he hasn't been adherent with diet.\par \par He also had an infection on his hand with Staph Aureus; he just had surgery.  He was told he had some calcification on the hand by another doctor, had a injection but thinks that might have introduced the injection.  \par \par Breakfast: eggs in the morning, sometimes crackers; another day he had a corn muffin. \par Lunch: he tries to have salad, chicken cutlets, crotons \par Dinner: He doesn't portion control for dinner.

## 2021-09-23 LAB
GLUCOSE BLDC GLUCOMTR-MCNC: 133
HBA1C MFR BLD HPLC: 6.5

## 2021-11-08 ENCOUNTER — OUTPATIENT (OUTPATIENT)
Dept: OUTPATIENT SERVICES | Facility: HOSPITAL | Age: 66
LOS: 1 days | Discharge: HOME | End: 2021-11-08

## 2021-11-08 DIAGNOSIS — Z90.5 ACQUIRED ABSENCE OF KIDNEY: Chronic | ICD-10-CM

## 2021-11-08 DIAGNOSIS — L89.899 PRESSURE ULCER OF OTHER SITE, UNSPECIFIED STAGE: ICD-10-CM

## 2021-11-09 DIAGNOSIS — Z97.3 PRESENCE OF SPECTACLES AND CONTACT LENSES: ICD-10-CM

## 2021-11-09 DIAGNOSIS — Z79.899 OTHER LONG TERM (CURRENT) DRUG THERAPY: ICD-10-CM

## 2021-11-09 DIAGNOSIS — M14.671 CHARCOT'S JOINT, RIGHT ANKLE AND FOOT: ICD-10-CM

## 2021-11-09 DIAGNOSIS — Z84.1 FAMILY HISTORY OF DISORDERS OF KIDNEY AND URETER: ICD-10-CM

## 2021-11-09 DIAGNOSIS — Z87.891 PERSONAL HISTORY OF NICOTINE DEPENDENCE: ICD-10-CM

## 2021-11-09 DIAGNOSIS — Z83.6 FAMILY HISTORY OF OTHER DISEASES OF THE RESPIRATORY SYSTEM: ICD-10-CM

## 2021-11-09 DIAGNOSIS — E11.621 TYPE 2 DIABETES MELLITUS WITH FOOT ULCER: ICD-10-CM

## 2021-11-09 DIAGNOSIS — H26.8 OTHER SPECIFIED CATARACT: ICD-10-CM

## 2021-11-09 DIAGNOSIS — Z82.49 FAMILY HISTORY OF ISCHEMIC HEART DISEASE AND OTHER DISEASES OF THE CIRCULATORY SYSTEM: ICD-10-CM

## 2021-11-09 DIAGNOSIS — E08.40 DIABETES MELLITUS DUE TO UNDERLYING CONDITION WITH DIABETIC NEUROPATHY, UNSPECIFIED: ICD-10-CM

## 2021-11-09 DIAGNOSIS — D64.9 ANEMIA, UNSPECIFIED: ICD-10-CM

## 2021-11-09 DIAGNOSIS — I10 ESSENTIAL (PRIMARY) HYPERTENSION: ICD-10-CM

## 2021-11-09 DIAGNOSIS — Z79.4 LONG TERM (CURRENT) USE OF INSULIN: ICD-10-CM

## 2021-11-09 DIAGNOSIS — A52.16 CHARCOT'S ARTHROPATHY (TABETIC): ICD-10-CM

## 2021-11-09 DIAGNOSIS — L97.518 NON-PRESSURE CHRONIC ULCER OF OTHER PART OF RIGHT FOOT WITH OTHER SPECIFIED SEVERITY: ICD-10-CM

## 2021-11-09 DIAGNOSIS — M19.071 PRIMARY OSTEOARTHRITIS, RIGHT ANKLE AND FOOT: ICD-10-CM

## 2021-11-15 ENCOUNTER — OUTPATIENT (OUTPATIENT)
Dept: OUTPATIENT SERVICES | Facility: HOSPITAL | Age: 66
LOS: 1 days | Discharge: HOME | End: 2021-11-15

## 2021-11-15 DIAGNOSIS — Z90.5 ACQUIRED ABSENCE OF KIDNEY: Chronic | ICD-10-CM

## 2021-11-15 DIAGNOSIS — L89.899 PRESSURE ULCER OF OTHER SITE, UNSPECIFIED STAGE: ICD-10-CM

## 2021-11-16 DIAGNOSIS — M19.071 PRIMARY OSTEOARTHRITIS, RIGHT ANKLE AND FOOT: ICD-10-CM

## 2021-11-16 DIAGNOSIS — H26.8 OTHER SPECIFIED CATARACT: ICD-10-CM

## 2021-11-16 DIAGNOSIS — I10 ESSENTIAL (PRIMARY) HYPERTENSION: ICD-10-CM

## 2021-11-16 DIAGNOSIS — A52.16 CHARCOT'S ARTHROPATHY (TABETIC): ICD-10-CM

## 2021-11-16 DIAGNOSIS — E08.40 DIABETES MELLITUS DUE TO UNDERLYING CONDITION WITH DIABETIC NEUROPATHY, UNSPECIFIED: ICD-10-CM

## 2021-11-16 DIAGNOSIS — E11.621 TYPE 2 DIABETES MELLITUS WITH FOOT ULCER: ICD-10-CM

## 2021-11-16 DIAGNOSIS — Z79.4 LONG TERM (CURRENT) USE OF INSULIN: ICD-10-CM

## 2021-11-16 DIAGNOSIS — Z79.899 OTHER LONG TERM (CURRENT) DRUG THERAPY: ICD-10-CM

## 2021-11-16 DIAGNOSIS — D64.9 ANEMIA, UNSPECIFIED: ICD-10-CM

## 2021-11-16 DIAGNOSIS — L97.511 NON-PRESSURE CHRONIC ULCER OF OTHER PART OF RIGHT FOOT LIMITED TO BREAKDOWN OF SKIN: ICD-10-CM

## 2021-11-29 ENCOUNTER — RX RENEWAL (OUTPATIENT)
Age: 66
End: 2021-11-29

## 2021-11-29 ENCOUNTER — OUTPATIENT (OUTPATIENT)
Dept: OUTPATIENT SERVICES | Facility: HOSPITAL | Age: 66
LOS: 1 days | Discharge: HOME | End: 2021-11-29

## 2021-11-29 DIAGNOSIS — L89.899 PRESSURE ULCER OF OTHER SITE, UNSPECIFIED STAGE: ICD-10-CM

## 2021-11-29 DIAGNOSIS — Z90.5 ACQUIRED ABSENCE OF KIDNEY: Chronic | ICD-10-CM

## 2021-11-30 DIAGNOSIS — D64.9 ANEMIA, UNSPECIFIED: ICD-10-CM

## 2021-11-30 DIAGNOSIS — Z79.4 LONG TERM (CURRENT) USE OF INSULIN: ICD-10-CM

## 2021-11-30 DIAGNOSIS — H26.8 OTHER SPECIFIED CATARACT: ICD-10-CM

## 2021-11-30 DIAGNOSIS — E11.9 TYPE 2 DIABETES MELLITUS WITHOUT COMPLICATIONS: ICD-10-CM

## 2021-11-30 DIAGNOSIS — A52.16 CHARCOT'S ARTHROPATHY (TABETIC): ICD-10-CM

## 2021-11-30 DIAGNOSIS — Z79.899 OTHER LONG TERM (CURRENT) DRUG THERAPY: ICD-10-CM

## 2021-11-30 DIAGNOSIS — M19.071 PRIMARY OSTEOARTHRITIS, RIGHT ANKLE AND FOOT: ICD-10-CM

## 2021-11-30 DIAGNOSIS — E08.40 DIABETES MELLITUS DUE TO UNDERLYING CONDITION WITH DIABETIC NEUROPATHY, UNSPECIFIED: ICD-10-CM

## 2021-11-30 DIAGNOSIS — I10 ESSENTIAL (PRIMARY) HYPERTENSION: ICD-10-CM

## 2022-01-06 ENCOUNTER — RX RENEWAL (OUTPATIENT)
Age: 67
End: 2022-01-06

## 2022-03-21 NOTE — ASU PREOP CHECKLIST - SIDE RAILS UP
methylphenidate (RITALIN LA) 40 MG 24 hr capsule 30 capsule 0 3/4/2022     Sig: TAKE ONE CAPSULE BY MOUTH EVERY DAY IN THE MORNING.      Not due for refill until 4.4.22  
Last office visit date: 10/19/21  Due for follow up as provider indicated: 4/19/22, pending appointment 4/11/22  Medication status as per last visit note: Continue methylphenidate  LA cap 40 mg in the morning and 20 mg tab  at p.m.   Last prescribed: 3/14/22    Dr. Hawthorne - please advise on refill request.  
n/a

## 2022-03-22 ENCOUNTER — APPOINTMENT (OUTPATIENT)
Dept: ENDOCRINOLOGY | Facility: CLINIC | Age: 67
End: 2022-03-22
Payer: COMMERCIAL

## 2022-03-22 VITALS
HEIGHT: 70 IN | TEMPERATURE: 97.8 F | OXYGEN SATURATION: 99 % | DIASTOLIC BLOOD PRESSURE: 90 MMHG | WEIGHT: 251 LBS | HEART RATE: 93 BPM | SYSTOLIC BLOOD PRESSURE: 170 MMHG | BODY MASS INDEX: 35.93 KG/M2

## 2022-03-22 LAB
GLUCOSE BLDC GLUCOMTR-MCNC: 189
HBA1C MFR BLD HPLC: 6.6

## 2022-03-22 PROCEDURE — 99215 OFFICE O/P EST HI 40 MIN: CPT | Mod: 25

## 2022-03-22 PROCEDURE — 95251 CONT GLUC MNTR ANALYSIS I&R: CPT

## 2022-03-22 PROCEDURE — 82962 GLUCOSE BLOOD TEST: CPT

## 2022-03-22 PROCEDURE — 83036 HEMOGLOBIN GLYCOSYLATED A1C: CPT | Mod: QW

## 2022-03-22 NOTE — ASSESSMENT
[Diabetes Foot Care] : diabetes foot care [Long Term Vascular Complications] : long term vascular complications of diabetes [Carbohydrate Consistent Diet] : carbohydrate consistent diet [Importance of Diet and Exercise] : importance of diet and exercise to improve glycemic control, achieve weight loss and improve cardiovascular health [Hypoglycemia Management] : hypoglycemia management [Action and use of Insulin] : action and use of short and long-acting insulin [Self Monitoring of Blood Glucose] : self monitoring of blood glucose [Insulin Self-Administration] : insulin self-administration [Injection Technique, Storage, Sharps Disposal] : injection technique, storage, and sharps disposal [Retinopathy Screening] : Patient was referred to ophthalmology for retinopathy screening [FreeTextEntry1] : 66-year-old male with history of uncontrolled type 2 diabetes, hypertension, hyperlipidemia, here for endocrinology follow-up.\par \par 1.  Type 2 diabetes\par A1c 6.5% 9/20/2021 -->6.6% today, at goal\par Continue basaglar 30 units QHS\par Adjust humalog 12/15/12 units with meals. Reduce to 12 units for all meals if premeal sugars <100s\par Will continue with metformin 1000mg bid \par With history of diabetic ulcer therefore I do not feel comfortable giving patient SGLT2 inhibitor.\par He had side effect with GLP1 RA, had tried Trulicity but was having side effects.  \par We discussed the importance of dietary changes, doing better overall.\par Reviewed treatment of hypoglycemia\par He is to call if there's any high or low glucose <70 or above 250mg/dl.  \par Opthal: UTD 3/2022\par Podiatry: He is following up with podiatrist. Monofilament exam performed 3/22/22 showing reducing sensation bilaterally.\par \par 2.  Hyperlipidemia\par LDL was checked in March 10, 2021, 60 mg/dL\par Recommend to continue with atorvastatin 40mg daily.  \par Check lipid panel today\par \par 3.  Hypertension: \par Blood pressure today elevated 170/90. Reported high at home. Recommended additional agent such as beta blocker or CCB (pt may have had issue with amlodipine but unable to confirm). Pt prefers to monitor at home first and work on diet/exercise.\par Continue to monitor at home. \par Currently on lisinopril 40 mg once daily\par will check microalbumin/creatinine ratio today\par EGFR 68 mg/min 3/10/2021\par \par Follow up with Dr. Suarez in 6 months [Exercise/Effect on Glucose] : exercise/effect on glucose [Weight Loss] : weight loss

## 2022-03-22 NOTE — HISTORY OF PRESENT ILLNESS
[FreeTextEntry1] : Patient is a 65-year-old M here for followup for his type 2 diabetes.  He was diagnosed with type 2 diabetes in 1990s.  \par \par He had an infection on the right 3rd toe, he had surgery in Feb 2021.  No new foot issues. Now seeing podiatrist every few months.  \par Patient has no known microvascular complication including CAD, CHF CVA in the past.\par Ophtho: UTD 3/2022, no DR, was having floaters in the past which improved but still sees "spider webs", has early cataracts.\par \par A1c 6.5 in 9/2021-->6.6% today\par \par Current DM regimen:\par Basaglar 30 units at bedtime \par Humalog 15 units tid with meals. \par metformin 1000mg bid \par \par Had GI upset with trulicity and was too expensive.\par With history of diabetic ulcer therefore I do not feel comfortable giving patient SGLT2 inhibitor.\par \par Dexcom\par 3/9-3/22/22\par 4% very high\par 26% high\par 69% in range\par <1% low\par 0% very low \par GMI 7.2%\par SD 45\par no hypoglycemia in last two weeks. Some mealtime hyperglycemia mid day - noted pt injecting after eating on some days\par \par Reports a lot lows in afternoon, with blurry vision.\par \par Regarding hypertension, elevated today he is currently on Lisinopril 40mg daily states that his blood pressure is a little bit better at home, but recently 150s/80s. Reports coughing with a 5 mg tab but cannot remember the name\par \par Regarding hyperlipidemia, patient currently takes the atorvastatin 40 mg daily.  Reports no myalgias or myopathy. \par \par Breakfast: eggs in the morning, sometimes crackers; another day he had a corn muffin. \par Lunch: he tries to have salad, chicken cutlets, crotons \par Dinner: He doesn't portion control for dinner. \par \par Patient had a history of renal cancer, status post resection in April 2017.  He did not require any chemotherapy.  He follows up with .  \par \par

## 2022-03-22 NOTE — PHYSICAL EXAM
[Alert] : alert [Well Nourished] : well nourished [No Acute Distress] : no acute distress [Well Developed] : well developed [Normal Sclera/Conjunctiva] : normal sclera/conjunctiva [EOMI] : extra ocular movement intact [No Proptosis] : no proptosis [Normal Oropharynx] : the oropharynx was normal [Thyroid Not Enlarged] : the thyroid was not enlarged [No Thyroid Nodules] : no palpable thyroid nodules [No Respiratory Distress] : no respiratory distress [No Accessory Muscle Use] : no accessory muscle use [Clear to Auscultation] : lungs were clear to auscultation bilaterally [Normal S1, S2] : normal S1 and S2 [Normal Rate] : heart rate was normal [Regular Rhythm] : with a regular rhythm [No Edema] : no peripheral edema [Pedal Pulses Normal] : the pedal pulses are present [Normal Bowel Sounds] : normal bowel sounds [Not Tender] : non-tender [Not Distended] : not distended [Soft] : abdomen soft [Normal Anterior Cervical Nodes] : no anterior cervical lymphadenopathy [No Spinal Tenderness] : no spinal tenderness [Spine Straight] : spine straight [No Stigmata of Cushings Syndrome] : no stigmata of Cushings Syndrome [Normal Gait] : normal gait [Normal Strength/Tone] : muscle strength and tone were normal [No Rash] : no rash [No Tremors] : no tremors [Oriented x3] : oriented to person, place, and time [No Involuntary Movements] : no involuntary movements were seen [Acanthosis Nigricans] : no acanthosis nigricans [Foot Ulcers] : no foot ulcers [Right foot was examined, including] : right foot ~C was examined, including visual inspection with sensory and pulse exams [Left foot was examined, including] : left foot ~C was examined, including visual inspection with sensory and pulse exams [Delayed in the Right Toes] : normal in the toes [Normal] : normal [Delayed in the Left Toes] : normal in the toes [Vibration Dec.] : normal vibratory sensation at the level of the toes [Diminished Throughout Both Feet] : diminished tactile sensation with monofilament testing throughout both feet [#2 Diminished] : number 2 was diminished [#3 Diminished] : number 3 was diminished [#1 Diminished] : number 1 was diminished [#4 Diminished] : number 4 was diminished [#5 Diminished] : number 5 was diminished [Normal Affect] : the affect was normal [Normal Insight/Judgement] : insight and judgment were intact

## 2022-03-23 ENCOUNTER — NON-APPOINTMENT (OUTPATIENT)
Age: 67
End: 2022-03-23

## 2022-03-23 LAB
ALBUMIN SERPL ELPH-MCNC: 4.8 G/DL
ALP BLD-CCNC: 92 U/L
ALT SERPL-CCNC: 20 U/L
ANION GAP SERPL CALC-SCNC: 13 MMOL/L
AST SERPL-CCNC: 15 U/L
BILIRUB SERPL-MCNC: 0.9 MG/DL
BUN SERPL-MCNC: 21 MG/DL
CALCIUM SERPL-MCNC: 10.1 MG/DL
CHLORIDE SERPL-SCNC: 101 MMOL/L
CHOLEST SERPL-MCNC: 118 MG/DL
CO2 SERPL-SCNC: 26 MMOL/L
CREAT SERPL-MCNC: 1.15 MG/DL
EGFR: 70 ML/MIN/1.73M2
GLUCOSE SERPL-MCNC: 161 MG/DL
HDLC SERPL-MCNC: 33 MG/DL
LDLC SERPL CALC-MCNC: 56 MG/DL
NONHDLC SERPL-MCNC: 85 MG/DL
POTASSIUM SERPL-SCNC: 4.6 MMOL/L
PROT SERPL-MCNC: 7.8 G/DL
SODIUM SERPL-SCNC: 141 MMOL/L
TRIGL SERPL-MCNC: 143 MG/DL
VIT B12 SERPL-MCNC: 489 PG/ML

## 2022-03-25 ENCOUNTER — APPOINTMENT (OUTPATIENT)
Dept: INTERNAL MEDICINE | Facility: CLINIC | Age: 67
End: 2022-03-25

## 2022-03-28 ENCOUNTER — RX RENEWAL (OUTPATIENT)
Age: 67
End: 2022-03-28

## 2022-04-10 ENCOUNTER — RX RENEWAL (OUTPATIENT)
Age: 67
End: 2022-04-10

## 2022-04-11 PROBLEM — Z11.59 SCREENING FOR VIRAL DISEASE: Status: ACTIVE | Noted: 2020-05-18

## 2022-05-09 RX ORDER — PEN NEEDLE, DIABETIC 29 G X1/2"
31G X 5 MM NEEDLE, DISPOSABLE MISCELLANEOUS
Qty: 4 | Refills: 3 | Status: DISCONTINUED | COMMUNITY
Start: 2019-08-15 | End: 2022-05-09

## 2022-05-10 ENCOUNTER — RX RENEWAL (OUTPATIENT)
Age: 67
End: 2022-05-10

## 2022-05-11 ENCOUNTER — RX RENEWAL (OUTPATIENT)
Age: 67
End: 2022-05-11

## 2022-06-28 ENCOUNTER — RX RENEWAL (OUTPATIENT)
Age: 67
End: 2022-06-28

## 2022-07-28 ENCOUNTER — RX RENEWAL (OUTPATIENT)
Age: 67
End: 2022-07-28

## 2022-08-02 ENCOUNTER — RX RENEWAL (OUTPATIENT)
Age: 67
End: 2022-08-02

## 2022-08-22 ENCOUNTER — RX RENEWAL (OUTPATIENT)
Age: 67
End: 2022-08-22

## 2022-09-13 ENCOUNTER — RX RENEWAL (OUTPATIENT)
Age: 67
End: 2022-09-13

## 2022-09-20 ENCOUNTER — RX RENEWAL (OUTPATIENT)
Age: 67
End: 2022-09-20

## 2022-09-22 ENCOUNTER — APPOINTMENT (OUTPATIENT)
Dept: ENDOCRINOLOGY | Facility: CLINIC | Age: 67
End: 2022-09-22

## 2022-09-22 VITALS
OXYGEN SATURATION: 95 % | WEIGHT: 250 LBS | HEIGHT: 70 IN | HEART RATE: 101 BPM | DIASTOLIC BLOOD PRESSURE: 90 MMHG | SYSTOLIC BLOOD PRESSURE: 140 MMHG | BODY MASS INDEX: 35.79 KG/M2 | TEMPERATURE: 98.1 F

## 2022-09-22 LAB
GLUCOSE BLDC GLUCOMTR-MCNC: 97
HBA1C MFR BLD HPLC: 6.6

## 2022-09-22 PROCEDURE — 82962 GLUCOSE BLOOD TEST: CPT

## 2022-09-22 PROCEDURE — 99214 OFFICE O/P EST MOD 30 MIN: CPT | Mod: 25

## 2022-09-22 PROCEDURE — 83036 HEMOGLOBIN GLYCOSYLATED A1C: CPT | Mod: QW

## 2022-09-22 PROCEDURE — 95251 CONT GLUC MNTR ANALYSIS I&R: CPT

## 2022-09-22 NOTE — ASSESSMENT
[Diabetes Foot Care] : diabetes foot care [Long Term Vascular Complications] : long term vascular complications of diabetes [Carbohydrate Consistent Diet] : carbohydrate consistent diet [Importance of Diet and Exercise] : importance of diet and exercise to improve glycemic control, achieve weight loss and improve cardiovascular health [Exercise/Effect on Glucose] : exercise/effect on glucose [Hypoglycemia Management] : hypoglycemia management [Action and use of Insulin] : action and use of short and long-acting insulin [Self Monitoring of Blood Glucose] : self monitoring of blood glucose [Insulin Self-Administration] : insulin self-administration [Injection Technique, Storage, Sharps Disposal] : injection technique, storage, and sharps disposal [Retinopathy Screening] : Patient was referred to ophthalmology for retinopathy screening [Weight Loss] : weight loss [FreeTextEntry1] : 67-year-old male with history of uncontrolled type 2 diabetes, hypertension, hyperlipidemia, here for endocrinology follow-up.\par \par 1.  Type 2 diabetes\par A1c 6.6%\par increase basaglar 24 units QHS\par Adjust humalog 10 units with meals. To inject premeals. Start scale 1:50 >150\par Will continue with metformin 1000mg bid \par With history of diabetic ulcer therefore I do not feel comfortable giving patient SGLT2 inhibitor.\par He had side effect with GLP1 RA, had tried Trulicity but was having side effects.  \par We discussed the importance of dietary changes, doing better overall. Limited exercise now due to podiatry issues\par Reviewed treatment of hypoglycemia\par He is to call if there's any high or low glucose <70 or above 250mg/dl.  \par Opthal: UTD 3/2022\par Podiatry: He is following up with podiatrist. Monofilament exam performed 3/22/22 showing reducing sensation bilaterally.\par -declines flu shot today\par 2.  Hyperlipidemia\par LDL 56 in 3/2022\par Recommend to continue with atorvastatin 40mg daily.  \par \par 3.  Hypertension: \par Blood pressure today. Reported high at home. Recommended additional agent - had reaction to CCB (pt may have had issue with amlodipine but unable to confirm).\par Continue to monitor at home. \par Currently on lisinopril 40 mg once daily\par Start metoprolol ER 25 mg daily \par will check microalbumin/creatinine ratio today\par \par Follow up in 6 months

## 2022-09-22 NOTE — HISTORY OF PRESENT ILLNESS
[FreeTextEntry1] : Patient is a 67-year-old M here for followup for his type 2 diabetes.  He was diagnosed with type 2 diabetes in 1990s.  \par \par He had an infection on the right 3rd toe, he had surgery in Feb 2021.  No new foot issues. Now seeing podiatrist every few months. \par Patient has no known microvascular complication including CAD, CHF CVA in the past.\par Ophtho: UTD 3/2022, no DR, was having floaters in the past which improved but still sees "spider webs", has early cataracts. Seeing optho regularly.\par Seeing podiatry. UTD. Had issue with bottom of toes after walking a lot on cruise.\par \par A1c 6.5 in 9/2021-->6.6% ->6.6 today\par \par Current DM regimen:\par basaglar 20 units QHS\par humalog 15/12/15 units with meals\par metformin 1000mg bid \par has been injecting humalog after meals if sugars are lower\par \par Had GI upset with trulicity and was too expensive.\par With history of diabetic ulcer therefore I do not feel comfortable giving patient SGLT2 inhibitor.\par \par Dexcom\par 9/9/22-9/22/22\par 4% very high\par 38% high\par 56% in range\par 1% low\par <1% very low \par GMI 7.4%\par SD 48\par postprandial hyperglycemia\par am hyperglycemia\par \par Regarding hypertension: 140/90 today. on Lisinopril 40mg daily states that his blood pressure is a little bit better at home. Reports coughing with a 5 mg tab but cannot remember the name\par \par Regarding hyperlipidemia, patient currently takes the atorvastatin 40 mg daily.  Reports no myalgias or myopathy. \par \par Breakfast: eggs in the morning, sometimes crackers; another day he had a corn muffin. \par Lunch: he tries to have salad, chicken cutlets, crotons \par Dinner: He doesn't portion control for dinner. \par \par Patient had a history of renal cancer, status post resection in April 2017.  He did not require any chemotherapy.  He follows up with .  \par \par

## 2022-09-22 NOTE — PHYSICAL EXAM
[Alert] : alert [Well Nourished] : well nourished [No Acute Distress] : no acute distress [Well Developed] : well developed [Normal Sclera/Conjunctiva] : normal sclera/conjunctiva [EOMI] : extra ocular movement intact [No Proptosis] : no proptosis [Normal Oropharynx] : the oropharynx was normal [Thyroid Not Enlarged] : the thyroid was not enlarged [No Thyroid Nodules] : no palpable thyroid nodules [No Respiratory Distress] : no respiratory distress [No Accessory Muscle Use] : no accessory muscle use [Clear to Auscultation] : lungs were clear to auscultation bilaterally [Normal S1, S2] : normal S1 and S2 [Normal Rate] : heart rate was normal [Regular Rhythm] : with a regular rhythm [No Edema] : no peripheral edema [Pedal Pulses Normal] : the pedal pulses are present [Normal Bowel Sounds] : normal bowel sounds [Not Tender] : non-tender [Not Distended] : not distended [Soft] : abdomen soft [Normal Anterior Cervical Nodes] : no anterior cervical lymphadenopathy [No Spinal Tenderness] : no spinal tenderness [Spine Straight] : spine straight [No Stigmata of Cushings Syndrome] : no stigmata of Cushings Syndrome [Normal Gait] : normal gait [No Involuntary Movements] : no involuntary movements were seen [Normal Strength/Tone] : muscle strength and tone were normal [No Rash] : no rash [Right foot was examined, including] : right foot ~C was examined, including visual inspection with sensory and pulse exams [Left foot was examined, including] : left foot ~C was examined, including visual inspection with sensory and pulse exams [Normal] : normal [Diminished Throughout Both Feet] : diminished tactile sensation with monofilament testing throughout both feet [#2 Diminished] : number 2 was diminished [#3 Diminished] : number 3 was diminished [#1 Diminished] : number 1 was diminished [#4 Diminished] : number 4 was diminished [#5 Diminished] : number 5 was diminished [No Tremors] : no tremors [Oriented x3] : oriented to person, place, and time [Normal Affect] : the affect was normal [Normal Insight/Judgement] : insight and judgment were intact [Acanthosis Nigricans] : no acanthosis nigricans [Foot Ulcers] : no foot ulcers [Delayed in the Right Toes] : normal in the toes [Delayed in the Left Toes] : normal in the toes [Vibration Dec.] : normal vibratory sensation at the level of the toes

## 2022-09-27 ENCOUNTER — NON-APPOINTMENT (OUTPATIENT)
Age: 67
End: 2022-09-27

## 2022-09-27 LAB
CREAT SPEC-SCNC: 112 MG/DL
MICROALBUMIN 24H UR DL<=1MG/L-MCNC: 39.9 MG/DL
MICROALBUMIN/CREAT 24H UR-RTO: 357 MG/G

## 2022-11-30 ENCOUNTER — RX RENEWAL (OUTPATIENT)
Age: 67
End: 2022-11-30

## 2022-12-08 NOTE — ASU PATIENT PROFILE, ADULT - PRO MENTAL HEALTH SX RECENT
Tele call #1 to patient to inform her that insurance company will not authorize payment for MRI lumbar and thoracic spine without adequate PT trial.  Unable to leave message, \"the Mailbox is full and can not accept messages.\"    Inform patient that service to pt order has been placed and the PT dept will call her to schedule appt.   none

## 2022-12-13 ENCOUNTER — APPOINTMENT (OUTPATIENT)
Dept: NEPHROLOGY | Facility: CLINIC | Age: 67
End: 2022-12-13

## 2022-12-21 NOTE — ASU PATIENT PROFILE, ADULT - PRO ARRIVE FROM
Alert and oriented to person, place, time/situation. normal mood and affect. no apparent risk to self or others.
home

## 2022-12-27 ENCOUNTER — NON-APPOINTMENT (OUTPATIENT)
Age: 67
End: 2022-12-27

## 2023-01-12 ENCOUNTER — RX RENEWAL (OUTPATIENT)
Age: 68
End: 2023-01-12

## 2023-01-24 ENCOUNTER — APPOINTMENT (OUTPATIENT)
Dept: UROLOGY | Facility: CLINIC | Age: 68
End: 2023-01-24

## 2023-01-24 ENCOUNTER — APPOINTMENT (OUTPATIENT)
Dept: UROLOGY | Facility: CLINIC | Age: 68
End: 2023-01-24
Payer: COMMERCIAL

## 2023-01-24 ENCOUNTER — OUTPATIENT (OUTPATIENT)
Dept: OUTPATIENT SERVICES | Facility: HOSPITAL | Age: 68
LOS: 1 days | End: 2023-01-24
Payer: COMMERCIAL

## 2023-01-24 VITALS
HEIGHT: 70 IN | BODY MASS INDEX: 35.65 KG/M2 | WEIGHT: 249 LBS | SYSTOLIC BLOOD PRESSURE: 163 MMHG | RESPIRATION RATE: 17 BRPM | TEMPERATURE: 97.3 F | DIASTOLIC BLOOD PRESSURE: 77 MMHG | HEART RATE: 84 BPM

## 2023-01-24 DIAGNOSIS — R35.0 FREQUENCY OF MICTURITION: ICD-10-CM

## 2023-01-24 DIAGNOSIS — Z90.5 ACQUIRED ABSENCE OF KIDNEY: Chronic | ICD-10-CM

## 2023-01-24 PROCEDURE — 76775 US EXAM ABDO BACK WALL LIM: CPT

## 2023-01-24 PROCEDURE — 76775 US EXAM ABDO BACK WALL LIM: CPT | Mod: 26

## 2023-01-24 PROCEDURE — 99213 OFFICE O/P EST LOW 20 MIN: CPT

## 2023-01-24 NOTE — PHYSICAL EXAM
[General Appearance - Well Developed] : well developed [General Appearance - Well Nourished] : well nourished [Abdomen Soft] : soft [FreeTextEntry1] : PVR  [] : no respiratory distress [Exaggerated Use Of Accessory Muscles For Inspiration] : no accessory muscle use [Oriented To Time, Place, And Person] : oriented to person, place, and time [Not Anxious] : not anxious [Normal Station and Gait] : the gait and station were normal for the patient's age

## 2023-01-24 NOTE — HISTORY OF PRESENT ILLNESS
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 1.6cm cyst on right kidney with one thin septation\par Present on CT from 2021\par \par Pt asymptomatic\par No hematuria\par Notes urinary frequency\par \par PVR 350cc --> double void --> 230cc

## 2023-01-24 NOTE — ASSESSMENT
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 1.6cm cyst on right kidney with one thin septation\par Present on CT from 2021\par \par PVR 350cc --> double void --> 230cc\par \par - will repeat CT chest\par - US renal in 1 year\par - will start tamsulosin 0.4mg qd and finasteride 5mg qd\par - followup in 6 months

## 2023-01-25 DIAGNOSIS — N28.89 OTHER SPECIFIED DISORDERS OF KIDNEY AND URETER: ICD-10-CM

## 2023-01-25 DIAGNOSIS — N40.1 BENIGN PROSTATIC HYPERPLASIA WITH LOWER URINARY TRACT SYMPTOMS: ICD-10-CM

## 2023-01-25 LAB — BACTERIA UR CULT: NORMAL

## 2023-01-31 ENCOUNTER — APPOINTMENT (OUTPATIENT)
Dept: CT IMAGING | Facility: IMAGING CENTER | Age: 68
End: 2023-01-31
Payer: COMMERCIAL

## 2023-01-31 ENCOUNTER — OUTPATIENT (OUTPATIENT)
Dept: OUTPATIENT SERVICES | Facility: HOSPITAL | Age: 68
LOS: 1 days | End: 2023-01-31
Payer: COMMERCIAL

## 2023-01-31 DIAGNOSIS — Z90.5 ACQUIRED ABSENCE OF KIDNEY: Chronic | ICD-10-CM

## 2023-01-31 DIAGNOSIS — N28.89 OTHER SPECIFIED DISORDERS OF KIDNEY AND URETER: ICD-10-CM

## 2023-01-31 PROCEDURE — 71250 CT THORAX DX C-: CPT | Mod: 26

## 2023-01-31 PROCEDURE — 71250 CT THORAX DX C-: CPT

## 2023-03-28 ENCOUNTER — APPOINTMENT (OUTPATIENT)
Dept: ENDOCRINOLOGY | Facility: CLINIC | Age: 68
End: 2023-03-28
Payer: COMMERCIAL

## 2023-03-28 VITALS
DIASTOLIC BLOOD PRESSURE: 74 MMHG | OXYGEN SATURATION: 97 % | WEIGHT: 247 LBS | BODY MASS INDEX: 35.36 KG/M2 | SYSTOLIC BLOOD PRESSURE: 160 MMHG | HEART RATE: 87 BPM | HEIGHT: 70 IN

## 2023-03-28 LAB — HBA1C MFR BLD HPLC: 6.4

## 2023-03-28 PROCEDURE — 99214 OFFICE O/P EST MOD 30 MIN: CPT | Mod: 25

## 2023-03-28 PROCEDURE — 83036 HEMOGLOBIN GLYCOSYLATED A1C: CPT | Mod: QW

## 2023-03-28 RX ORDER — INSULIN DEGLUDEC INJECTION 100 U/ML
100 INJECTION, SOLUTION SUBCUTANEOUS AT BEDTIME
Qty: 5 | Refills: 3 | Status: DISCONTINUED | COMMUNITY
Start: 2021-03-29 | End: 2023-03-28

## 2023-03-28 NOTE — HISTORY OF PRESENT ILLNESS
[FreeTextEntry1] : Patient is a 67-year-old M here for followup for his type 2 diabetes.  He was diagnosed with type 2 diabetes in 1990s.  \par \par He had an infection on the right 3rd toe, he had surgery in Feb 2021.  No new foot issues. Now seeing podiatrist every few months. \par Patient has no known microvascular complication including CAD, CHF CVA in the past.\par Ophtho: UTD 1/2023, thinks DR?, was having floaters in the past which improved but still sees "spider webs", has early cataracts. Seeing optho regularly.\par Seeing podiatry. UTD. no active issues now \par going on a cruise soon - [place], [place] \par \par A1c 6.5 in 9/2021-->6.6% ->6.6 %-->\par \par Current DM regimen:\par basaglar 24 units QHS\par humalog 15 units with meals\par metformin 1000mg bid \par has been injecting humalog after meals if sugars are lower <120\par \par Had GI upset with trulicity and was too expensive.\par With history of diabetic ulcer so was avoiding SGLTi for now \par \par Dexcom - has only been wearing 5 days due to delayed refill \par \par \par Regarding hypertension: 160/73 today. on Lisinopril 40mg daily states that his blood pressure is a little bit better at home. \par \par Regarding hyperlipidemia, patient currently takes the atorvastatin 40 mg daily.  Reports no myalgias or myopathy. \par \par Breakfast: eggs in the morning, sometimes crackers; another day he had a corn muffin. \par Lunch: he tries to have salad, chicken cutlets, crotons \par Dinner: He doesn't portion control for dinner. \par \par Patient had a history of renal cancer, status post resection in April 2017.  He did not require any chemotherapy.  He follows up with .  \par \par

## 2023-03-28 NOTE — ASSESSMENT
[Diabetes Foot Care] : diabetes foot care [Long Term Vascular Complications] : long term vascular complications of diabetes [Carbohydrate Consistent Diet] : carbohydrate consistent diet [Importance of Diet and Exercise] : importance of diet and exercise to improve glycemic control, achieve weight loss and improve cardiovascular health [Exercise/Effect on Glucose] : exercise/effect on glucose [Hypoglycemia Management] : hypoglycemia management [Action and use of Insulin] : action and use of short and long-acting insulin [Self Monitoring of Blood Glucose] : self monitoring of blood glucose [Insulin Self-Administration] : insulin self-administration [Injection Technique, Storage, Sharps Disposal] : injection technique, storage, and sharps disposal [Retinopathy Screening] : Patient was referred to ophthalmology for retinopathy screening [Weight Loss] : weight loss [FreeTextEntry1] : 67-year-old male with history of uncontrolled type 2 diabetes, hypertension, hyperlipidemia, here for endocrinology follow-up.\par \par 1.  Type 2 diabetes\par A1c 6.6-->6.4%\par unable to download dexcom today - no data. Will schedule in 4 weeks to review data on mounjaro/\par continue basaglar 24 units QHS\par humalog 15 units with meals. To inject premeals. can use scale 1:50 >150\par Will continue with metformin 1000mg bid \par With history of diabetic ulcer therefore I do not feel comfortable giving patient SGLT2 inhibitor.\par start mounjaro 2.5 mg weekly. No hx of pancreatitis, medullary thyroid cancer or MEN syndrome in self or family, Discussed risk of worsening retinopathy with rapid glucose control. Discussed side effects of GI upset and association with the aforementioned issues. \par Provided 20% reduction in doses plan if dropping glucoses on mounjaro. Wrote out instructions today.\par We discussed the importance of dietary changes, doing better overall. Limited exercise now due to podiatry issues\par Reviewed treatment of hypoglycemia\par He is to call if there's any high or low glucose <70 or above 250mg/dl.  \par Opthal: UTD 1/2023\par Podiatry: He is following up with podiatrist. Monofilament exam performed 3/2023 showing reducing sensation bilaterally.\par -declines flu shot last visit \par \par 2.  Hyperlipidemia\par LDL 56 in 3/2022\par Recommend to continue with atorvastatin 40mg daily.  \par \par 3.  Hypertension: \par Blood pressure high again today. Recommended additional agent - had reaction to CCB (pt may have had issue with amlodipine but unable to confirm).\par Continue to monitor at home. \par Currently on lisinopril 40 mg once daily\par Start metoprolol ER 25 mg daily, sent again today\par will check microalbumin/creatinine ratio again today today\par \par 4. Macroalbuminuria \par on max lisinopril dosing\par previously not on SGLT2i given hx of foot ulcers (no active issue) - (avoid canagliflozin since studies showed association with increased risk of amputation).\par refer to renal again\par \par Follow up in 6 months

## 2023-04-04 LAB
ALBUMIN SERPL ELPH-MCNC: 4.7 G/DL
ALP BLD-CCNC: 93 U/L
ALT SERPL-CCNC: 17 U/L
ANION GAP SERPL CALC-SCNC: 14 MMOL/L
AST SERPL-CCNC: 15 U/L
BILIRUB SERPL-MCNC: 0.9 MG/DL
BUN SERPL-MCNC: 25 MG/DL
CALCIUM SERPL-MCNC: 10.7 MG/DL
CHLORIDE SERPL-SCNC: 103 MMOL/L
CHOLEST SERPL-MCNC: 116 MG/DL
CO2 SERPL-SCNC: 26 MMOL/L
CREAT SERPL-MCNC: 1.18 MG/DL
CREAT SPEC-SCNC: 56 MG/DL
EGFR: 68 ML/MIN/1.73M2
GLUCOSE SERPL-MCNC: 110 MG/DL
HDLC SERPL-MCNC: 35 MG/DL
LDLC SERPL CALC-MCNC: 62 MG/DL
MICROALBUMIN 24H UR DL<=1MG/L-MCNC: 25.8 MG/DL
MICROALBUMIN/CREAT 24H UR-RTO: 464 MG/G
NONHDLC SERPL-MCNC: 81 MG/DL
POTASSIUM SERPL-SCNC: 5 MMOL/L
PROT SERPL-MCNC: 7.5 G/DL
SODIUM SERPL-SCNC: 143 MMOL/L
TRIGL SERPL-MCNC: 91 MG/DL
VIT B12 SERPL-MCNC: 628 PG/ML

## 2023-04-25 ENCOUNTER — TRANSCRIPTION ENCOUNTER (OUTPATIENT)
Age: 68
End: 2023-04-25

## 2023-04-25 ENCOUNTER — APPOINTMENT (OUTPATIENT)
Dept: ENDOCRINOLOGY | Facility: CLINIC | Age: 68
End: 2023-04-25
Payer: COMMERCIAL

## 2023-04-25 VITALS
WEIGHT: 249 LBS | SYSTOLIC BLOOD PRESSURE: 150 MMHG | OXYGEN SATURATION: 95 % | HEIGHT: 70 IN | BODY MASS INDEX: 35.65 KG/M2 | HEART RATE: 94 BPM | DIASTOLIC BLOOD PRESSURE: 90 MMHG

## 2023-04-25 PROCEDURE — 99214 OFFICE O/P EST MOD 30 MIN: CPT | Mod: 25

## 2023-04-25 PROCEDURE — 95251 CONT GLUC MNTR ANALYSIS I&R: CPT

## 2023-04-25 NOTE — ASSESSMENT
[Diabetes Foot Care] : diabetes foot care [Long Term Vascular Complications] : long term vascular complications of diabetes [Carbohydrate Consistent Diet] : carbohydrate consistent diet [Importance of Diet and Exercise] : importance of diet and exercise to improve glycemic control, achieve weight loss and improve cardiovascular health [Exercise/Effect on Glucose] : exercise/effect on glucose [Hypoglycemia Management] : hypoglycemia management [Action and use of Insulin] : action and use of short and long-acting insulin [Self Monitoring of Blood Glucose] : self monitoring of blood glucose [Insulin Self-Administration] : insulin self-administration [Injection Technique, Storage, Sharps Disposal] : injection technique, storage, and sharps disposal [Retinopathy Screening] : Patient was referred to ophthalmology for retinopathy screening [Weight Loss] : weight loss [FreeTextEntry1] : 67-year-old male with history of uncontrolled type 2 diabetes, hypertension, hyperlipidemia, here for endocrinology follow-up.\par \par 1.  Type 2 diabetes\par 3/28/2023 A1c 6.4%.\par Dexcom is downloaded and review is as follows:\par He is taking humalog sometimes after lunch and then AFTER dinner.  \par Time in range 79%, 18% high, 2% very high, less than 1% low\par The patient is still not always taking his insulin before meals, he often states that he takes his Humalog after lunch and then will hold his predinner dose, and then will take his dinner Humalog after dinner at bedtime.\par I discussed with the patient extensively that I do not recommend doing this.\par -- I would currently recommend to continue the current regimen as the patient is still dosing insulin after meals, I am unable to differentiate whether his a.m. numbers are within goal because he is taking Humalog at bedtime or whether his Basaglar dose is appropriate.\par \par Plan:\par Basaglar 24 units nightly\par Humalog 15 units dose PRE-meals. \par  metformin 1000 mg twice daily\par Mounjaro 2.5 mg weekly\par \par 5/21/23:   After the patient returns from vacation:\par Start Mounarjo 5 mg once weekly. \par Decrease Basaglar to 20 units at bedtime. \par Decrease Humalog to 12 units pre-meals. \par Continue Metformin 100 mg BID. \par [facility]   [facility]/23/2023 at 1 PM.\par \par We are presently avoiding SGLT2 inhibitors in this patient with lower extremity ulcer and history of osteomyelitis.\par \par No hx of pancreatitis, medullary thyroid cancer or MEN syndrome in self or family, Discussed risk of worsening retinopathy with rapid glucose control. Discussed side effects of GI upset and association with the aforementioned issues. \par \par We discussed the importance of dietary changes, doing better overall. Limited exercise now due to podiatry issues\par Reviewed treatment of hypoglycemia\par He is to call if there's any high or low glucose <70 or above 250mg/dl.  \par Opthal: UTD 1/2023\par Podiatry: He is following up with podiatrist. Monofilament exam performed 3/2023 showing reducing sensation bilaterally.\par -declines flu shot last visit \par \par 2.  Hyperlipidemia\par LDL 62   4/4/2023.\par Recommend to continue with atorvastatin 40 mg daily.  \par \par 3.  Hypertension: \par Blood pressure high again today. Recommended additional agent - had reaction to CCB (pt may have had issue with amlodipine but unable to confirm).\par Continue to monitor at home. \par Currently on lisinopril 40 mg once daily\par Start metoprolol ER 25 mg daily, sent again today\par  History of macroalbuminuria recommend follow-up with nephrology.\par \par 4. Macroalbuminuria \par on max lisinopril dosing\par previously not on SGLT2i given hx of foot ulcers (no active issue) - (avoid canagliflozin since studies showed association with increased risk of amputation).\par refer to renal again\par \par 5.   Hypercalcemia:\par Intermittent elevated serum calcium, normal corrected for albumin. Check PTH and 1,25 vit D. \par Will have pt will do 24 hr urine calcium   After review of these results.\par \par   Follow-up [facility] 2 months, Dr. Felipe October.

## 2023-04-25 NOTE — PHYSICAL EXAM
[Alert] : alert [Well Nourished] : well nourished [No Acute Distress] : no acute distress [Well Developed] : well developed [No Respiratory Distress] : no respiratory distress [No Accessory Muscle Use] : no accessory muscle use [Clear to Auscultation] : lungs were clear to auscultation bilaterally [Normal S1, S2] : normal S1 and S2 [Normal Rate] : heart rate was normal [Regular Rhythm] : with a regular rhythm [Normal Bowel Sounds] : normal bowel sounds [Not Tender] : non-tender [Not Distended] : not distended [Soft] : abdomen soft [Oriented x3] : oriented to person, place, and time [Normal Affect] : the affect was normal [Normal Insight/Judgement] : insight and judgment were intact

## 2023-04-25 NOTE — HISTORY OF PRESENT ILLNESS
[FreeTextEntry1] : Patient is a 67-year-old Male   With a history of type 2 diabetes, osteomyelitis, hypercalcemia, presenting for follow-up for type 2 diabetes and glucose review  and hypercalcemia.\par \par   Diabetes disease course and complications:\par He was diagnosed with type 2 diabetes in 1990s.  \par He had an infection on the right 3rd toe, he had surgery in Feb 2021.  No new foot issues. Now seeing podiatrist every few months. \par Patient has no known microvascular complication including CAD, CHF CVA in the past.\par Patient with macroalbuminuria, history of osteomyelitis of the lower extremity.\par Ophtho: UTD 1/2023, thinks DR?, was having floaters in the past which improved but still sees "spider webs", has early cataracts. Seeing optho regularly.\par Seeing podiatry. UTD. no active issues now \par going on a cruise soon - [facility], [place] \par \par  3/28/2023: A1c 6.4%\par \par Current DM regimen:\par basaglar 24 units QHS\par humalog 15 units with meals\par metformin 1000mg bid \par Mounjaro 2.5 mg weekly. \par \par  previously tried medications:\par Had GI upset with trulicity and was too expensive.\par With history of diabetic ulcer so was avoiding SGLTi for now \par \par Glucose monitoring:\par Time in range 79%, 18% high, 2% very high, less than 1% low\par The patient is still not always taking his insulin before meals, he often states that he takes his Humalog after lunch and then will hold his predinner dose, and then will take his dinner Humalog after dinner at bedtime.\par I discussed with the patient extensively that I do not recommend doing this.\par \par Regarding hypertension:  today. on Lisinopril 40 mg daily,  He monitors blood pressure at home.\par \par Regarding hyperlipidemia, patient currently takes the atorvastatin 40 mg daily.  Reports no myalgias or myopathy. \par \par Breakfast: eggs in the morning, sometimes crackers; another day he had a corn muffin. \par Lunch: he tries to have salad, chicken cutlets, crotons \par Dinner: He doesn't portion control for dinner. \par \par Patient had a history of renal cancer, status post resection in April 2017.  He did not require any chemotherapy.  He follows up with .  \par \par Hypercalcemia:\par Intermittent elevated serum calcium, normal corrected for albumin. next labs check PTH and 1,25 vit D. Will have pt will do 24 hr urine calcium.

## 2023-04-27 LAB
24R-OH-CALCIDIOL SERPL-MCNC: 30.7 PG/ML
25(OH)D3 SERPL-MCNC: 33.2 NG/ML
ALBUMIN SERPL ELPH-MCNC: 4.8 G/DL
ALP BLD-CCNC: 100 U/L
ALT SERPL-CCNC: 15 U/L
ANION GAP SERPL CALC-SCNC: 13 MMOL/L
AST SERPL-CCNC: 11 U/L
BILIRUB SERPL-MCNC: 0.8 MG/DL
BUN SERPL-MCNC: 21 MG/DL
CA-I SERPL-SCNC: 5 MG/DL
CALCIUM SERPL-MCNC: 10.8 MG/DL
CALCIUM SERPL-MCNC: 10.8 MG/DL
CHLORIDE SERPL-SCNC: 102 MMOL/L
CO2 SERPL-SCNC: 27 MMOL/L
CREAT SERPL-MCNC: 1.1 MG/DL
EGFR: 74 ML/MIN/1.73M2
GLUCOSE SERPL-MCNC: 113 MG/DL
PARATHYROID HORMONE INTACT: 73 PG/ML
POTASSIUM SERPL-SCNC: 5.2 MMOL/L
PROT SERPL-MCNC: 7.6 G/DL
SODIUM SERPL-SCNC: 142 MMOL/L

## 2023-05-02 ENCOUNTER — NON-APPOINTMENT (OUTPATIENT)
Age: 68
End: 2023-05-02

## 2023-05-17 ENCOUNTER — NON-APPOINTMENT (OUTPATIENT)
Age: 68
End: 2023-05-17

## 2023-05-25 NOTE — H&P PST ADULT - OTHER CARE PROVIDERS
dayna Oliver Endocrinologist dayna Lobato, last visit in 04/2020 Advancement Flap (Single) Text: The defect edges were debeveled with a #15 scalpel blade.  Given the location of the defect and the proximity to free margins a single advancement flap was deemed most appropriate.  Using a sterile surgical marker, an appropriate advancement flap was drawn incorporating the defect and placing the expected incisions within the relaxed skin tension lines where possible.    The area thus outlined was incised deep to adipose tissue with a #15 scalpel blade.  The skin margins were undermined to an appropriate distance in all directions utilizing iris scissors.

## 2023-06-01 NOTE — H&P PST ADULT - NSANTHOSAYNRD_GEN_A_CORE
32.8 No. JOSÉ MIGUEL screening performed.  STOP BANG Legend: 0-2 = LOW Risk; 3-4 = INTERMEDIATE Risk; 5-8 = HIGH Risk

## 2023-06-23 ENCOUNTER — APPOINTMENT (OUTPATIENT)
Dept: ENDOCRINOLOGY | Facility: CLINIC | Age: 68
End: 2023-06-23
Payer: COMMERCIAL

## 2023-06-23 ENCOUNTER — NON-APPOINTMENT (OUTPATIENT)
Age: 68
End: 2023-06-23

## 2023-06-23 VITALS
DIASTOLIC BLOOD PRESSURE: 90 MMHG | SYSTOLIC BLOOD PRESSURE: 140 MMHG | HEIGHT: 70 IN | OXYGEN SATURATION: 97 % | WEIGHT: 247 LBS | HEART RATE: 60 BPM | BODY MASS INDEX: 35.36 KG/M2

## 2023-06-23 LAB
GLUCOSE BLDC GLUCOMTR-MCNC: 143
HBA1C MFR BLD HPLC: 6.5

## 2023-06-23 PROCEDURE — 83036 HEMOGLOBIN GLYCOSYLATED A1C: CPT | Mod: QW

## 2023-06-23 PROCEDURE — 99214 OFFICE O/P EST MOD 30 MIN: CPT | Mod: 25

## 2023-06-23 PROCEDURE — 82962 GLUCOSE BLOOD TEST: CPT

## 2023-06-23 PROCEDURE — ZZZZZ: CPT

## 2023-06-23 NOTE — HISTORY OF PRESENT ILLNESS
[FreeTextEntry1] : Patient is a 67-year-old Male   With a history of type 2 diabetes, osteomyelitis, hypercalcemia, presenting for follow-up for type 2 diabetes and glucose review  and hypercalcemia.\par \par   Diabetes disease course and complications:\par He was diagnosed with type 2 diabetes in 1990s.  \par He had an infection on the right 3rd toe, he had surgery in Feb 2021.  No new foot issues. Now seeing podiatrist every few months. \par Patient has no known microvascular complication including CAD, CHF CVA in the past.\par Patient with macroalbuminuria, history of osteomyelitis of the lower extremity.\par Ophtho: UTD 1/2023, thinks DR?, was having floaters in the past which improved but still sees "spider webs", has early cataracts. \par Seeing optho regularly.\par He is dealing with floaters and cataracts and got some injections. \par Minimal retinopathy. \par Opthalmology: Dr. Jakob Macias [place]. \par \par Seeing podiatry. UTD. no active issues now \par \par  3/28/2023: A1c 6.4%\par A1c 6/23/2023:  6.5%\par \par Current DM regimen:\par Mounarjo 5 mg once weekly. \par Basaglar  20 units at bedtime. \par Humalog 12 units pre-meals. \par Metformin 1000 mg BID. \par \par  previously tried medications:\par Had GI upset with trulicity and was too expensive.\par With history of diabetic ulcer so was avoiding SGLTi for now \par \par Glucose monitoring:\par   There are no glucose readings for us to review at today's visit.\par Every now and then I go a little low. \par Couple of 300s, but otherwise pretty good. \par Hypoglycemia when he was sleeping, another episode before dinner (after lunch)\par \par The patient is still not always taking his insulin before meals, he often states that he takes his Humalog after lunch and then will hold his predinner dose, and then will take his dinner Humalog after dinner at bedtime.\par I discussed with the patient extensively that I do not recommend doing this.\par \par Last visit: 4/25/23:\par He is taking humalog sometimes after lunch and then AFTER dinner.  \par The patient is still not always taking his insulin before meals, he often states that he takes his Humalog after lunch and then will hold his pre-dinner dose, and then will take his dinner Humalog after dinner at bedtime.\par I discussed with the patient extensively that I do not recommend doing this.\par \par Regarding hypertension:  on Lisinopril 40 mg daily,  He monitors blood pressure at home.  He is also taking metoprolol 25 mg daily ER.\par \par Regarding hyperlipidemia, patient currently takes the atorvastatin 40 mg daily.  Reports no myalgias or myopathy. \par \par Breakfast: eggs in the morning, sometimes crackers; another day he had a corn muffin. \par Lunch: he tries to have salad, chicken cutlets, crotons \par Dinner: He doesn't portion control for dinner. \par \par Patient had a history of renal cancer, status post resection in April 2017.  He did not require any chemotherapy.  He follows up with .  \par \par Hypercalcemia:\par Intermittent elevated serum calcium, normal corrected for albumin. next labs check PTH and 1,25 vit D. Will have pt will do 24 hr urine calcium. \par Labs are consistent with primary hyperparathyroidism.\par # Primary Hyperparathyroidism \par - We previously discussed surgery for primary hyperparathyroidism including: \par Surgical indications for treatment asymptomatic primary hyperPTH:\par         Age < 50\par         Ca > 1  Mill[place] per deciliter above ULN\par         eGFR < 60, nephrolithiasis --> do meet the criteria \par         DXA T scores < -2.5 or previous vertebral fracture  \par         24 hour urine > 400 and increased stone risk biochemically \par  today we will order 24-hour urinary calcium to creatinine ratio and DEXA scan.

## 2023-06-23 NOTE — ASSESSMENT
[Diabetes Foot Care] : diabetes foot care [Long Term Vascular Complications] : long term vascular complications of diabetes [Carbohydrate Consistent Diet] : carbohydrate consistent diet [Importance of Diet and Exercise] : importance of diet and exercise to improve glycemic control, achieve weight loss and improve cardiovascular health [Exercise/Effect on Glucose] : exercise/effect on glucose [Hypoglycemia Management] : hypoglycemia management [Action and use of Insulin] : action and use of short and long-acting insulin [Self Monitoring of Blood Glucose] : self monitoring of blood glucose [Insulin Self-Administration] : insulin self-administration [Injection Technique, Storage, Sharps Disposal] : injection technique, storage, and sharps disposal [Retinopathy Screening] : Patient was referred to ophthalmology for retinopathy screening [Weight Loss] : weight loss [FreeTextEntry1] : 67-year-old male with history of uncontrolled type 2 diabetes, hypertension, hyperlipidemia, here for endocrinology follow-up.\par \par Call on Tuesday next week with blood work results on his home phone number ending in [phone].\par 1.  Type 2 diabetes\par 3/28/2023 A1c 6.4%.\par  A1c 6/23/2023: 6.5%, stable\par  I have no glucose readings to review at today's visit.\par \par Plan:\par Restart Mounjaro 5 mg once weekly. x 2 weeks. then increase to Mounjaro 7.5 mg once weekly. \par Continue Basaglar  20 units at bedtime.\par If running low <80 in the morning, decrease Basaglar to 16 units daily. \par Humalog  12 units pre-meals. \par If running low after meals, then can decrease to 10 units before meals. \par Continue Metformin 1000 mg BID.\par \par Minimal retinopathy. Opthalmology: Dr. Jakob [place].\par \par due to see him July and will discuss with opthalmology.\par I sent a message today that we will try to obtain the most recent ophthalmology report with dilated eye exam.  I also asked for the patient to discuss with Dr. Moore again that he is currently on Mounjaro to make sure that there are no contraindications.  I would like to make sure that the retinopathy is stable.  I discussed with the patient the risk for potential worsening of diabetic retinopathy while using GLP-1 receptor agonist.  He understands the risk.\par \par We are presently avoiding SGLT2 inhibitors in this patient with lower extremity ulcer and history of osteomyelitis.\par \par No hx of pancreatitis, medullary thyroid cancer or MEN syndrome in self or family, Discussed risk of worsening retinopathy with rapid glucose control. Discussed side effects of GI upset and association with the aforementioned issues. \par \par We discussed the importance of dietary changes, doing better overall. Limited exercise now due to podiatry issues\par Reviewed treatment of hypoglycemia\par He is to call if there's any high or low glucose <70 or above 250mg/dl.  \par Opthal: UTD 1/2023\par Podiatry: He is following up with podiatrist. Monofilament exam performed 3/2023 showing reducing sensation bilaterally.\par \par 2.  Hyperlipidemia\par LDL 62   \par Recommend to continue with atorvastatin 40 mg daily.  \par \par 3.  Hypertension: \par Blood pressure high again today at 140/90 mmHg. Recommended additional agent - had reaction to CCB (pt may have had issue with amlodipine but unable to confirm).\par Continue to monitor at home. \par Currently on lisinopril 40 mg once daily\par   Restart metoprolol ER 25 mg daily, sent again today\par  History of macroalbuminuria recommend follow-up with nephrology.\par \par 4. Macroalbuminuria \par On max lisinopril dosing\par Previously not on SGLT2i given hx of foot ulcers (no active issue) - (avoid canagliflozin since studies showed association with increased risk of amputation).\par refer to renal again\par \par 5.   Hypercalcemia:\par Intermittent elevated serum calcium, normal corrected for albumin. \par Will have pt will do 24 hr urine calcium.\par \par 6.  Primary hyperparathyroidism:\par Labs are consistent with primary hyperparathyroidism. \par \par  Today we will order a 24-hour urinary calcium to creatinine ratio.\par \par  Order bone density. \par \par Surgical indications for treatment asymptomatic primary hyperPTH:\par         Age < 50--> does not meet criteria\par         Ca > 1-1.5 mg/dL above ULN --> does not meet criteria\par         eGFR < 60, nephrolithiasis --> does meet the criteria \par         DXA T scores < -2.5 or previous vertebral fracture  --> will order DEXA\par         24 hour urine > 400 and increased stone risk biochemically --> will order 24 hour urine calcium. \par - Will continue to monitor calcium and discussed the need to stay hydrated\par \par   Follow-up Dr. Felipe October.

## 2023-06-28 ENCOUNTER — TRANSCRIPTION ENCOUNTER (OUTPATIENT)
Age: 68
End: 2023-06-28

## 2023-06-29 ENCOUNTER — TRANSCRIPTION ENCOUNTER (OUTPATIENT)
Age: 68
End: 2023-06-29

## 2023-06-29 LAB
ALBUMIN SERPL ELPH-MCNC: 4.8 G/DL
ALP BLD-CCNC: 96 U/L
ALT SERPL-CCNC: 27 U/L
ANION GAP SERPL CALC-SCNC: 13 MMOL/L
AST SERPL-CCNC: 22 U/L
BILIRUB SERPL-MCNC: 0.8 MG/DL
BUN SERPL-MCNC: 21 MG/DL
CA-I SERPL-SCNC: 5.4 MG/DL
CALCIUM SERPL-MCNC: 10.6 MG/DL
CALCIUM SERPL-MCNC: 10.7 MG/DL
CAU: 4 MG/DL
CHLORIDE SERPL-SCNC: 102 MMOL/L
CO2 SERPL-SCNC: 24 MMOL/L
CREAT 24H UR-MCNC: 1.4 G/24 H
CREAT 24H UR-MCNC: 1.4 G/24 H
CREAT ?TM UR-MCNC: 85 MG/DL
CREAT ?TM UR-MCNC: 85 MG/DL
CREAT SERPL-MCNC: 1.19 MG/DL
EGFR: 67 ML/MIN/1.73M2
GLUCOSE SERPL-MCNC: 140 MG/DL
PARATHYROID HORMONE INTACT: 51 PG/ML
POTASSIUM SERPL-SCNC: 5.4 MMOL/L
PROT ?TM UR-MCNC: 24 HR
PROT ?TM UR-MCNC: 24 HR
PROT SERPL-MCNC: 7.7 G/DL
SODIUM SERPL-SCNC: 139 MMOL/L
SPECIMEN VOL 24H UR: 1700 ML
SPECIMEN VOL 24H UR: 1700 ML
SPECIMEN VOL 24H UR: 68 MG/24 H

## 2023-08-08 ENCOUNTER — APPOINTMENT (OUTPATIENT)
Dept: UROLOGY | Facility: CLINIC | Age: 68
End: 2023-08-08

## 2023-09-11 ENCOUNTER — NON-APPOINTMENT (OUTPATIENT)
Age: 68
End: 2023-09-11

## 2023-09-22 ENCOUNTER — APPOINTMENT (OUTPATIENT)
Dept: UROLOGY | Facility: CLINIC | Age: 68
End: 2023-09-22
Payer: COMMERCIAL

## 2023-09-22 ENCOUNTER — OUTPATIENT (OUTPATIENT)
Dept: OUTPATIENT SERVICES | Facility: HOSPITAL | Age: 68
LOS: 1 days | End: 2023-09-22
Payer: COMMERCIAL

## 2023-09-22 DIAGNOSIS — N40.1 BENIGN PROSTATIC HYPERPLASIA WITH LOWER URINARY TRACT SYMPMS: ICD-10-CM

## 2023-09-22 DIAGNOSIS — N28.89 OTHER SPECIFIED DISORDERS OF KIDNEY AND URETER: ICD-10-CM

## 2023-09-22 DIAGNOSIS — N40.1 BENIGN PROSTATIC HYPERPLASIA WITH LOWER URINARY TRACT SYMPTOMS: ICD-10-CM

## 2023-09-22 DIAGNOSIS — C64.9 MALIGNANT NEOPLASM OF UNSPECIFIED KIDNEY, EXCEPT RENAL PELVIS: ICD-10-CM

## 2023-09-22 DIAGNOSIS — R35.0 FREQUENCY OF MICTURITION: ICD-10-CM

## 2023-09-22 DIAGNOSIS — N13.8 BENIGN PROSTATIC HYPERPLASIA WITH LOWER URINARY TRACT SYMPMS: ICD-10-CM

## 2023-09-22 DIAGNOSIS — Z90.5 ACQUIRED ABSENCE OF KIDNEY: Chronic | ICD-10-CM

## 2023-09-22 PROCEDURE — 76775 US EXAM ABDO BACK WALL LIM: CPT

## 2023-09-22 PROCEDURE — 99213 OFFICE O/P EST LOW 20 MIN: CPT

## 2023-09-22 RX ORDER — FINASTERIDE 5 MG/1
5 TABLET, FILM COATED ORAL DAILY
Qty: 90 | Refills: 3 | Status: ACTIVE | COMMUNITY
Start: 2023-01-24 | End: 1900-01-01

## 2023-10-03 ENCOUNTER — APPOINTMENT (OUTPATIENT)
Dept: ENDOCRINOLOGY | Facility: CLINIC | Age: 68
End: 2023-10-03
Payer: COMMERCIAL

## 2023-10-03 VITALS
WEIGHT: 237 LBS | OXYGEN SATURATION: 98 % | SYSTOLIC BLOOD PRESSURE: 130 MMHG | DIASTOLIC BLOOD PRESSURE: 74 MMHG | HEART RATE: 60 BPM | HEIGHT: 70 IN | BODY MASS INDEX: 33.93 KG/M2

## 2023-10-03 DIAGNOSIS — E78.5 HYPERLIPIDEMIA, UNSPECIFIED: ICD-10-CM

## 2023-10-03 PROCEDURE — 99215 OFFICE O/P EST HI 40 MIN: CPT | Mod: 25

## 2023-10-08 PROBLEM — E78.5 HYPERLIPIDEMIA LDL GOAL <70: Status: ACTIVE | Noted: 2020-10-06

## 2023-10-13 LAB
25(OH)D3 SERPL-MCNC: 33.9 NG/ML
ALBUMIN SERPL ELPH-MCNC: 4.6 G/DL
ALP BLD-CCNC: 99 U/L
ALT SERPL-CCNC: 21 U/L
ANION GAP SERPL CALC-SCNC: 11 MMOL/L
AST SERPL-CCNC: 17 U/L
BILIRUB SERPL-MCNC: 1 MG/DL
BUN SERPL-MCNC: 21 MG/DL
CALCIUM SERPL-MCNC: 11.1 MG/DL
CALCIUM SERPL-MCNC: 11.1 MG/DL
CHLORIDE SERPL-SCNC: 100 MMOL/L
CHOLEST SERPL-MCNC: 97 MG/DL
CO2 SERPL-SCNC: 28 MMOL/L
CREAT SERPL-MCNC: 1.2 MG/DL
EGFR: 66 ML/MIN/1.73M2
ESTIMATED AVERAGE GLUCOSE: 131 MG/DL
GLUCOSE SERPL-MCNC: 109 MG/DL
HBA1C MFR BLD HPLC: 6.2 %
HDLC SERPL-MCNC: 30 MG/DL
LDLC SERPL CALC-MCNC: 44 MG/DL
NONHDLC SERPL-MCNC: 67 MG/DL
PARATHYROID HORMONE INTACT: 57 PG/ML
POTASSIUM SERPL-SCNC: 5.4 MMOL/L
PROT SERPL-MCNC: 7.4 G/DL
SODIUM SERPL-SCNC: 139 MMOL/L
TRIGL SERPL-MCNC: 131 MG/DL

## 2023-11-13 ENCOUNTER — RX RENEWAL (OUTPATIENT)
Age: 68
End: 2023-11-13

## 2023-11-28 ENCOUNTER — APPOINTMENT (OUTPATIENT)
Dept: PEDIATRIC MEDICAL GENETICS | Facility: CLINIC | Age: 68
End: 2023-11-28
Payer: COMMERCIAL

## 2023-11-28 ENCOUNTER — APPOINTMENT (OUTPATIENT)
Dept: PEDIATRIC MEDICAL GENETICS | Facility: CLINIC | Age: 68
End: 2023-11-28

## 2023-11-28 PROCEDURE — 99202 OFFICE O/P NEW SF 15 MIN: CPT | Mod: 95

## 2023-12-05 ENCOUNTER — APPOINTMENT (OUTPATIENT)
Dept: PEDIATRIC MEDICAL GENETICS | Facility: CLINIC | Age: 68
End: 2023-12-05

## 2023-12-11 ENCOUNTER — NON-APPOINTMENT (OUTPATIENT)
Age: 68
End: 2023-12-11

## 2023-12-11 LAB
MISCELLANEOUS TEST: NORMAL
PROC NAME: NORMAL

## 2023-12-13 ENCOUNTER — EMERGENCY (EMERGENCY)
Facility: HOSPITAL | Age: 68
LOS: 1 days | Discharge: HOME | End: 2023-12-13
Attending: EMERGENCY MEDICINE | Admitting: EMERGENCY MEDICINE
Payer: MEDICARE

## 2023-12-13 VITALS
OXYGEN SATURATION: 99 % | SYSTOLIC BLOOD PRESSURE: 141 MMHG | DIASTOLIC BLOOD PRESSURE: 80 MMHG | RESPIRATION RATE: 16 BRPM | HEART RATE: 72 BPM

## 2023-12-13 VITALS
WEIGHT: 235.01 LBS | TEMPERATURE: 98 F | OXYGEN SATURATION: 98 % | RESPIRATION RATE: 14 BRPM | DIASTOLIC BLOOD PRESSURE: 72 MMHG | SYSTOLIC BLOOD PRESSURE: 125 MMHG | HEART RATE: 86 BPM

## 2023-12-13 DIAGNOSIS — Z90.5 ACQUIRED ABSENCE OF KIDNEY: Chronic | ICD-10-CM

## 2023-12-13 LAB
ALBUMIN SERPL ELPH-MCNC: 3.8 G/DL — SIGNIFICANT CHANGE UP (ref 3.3–5)
ALP SERPL-CCNC: 78 U/L — SIGNIFICANT CHANGE UP (ref 40–120)
ALT FLD-CCNC: 26 U/L — SIGNIFICANT CHANGE UP (ref 12–78)
ANION GAP SERPL CALC-SCNC: 6 MMOL/L — SIGNIFICANT CHANGE UP (ref 5–17)
AST SERPL-CCNC: 16 U/L — SIGNIFICANT CHANGE UP (ref 15–37)
BASOPHILS # BLD AUTO: 0.02 K/UL — SIGNIFICANT CHANGE UP (ref 0–0.2)
BASOPHILS NFR BLD AUTO: 0.2 % — SIGNIFICANT CHANGE UP (ref 0–2)
BILIRUB SERPL-MCNC: 1.2 MG/DL — SIGNIFICANT CHANGE UP (ref 0.2–1.2)
BUN SERPL-MCNC: 24 MG/DL — HIGH (ref 7–23)
CALCIUM SERPL-MCNC: 9.6 MG/DL — SIGNIFICANT CHANGE UP (ref 8.5–10.1)
CHLORIDE SERPL-SCNC: 108 MMOL/L — SIGNIFICANT CHANGE UP (ref 96–108)
CO2 SERPL-SCNC: 28 MMOL/L — SIGNIFICANT CHANGE UP (ref 22–31)
CREAT SERPL-MCNC: 1.4 MG/DL — HIGH (ref 0.5–1.3)
EGFR: 55 ML/MIN/1.73M2 — LOW
EOSINOPHIL # BLD AUTO: 0.05 K/UL — SIGNIFICANT CHANGE UP (ref 0–0.5)
EOSINOPHIL NFR BLD AUTO: 0.5 % — SIGNIFICANT CHANGE UP (ref 0–6)
GLUCOSE SERPL-MCNC: 149 MG/DL — HIGH (ref 70–99)
HCT VFR BLD CALC: 44.9 % — SIGNIFICANT CHANGE UP (ref 39–50)
HGB BLD-MCNC: 15.1 G/DL — SIGNIFICANT CHANGE UP (ref 13–17)
IMM GRANULOCYTES NFR BLD AUTO: 0.6 % — SIGNIFICANT CHANGE UP (ref 0–0.9)
LIDOCAIN IGE QN: 161 U/L — HIGH (ref 13–75)
LYMPHOCYTES # BLD AUTO: 1.07 K/UL — SIGNIFICANT CHANGE UP (ref 1–3.3)
LYMPHOCYTES # BLD AUTO: 10.1 % — LOW (ref 13–44)
MCHC RBC-ENTMCNC: 30.3 PG — SIGNIFICANT CHANGE UP (ref 27–34)
MCHC RBC-ENTMCNC: 33.6 GM/DL — SIGNIFICANT CHANGE UP (ref 32–36)
MCV RBC AUTO: 90.2 FL — SIGNIFICANT CHANGE UP (ref 80–100)
MONOCYTES # BLD AUTO: 0.37 K/UL — SIGNIFICANT CHANGE UP (ref 0–0.9)
MONOCYTES NFR BLD AUTO: 3.5 % — SIGNIFICANT CHANGE UP (ref 2–14)
NEUTROPHILS # BLD AUTO: 8.98 K/UL — HIGH (ref 1.8–7.4)
NEUTROPHILS NFR BLD AUTO: 85.1 % — HIGH (ref 43–77)
NRBC # BLD: 0 /100 WBCS — SIGNIFICANT CHANGE UP (ref 0–0)
PLATELET # BLD AUTO: 215 K/UL — SIGNIFICANT CHANGE UP (ref 150–400)
POTASSIUM SERPL-MCNC: 4.3 MMOL/L — SIGNIFICANT CHANGE UP (ref 3.5–5.3)
POTASSIUM SERPL-SCNC: 4.3 MMOL/L — SIGNIFICANT CHANGE UP (ref 3.5–5.3)
PROT SERPL-MCNC: 7.3 G/DL — SIGNIFICANT CHANGE UP (ref 6–8.3)
RBC # BLD: 4.98 M/UL — SIGNIFICANT CHANGE UP (ref 4.2–5.8)
RBC # FLD: 13.8 % — SIGNIFICANT CHANGE UP (ref 10.3–14.5)
SODIUM SERPL-SCNC: 142 MMOL/L — SIGNIFICANT CHANGE UP (ref 135–145)
TROPONIN I, HIGH SENSITIVITY RESULT: 5.6 NG/L — SIGNIFICANT CHANGE UP
WBC # BLD: 10.55 K/UL — HIGH (ref 3.8–10.5)
WBC # FLD AUTO: 10.55 K/UL — HIGH (ref 3.8–10.5)

## 2023-12-13 PROCEDURE — 83690 ASSAY OF LIPASE: CPT

## 2023-12-13 PROCEDURE — 99285 EMERGENCY DEPT VISIT HI MDM: CPT

## 2023-12-13 PROCEDURE — 96374 THER/PROPH/DIAG INJ IV PUSH: CPT

## 2023-12-13 PROCEDURE — 85025 COMPLETE CBC W/AUTO DIFF WBC: CPT

## 2023-12-13 PROCEDURE — 36415 COLL VENOUS BLD VENIPUNCTURE: CPT

## 2023-12-13 PROCEDURE — 99284 EMERGENCY DEPT VISIT MOD MDM: CPT | Mod: 25

## 2023-12-13 PROCEDURE — 99284 EMERGENCY DEPT VISIT MOD MDM: CPT

## 2023-12-13 PROCEDURE — 82962 GLUCOSE BLOOD TEST: CPT

## 2023-12-13 PROCEDURE — 80053 COMPREHEN METABOLIC PANEL: CPT

## 2023-12-13 PROCEDURE — 93010 ELECTROCARDIOGRAM REPORT: CPT

## 2023-12-13 PROCEDURE — 93005 ELECTROCARDIOGRAM TRACING: CPT

## 2023-12-13 PROCEDURE — 84484 ASSAY OF TROPONIN QUANT: CPT

## 2023-12-13 RX ORDER — SODIUM CHLORIDE 9 MG/ML
1000 INJECTION INTRAMUSCULAR; INTRAVENOUS; SUBCUTANEOUS ONCE
Refills: 0 | Status: COMPLETED | OUTPATIENT
Start: 2023-12-13 | End: 2023-12-13

## 2023-12-13 RX ADMIN — Medication 125 MILLIGRAM(S): at 15:25

## 2023-12-13 RX ADMIN — SODIUM CHLORIDE 1000 MILLILITER(S): 9 INJECTION INTRAMUSCULAR; INTRAVENOUS; SUBCUTANEOUS at 15:26

## 2023-12-13 NOTE — ED ADULT NURSE NOTE - NSFALLUNIVINTERV_ED_ALL_ED
Bed/Stretcher in lowest position, wheels locked, appropriate side rails in place/Call bell, personal items and telephone in reach/Instruct patient to call for assistance before getting out of bed/chair/stretcher/Non-slip footwear applied when patient is off stretcher/[place] to call system/Physically safe environment - no spills, clutter or unnecessary equipment/Purposeful proactive rounding/Room/bathroom lighting operational, light cord in reach Bed/Stretcher in lowest position, wheels locked, appropriate side rails in place/Call bell, personal items and telephone in reach/Instruct patient to call for assistance before getting out of bed/chair/stretcher/Non-slip footwear applied when patient is off stretcher/[place] to call system/Physically safe environment - no spills, clutter or unnecessary equipment/Purposeful proactive rounding/Room/bathroom lighting operational, light cord in reach

## 2023-12-13 NOTE — ED PROVIDER NOTE - PATIENT PORTAL LINK FT
You can access the FollowMyHealth Patient Portal offered by [facility] by registering at the following website: [email or web address]. By joining [facility]’s FollowMyHealth portal, you will also be able to view your health information using other applications (apps) compatible with our system. You can access the FollowMyHealth Patient Portal offered by [facility] by registering at the following website: [email or web address]. By joining [facility]’s FollowMyHealth portal, you will also be able to view your health information using other applications (apps) compatible with our system.

## 2023-12-13 NOTE — ED PROVIDER NOTE - INTERPRETATION
normal sinus rhythm, Normal axis, Normal ND interval and QRS complex. There are no acute ischemic ST or T-wave changes. normal sinus rhythm, Normal axis, Normal OR interval and QRS complex. There are no acute ischemic ST or T-wave changes.

## 2023-12-13 NOTE — ED PROVIDER NOTE - OBJECTIVE STATEMENT
67yo male bib ems s/p syncopal episode. pt was at home and was standing and got dizzy and lightheaded, sat down in the chair and passed out, witnessed by wife, who states he had a similar episode in the past and was worked up from a cardio standpoint. pt denies chest pain, sob, no dizziness no headache

## 2023-12-13 NOTE — CONSULT NOTE ADULT - SUBJECTIVE AND OBJECTIVE BOX
[facility] - Espinoza Connelly, Thomas, Raji, Nenita, Desmond Miller  Office Number: [phone]    Initial Consult Note    CHIEF COMPLAINT: Patient is a 68y old  Male who presents with a chief complaint of syncope     HPI:  68 year old male with DM presenting with syncope.     PAST MEDICAL & SURGICAL HISTORY:  Diabetes mellitus          SOCIAL HISTORY:  No tobacco, ethanol, or drug abuse.    FAMILY HISTORY:    No family history of acute MI or sudden cardiac death.    MEDICATIONS  (STANDING):  sodium chloride 0.9% Bolus 1000 milliLiter(s) IV Bolus once    MEDICATIONS  (PRN):      Allergies    No Known Allergies    Intolerances        REVIEW OF SYSTEMS:  All other review of systems is negative unless indicated above    VITAL SIGNS:   Vital Signs Last 24 Hrs  T(C): 36.7 (13 Dec 2023 14:09), Max: 36.7 (13 Dec 2023 14:09)  T(F): 98 (13 Dec 2023 14:09), Max: 98 (13 Dec 2023 14:09)  HR: 86 (13 Dec 2023 14:09) (86 - 86)  BP: 125/72 (13 Dec 2023 14:09) (125/72 - 125/72)  BP(mean): --  RR: 14 (13 Dec 2023 14:09) (14 - 14)  SpO2: 98% (13 Dec 2023 14:09) (98% - 98%)    Parameters below as of 13 Dec 2023 14:09  Patient On (Oxygen Delivery Method): room air        I&O's Summary      On Exam:    Constitutional: NAD, alert and oriented x 3  Lungs:  Non-labored, breath sounds are clear bilaterally, No wheezing, rales or rhonchi  Cardiovascular: RRR.  S1 and S2 positive.  No murmurs, rubs, gallops or clicks  Gastrointestinal: Bowel Sounds present, soft, nontender.   Lymph: No peripheral edema. No cervical lymphadenopathy.  Neurological: Alert, no focal deficits  Skin: No rashes or ulcers   Psych:  Mood & affect appropriate.    LABS: All Labs Reviewed:                Blood Culture:         RADIOLOGY:    EKG:           [facility] - Espinoza Connelly, Thomas, Raji, Nenita, Desmond Miller  Office Number: [phone]    Initial Consult Note    CHIEF COMPLAINT: Patient is a 68y old  Male who presents with a chief complaint of syncope     HPI:  68 year old male with DM presenting with syncope.     PAST MEDICAL & SURGICAL HISTORY:  Diabetes mellitus          SOCIAL HISTORY:  No tobacco, ethanol, or drug abuse.    FAMILY HISTORY:    No family history of acute MI or sudden cardiac death.    MEDICATIONS  (STANDING):  sodium chloride 0.9% Bolus 1000 milliLiter(s) IV Bolus once    MEDICATIONS  (PRN):      Allergies    No Known Allergies    Intolerances        REVIEW OF SYSTEMS:  All other review of systems is negative unless indicated above    VITAL SIGNS:   Vital Signs Last 24 Hrs  T(C): 36.7 (13 Dec 2023 14:09), Max: 36.7 (13 Dec 2023 14:09)  T(F): 98 (13 Dec 2023 14:09), Max: 98 (13 Dec 2023 14:09)  HR: 86 (13 Dec 2023 14:09) (86 - 86)  BP: 125/72 (13 Dec 2023 14:09) (125/72 - 125/72)  BP(mean): --  RR: 14 (13 Dec 2023 14:09) (14 - 14)  SpO2: 98% (13 Dec 2023 14:09) (98% - 98%)    Parameters below as of 13 Dec 2023 14:09  Patient On (Oxygen Delivery Method): room air        I&O's Summary      On Exam:    Constitutional: NAD, alert and oriented x 3  Lungs:  Non-labored, breath sounds are clear bilaterally, No wheezing, rales or rhonchi  Cardiovascular: RRR.  S1 and S2 positive.  No murmurs, rubs, gallops or clicks  Gastrointestinal: Bowel Sounds present, soft, nontender.   Lymph: No peripheral edema. No cervical lymphadenopathy.  Neurological: Alert, no focal deficits  Skin: No rashes or ulcers   Psych:  Mood & affect appropriate.    LABS: All Labs Reviewed:                Blood Culture:         RADIOLOGY:    EKG:           [facility] - Espinoza Connelly, Thomas, Raji, Nenita, Desmond Miller  Office Number: [phone]    Initial Consult Note    CHIEF COMPLAINT: Patient is a 68y old  Male who presents with a chief complaint of syncope     HPI:  68 year old male with DM, HTN,  HLD  presenting with syncope. He reports he was eating lunch today when he developed dizziness and then sat down then had witnessed LOC by his wife.   He reports similar episode on 10/31 after eating and he had dizziness and witnessed syncope.   He was seen by cardio [facility] and had unremarkable stress echo and holter.   In ed he was found with hives and flushed face. Denies any allergies. Had broccoli soup for lunch.   Wife states when he had syncope last month he was also flushed , she doesnt recall is he had hives.  No recent illness,travel.     PAST MEDICAL & SURGICAL HISTORY:  Diabetes mellitus    SOCIAL HISTORY:  No tobacco, ethanol, or drug abuse.    FAMILY HISTORY:    No family history of acute MI or sudden cardiac death.    MEDICATIONS  (STANDING):  sodium chloride 0.9% Bolus 1000 milliLiter(s) IV Bolus once    MEDICATIONS  (PRN):      Allergies    No Known Allergies    Intolerances        REVIEW OF SYSTEMS:  All other review of systems is negative unless indicated above    VITAL SIGNS:   Vital Signs Last 24 Hrs  T(C): 36.7 (13 Dec 2023 14:09), Max: 36.7 (13 Dec 2023 14:09)  T(F): 98 (13 Dec 2023 14:09), Max: 98 (13 Dec 2023 14:09)  HR: 86 (13 Dec 2023 14:09) (86 - 86)  BP: 125/72 (13 Dec 2023 14:09) (125/72 - 125/72)  BP(mean): --  RR: 14 (13 Dec 2023 14:09) (14 - 14)  SpO2: 98% (13 Dec 2023 14:09) (98% - 98%)    Parameters below as of 13 Dec 2023 14:09  Patient On (Oxygen Delivery Method): room air        I&O's Summary      On Exam:    Constitutional: NAD, alert and oriented x 3 , obese   Lungs:  Non-labored, breath sounds are clear bilaterally, No wheezing, rales or rhonchi  Cardiovascular: RRR.  S1 and S2 positive.  No murmurs, rubs, gallops or clicks  Gastrointestinal: Bowel Sounds present, soft, nontender.   Lymph: No peripheral edema. No cervical lymphadenopathy.  Neurological: Alert, no focal deficits  Skin: No rashes or ulcers , hives   Psych:  Mood & affect appropriate.    LABS: All Labs Reviewed:                     [facility] - Espinoza Connelly, Thomas, Raji, Nenita, Desmond Miller  Office Number: [phone]    Initial Consult Note    CHIEF COMPLAINT: Patient is a 68y old  Male who presents with a chief complaint of syncope     HPI:  68 year old male with DM, HTN,  HLD  presenting with syncope. He reports he was eating lunch today when he developed dizziness and then sat down then had witnessed LOC by his wife.   He reports similar episode on 10/31 after eating and he had dizziness and witnessed syncope.   He was seen by cardio [facility] and had unremarkable stress echo and holter.   In ed he was found with hives and flushed face. Denies any allergies. Had broccoli soup for lunch.   Wife states when he had syncope last month he was also flushed , she doesnt recall is he had hives.  No recent illness,travel.     PAST MEDICAL & SURGICAL HISTORY:  Diabetes mellitus    SOCIAL HISTORY:  No tobacco, ethanol, or drug abuse.    FAMILY HISTORY:    No family history of acute MI or sudden cardiac death.    MEDICATIONS  (STANDING):  sodium chloride 0.9% Bolus 1000 milliLiter(s) IV Bolus once    MEDICATIONS  (PRN):      Allergies    No Known Allergies    Intolerances        REVIEW OF SYSTEMS:  All other review of systems is negative unless indicated above    VITAL SIGNS:   Vital Signs Last 24 Hrs  T(C): 36.7 (13 Dec 2023 14:09), Max: 36.7 (13 Dec 2023 14:09)  T(F): 98 (13 Dec 2023 14:09), Max: 98 (13 Dec 2023 14:09)  HR: 86 (13 Dec 2023 14:09) (86 - 86)  BP: 125/72 (13 Dec 2023 14:09) (125/72 - 125/72)  BP(mean): --  RR: 14 (13 Dec 2023 14:09) (14 - 14)  SpO2: 98% (13 Dec 2023 14:09) (98% - 98%)    Parameters below as of 13 Dec 2023 14:09  Patient On (Oxygen Delivery Method): room air        I&O's Summary      On Exam:    Constitutional: NAD, alert and oriented x 3 , obese   Lungs:  Non-labored, breath sounds are clear bilaterally, No wheezing, rales or rhonchi  Cardiovascular: RRR.  S1 and S2 positive.  No murmurs, rubs, gallops or clicks  Gastrointestinal: Bowel Sounds present, soft, nontender.   Lymph: No peripheral edema. No cervical lymphadenopathy.  Neurological: Alert, no focal deficits  Skin: No rashes or ulcers , hives   Psych:  Mood & affect appropriate.    LABS: All Labs Reviewed:                     [facility] - Espinoza Connelly, Raji Guzman, Nenita, Desmond Miller  Office Number: [phone]    Initial Consult Note    CHIEF COMPLAINT: Patient is a 68y old  Male who presents with a chief complaint of syncope     HPI:  68 year old male with DM, HTN,  HLD  presenting with syncope. He reports he was eating lunch today when he developed dizziness and then sat down then had witnessed LOC by his wife.   He reports similar episode on 10/31 after eating he was bowling with urge to use bathroom then he had dizziness and witnessed syncope.   He was seen by cardio [facility] and had unremarkable stress echo and holter.   At that time they felt is was 2/2 to dehydration from mounjaro dose being increased.   In ed he was found with hives and flushed face. Denies difficulty swallowing., tongue swelling. Denies any allergies. Had broccoli soup for lunch.   Wife states when he had syncope last month he was also flushed , she doesnt recall is he had hives.  No recent illness,travel.     PAST MEDICAL & SURGICAL HISTORY:  Diabetes mellitus    SOCIAL HISTORY:  No tobacco, ethanol, or drug abuse.    FAMILY HISTORY:    No family history of acute MI or sudden cardiac death.    MEDICATIONS  (STANDING):  sodium chloride 0.9% Bolus 1000 milliLiter(s) IV Bolus once    MEDICATIONS  (PRN):      Allergies    No Known Allergies    Intolerances        REVIEW OF SYSTEMS:  All other review of systems is negative unless indicated above    VITAL SIGNS:   Vital Signs Last 24 Hrs  T(C): 36.7 (13 Dec 2023 14:09), Max: 36.7 (13 Dec 2023 14:09)  T(F): 98 (13 Dec 2023 14:09), Max: 98 (13 Dec 2023 14:09)  HR: 86 (13 Dec 2023 14:09) (86 - 86)  BP: 125/72 (13 Dec 2023 14:09) (125/72 - 125/72)  BP(mean): --  RR: 14 (13 Dec 2023 14:09) (14 - 14)  SpO2: 98% (13 Dec 2023 14:09) (98% - 98%)    Parameters below as of 13 Dec 2023 14:09  Patient On (Oxygen Delivery Method): room air        I&O's Summary      On Exam:    Constitutional: NAD, alert and oriented x 3 , obese   Lungs:  Non-labored, breath sounds are clear bilaterally, No wheezing, rales or rhonchi  Cardiovascular: RRR.  S1 and S2 positive.  No murmurs, rubs, gallops or clicks  Gastrointestinal: Bowel Sounds present, soft, nontender.   Lymph: No peripheral edema. No cervical lymphadenopathy.  Neurological: Alert, no focal deficits  Skin: No rashes or ulcers , hives trunk , face flushed   Psych:  Mood & affect appropriate.    LABS: All Labs Reviewed:                     [facility] - Espinoza Connelly, Raji Guzman, Nenita, Desmond Miller  Office Number: [phone]    Initial Consult Note    CHIEF COMPLAINT: Patient is a 68y old  Male who presents with a chief complaint of syncope     HPI:  68 year old male with DM, HTN,  HLD  presenting with syncope. He reports he was eating lunch today when he developed dizziness and then sat down then had witnessed LOC by his wife.   He reports similar episode on 10/31 after eating he was bowling with urge to use bathroom then he had dizziness and witnessed syncope.   He was seen by cardio [facility] and had unremarkable stress echo and holter.   At that time they felt is was 2/2 to dehydration from mounjaro dose being increased.   In ed he was found with hives and flushed face. Denies difficulty swallowing., tongue swelling. Denies any allergies. Had broccoli soup for lunch.   Wife states when he had syncope last month he was also flushed , she doesnt recall is he had hives.  No recent illness,travel.     PAST MEDICAL & SURGICAL HISTORY:  Diabetes mellitus    SOCIAL HISTORY:  No tobacco, ethanol, or drug abuse.    FAMILY HISTORY:    No family history of acute MI or sudden cardiac death.    MEDICATIONS  (STANDING):  sodium chloride 0.9% Bolus 1000 milliLiter(s) IV Bolus once    MEDICATIONS  (PRN):      Allergies    No Known Allergies    Intolerances        REVIEW OF SYSTEMS:  All other review of systems is negative unless indicated above    VITAL SIGNS:   Vital Signs Last 24 Hrs  T(C): 36.7 (13 Dec 2023 14:09), Max: 36.7 (13 Dec 2023 14:09)  T(F): 98 (13 Dec 2023 14:09), Max: 98 (13 Dec 2023 14:09)  HR: 86 (13 Dec 2023 14:09) (86 - 86)  BP: 125/72 (13 Dec 2023 14:09) (125/72 - 125/72)  BP(mean): --  RR: 14 (13 Dec 2023 14:09) (14 - 14)  SpO2: 98% (13 Dec 2023 14:09) (98% - 98%)    Parameters below as of 13 Dec 2023 14:09  Patient On (Oxygen Delivery Method): room air        I&O's Summary      On Exam:    Constitutional: NAD, alert and oriented x 3 , obese   Lungs:  Non-labored, breath sounds are clear bilaterally, No wheezing, rales or rhonchi  Cardiovascular: RRR.  S1 and S2 positive.  No murmurs, rubs, gallops or clicks  Gastrointestinal: Bowel Sounds present, soft, nontender.   Lymph: No peripheral edema. No cervical lymphadenopathy.  Neurological: Alert, no focal deficits  Skin: No rashes or ulcers , hives trunk , face flushed   Psych:  Mood & affect appropriate.    LABS: All Labs Reviewed:                     [facility] - Espinoza Connelly, Raji Guzman, Nenita, Desmond Miller  Office Number: [phone]    Initial Consult Note    CHIEF COMPLAINT: Patient is a 68y old  Male who presents with a chief complaint of syncope     HPI:  68 year old male with DM, HTN,  HLD  presenting with syncope. He reports he was eating lunch today when he developed dizziness and then sat down then had witnessed LOC by his wife.   He reports similar episode on 10/31 after eating he was bowling with urge to use bathroom then he had dizziness and witnessed syncope.   He was seen by cardio [facility] and had unremarkable stress echo and holter.   At that time they felt is was 2/2 to dehydration from mounjaro dose being increased.   In ed today he was found with hives and flushed face. Denies difficulty swallowing., tongue swelling. Denies any allergies. Had broccoli soup for lunch.   Wife states when he had syncope last month he was also flushed , she doesnt recall if he had hives.  No recent illness,travel.     PAST MEDICAL & SURGICAL HISTORY:  Diabetes mellitus    SOCIAL HISTORY:  No tobacco, ethanol, or drug abuse.    FAMILY HISTORY:    No family history of acute MI or sudden cardiac death.    MEDICATIONS  (STANDING):  sodium chloride 0.9% Bolus 1000 milliLiter(s) IV Bolus once    MEDICATIONS  (PRN):      Allergies    No Known Allergies    Intolerances        REVIEW OF SYSTEMS:  All other review of systems is negative unless indicated above    VITAL SIGNS:   Vital Signs Last 24 Hrs  T(C): 36.7 (13 Dec 2023 14:09), Max: 36.7 (13 Dec 2023 14:09)  T(F): 98 (13 Dec 2023 14:09), Max: 98 (13 Dec 2023 14:09)  HR: 86 (13 Dec 2023 14:09) (86 - 86)  BP: 125/72 (13 Dec 2023 14:09) (125/72 - 125/72)  BP(mean): --  RR: 14 (13 Dec 2023 14:09) (14 - 14)  SpO2: 98% (13 Dec 2023 14:09) (98% - 98%)    Parameters below as of 13 Dec 2023 14:09  Patient On (Oxygen Delivery Method): room air        I&O's Summary      On Exam:    Constitutional: NAD, alert and oriented x 3 , obese   Lungs:  Non-labored, breath sounds are clear bilaterally, No wheezing, rales or rhonchi  Cardiovascular: RRR.  S1 and S2 positive.  No murmurs, rubs, gallops or clicks  Gastrointestinal: Bowel Sounds present, soft, nontender.   Lymph: No peripheral edema. No cervical lymphadenopathy.  Neurological: Alert, no focal deficits  Skin: No rashes or ulcers , hives trunk , face flushed   Psych:  Mood & affect appropriate.    LABS: All Labs Reviewed:                     [facility] - Espinoza Connelly, Raji Guzman, Nenita, Desmond Miller  Office Number: [phone]    Initial Consult Note    CHIEF COMPLAINT: Patient is a 68y old  Male who presents with a chief complaint of syncope     HPI:  68 year old male with DM, HTN,  HLD  presenting with syncope. He reports he was eating lunch today when he developed dizziness and then sat down then had witnessed LOC by his wife.   He reports similar episode on 10/31 after eating he was bowling with urge to use bathroom then he had dizziness and witnessed syncope.   He was seen by cardio [facility] and had unremarkable stress echo and holter.   At that time they felt is was 2/2 to dehydration from mounjaro dose being increased.   In ed today he was found with hives and flushed face. Denies difficulty swallowing., tongue swelling. Denies any allergies. Had broccoli soup for lunch.   Wife states when he had syncope last month he was also flushed , she doesnt recall if he had hives.  No recent illness,travel.     PAST MEDICAL & SURGICAL HISTORY:  Diabetes mellitus    SOCIAL HISTORY:  No tobacco, ethanol, or drug abuse.    FAMILY HISTORY:    No family history of acute MI or sudden cardiac death.    MEDICATIONS  (STANDING):  sodium chloride 0.9% Bolus 1000 milliLiter(s) IV Bolus once    MEDICATIONS  (PRN):      Allergies    No Known Allergies    Intolerances        REVIEW OF SYSTEMS:  All other review of systems is negative unless indicated above    VITAL SIGNS:   Vital Signs Last 24 Hrs  T(C): 36.7 (13 Dec 2023 14:09), Max: 36.7 (13 Dec 2023 14:09)  T(F): 98 (13 Dec 2023 14:09), Max: 98 (13 Dec 2023 14:09)  HR: 86 (13 Dec 2023 14:09) (86 - 86)  BP: 125/72 (13 Dec 2023 14:09) (125/72 - 125/72)  BP(mean): --  RR: 14 (13 Dec 2023 14:09) (14 - 14)  SpO2: 98% (13 Dec 2023 14:09) (98% - 98%)    Parameters below as of 13 Dec 2023 14:09  Patient On (Oxygen Delivery Method): room air        I&O's Summary      On Exam:    Constitutional: NAD, alert and oriented x 3 , obese   Lungs:  Non-labored, breath sounds are clear bilaterally, No wheezing, rales or rhonchi  Cardiovascular: RRR.  S1 and S2 positive.  No murmurs, rubs, gallops or clicks  Gastrointestinal: Bowel Sounds present, soft, nontender.   Lymph: No peripheral edema. No cervical lymphadenopathy.  Neurological: Alert, no focal deficits  Skin: No rashes or ulcers , hives trunk , face flushed   Psych:  Mood & affect appropriate.    LABS: All Labs Reviewed:                     [facility] - Espinoza Connelly, Raji Guzman, Nenita, Desmond Miller  Office Number: [phone]    Initial Consult Note    CHIEF COMPLAINT: Patient is a 68y old  Male who presents with a chief complaint of syncope     HPI:  68 year old male with DM, HTN,  HLD  presenting with syncope. He reports he was eating lunch today when he developed dizziness and then sat down then had witnessed LOC by his wife.   He reports similar episode on 10/31 after eating he was bowling with urge to use bathroom then he had dizziness and witnessed syncope.   He was seen by cardio [facility] and had unremarkable stress, echo and holter.   At that time they felt is was 2/2 to dehydration from mounjaro dose being increased.   In ed today he was found with hives and flushed face. Denies difficulty swallowing., tongue swelling. Denies any allergies. Had broccoli soup for lunch.   Wife states when he had syncope last month he was also flushed , she doesnt recall if he had hives.  No recent illness,travel.     PAST MEDICAL & SURGICAL HISTORY:  Diabetes mellitus    SOCIAL HISTORY:  No tobacco, ethanol, or drug abuse.    FAMILY HISTORY:    No family history of acute MI or sudden cardiac death.    MEDICATIONS  (STANDING):  sodium chloride 0.9% Bolus 1000 milliLiter(s) IV Bolus once    MEDICATIONS  (PRN):      Allergies    No Known Allergies    Intolerances        REVIEW OF SYSTEMS:  All other review of systems is negative unless indicated above    VITAL SIGNS:   Vital Signs Last 24 Hrs  T(C): 36.7 (13 Dec 2023 14:09), Max: 36.7 (13 Dec 2023 14:09)  T(F): 98 (13 Dec 2023 14:09), Max: 98 (13 Dec 2023 14:09)  HR: 86 (13 Dec 2023 14:09) (86 - 86)  BP: 125/72 (13 Dec 2023 14:09) (125/72 - 125/72)  BP(mean): --  RR: 14 (13 Dec 2023 14:09) (14 - 14)  SpO2: 98% (13 Dec 2023 14:09) (98% - 98%)    Parameters below as of 13 Dec 2023 14:09  Patient On (Oxygen Delivery Method): room air        I&O's Summary      On Exam:    Constitutional: NAD, alert and oriented x 3 , obese   Lungs:  Non-labored, breath sounds are clear bilaterally, No wheezing, rales or rhonchi  Cardiovascular: RRR.  S1 and S2 positive.  No murmurs, rubs, gallops or clicks  Gastrointestinal: Bowel Sounds present, soft, nontender.   Lymph: No peripheral edema. No cervical lymphadenopathy.  Neurological: Alert, no focal deficits  Skin: No rashes or ulcers , hives trunk , face flushed   Psych:  Mood & affect appropriate.    LABS: All Labs Reviewed:                     [facility] - Espinoza Connelly, Raji Guzman, Nenita, Desmond Miller  Office Number: [phone]    Initial Consult Note    CHIEF COMPLAINT: Patient is a 68y old  Male who presents with a chief complaint of syncope     HPI:  68 year old male with DM, HTN,  HLD  presenting with syncope. He reports he was eating lunch today when he developed dizziness and then sat down then had witnessed LOC by his wife.   He reports similar episode on 10/31 after eating he was bowling with urge to use bathroom then he had dizziness and witnessed syncope.   He was seen by cardio [facility] and had unremarkable stress, echo and holter.   At that time they felt is was 2/2 to dehydration from mounjaro dose being increased.   In ed today he was found with hives and flushed face. Denies difficulty swallowing., tongue swelling. Denies any allergies. Had broccoli soup for lunch.   Wife states when he had syncope last month he was also flushed , she doesnt recall if he had hives.  No recent illness,travel.     PAST MEDICAL & SURGICAL HISTORY:  Diabetes mellitus    SOCIAL HISTORY:  No tobacco, ethanol, or drug abuse.    FAMILY HISTORY:    No family history of acute MI or sudden cardiac death.    MEDICATIONS  (STANDING):  sodium chloride 0.9% Bolus 1000 milliLiter(s) IV Bolus once    MEDICATIONS  (PRN):      Allergies    No Known Allergies    Intolerances        REVIEW OF SYSTEMS:  All other review of systems is negative unless indicated above    VITAL SIGNS:   Vital Signs Last 24 Hrs  T(C): 36.7 (13 Dec 2023 14:09), Max: 36.7 (13 Dec 2023 14:09)  T(F): 98 (13 Dec 2023 14:09), Max: 98 (13 Dec 2023 14:09)  HR: 86 (13 Dec 2023 14:09) (86 - 86)  BP: 125/72 (13 Dec 2023 14:09) (125/72 - 125/72)  BP(mean): --  RR: 14 (13 Dec 2023 14:09) (14 - 14)  SpO2: 98% (13 Dec 2023 14:09) (98% - 98%)    Parameters below as of 13 Dec 2023 14:09  Patient On (Oxygen Delivery Method): room air        I&O's Summary      On Exam:    Constitutional: NAD, alert and oriented x 3 , obese   Lungs:  Non-labored, breath sounds are clear bilaterally, No wheezing, rales or rhonchi  Cardiovascular: RRR.  S1 and S2 positive.  No murmurs, rubs, gallops or clicks  Gastrointestinal: Bowel Sounds present, soft, nontender.   Lymph: No peripheral edema. No cervical lymphadenopathy.  Neurological: Alert, no focal deficits  Skin: No rashes or ulcers , hives trunk , face flushed   Psych:  Mood & affect appropriate.    LABS: All Labs Reviewed:

## 2023-12-13 NOTE — ED PROVIDER NOTE - PROGRESS NOTE DETAILS
pt reevaluated, feeling better, rash has subsided, pt to be d/c home follow up with his cardiologist and return if any symptoms worsen

## 2023-12-13 NOTE — CONSULT NOTE ADULT - NS ATTEND AMEND GEN_ALL_CORE FT
I have personally seen and examined the patient in detail.  I have spoken to the provider regarding the assessment and plan of care.  I have made changes to the note accordingly.    68 year old male with DM, HTN,  HLD  presenting with syncope. He reports he was eating lunch today when he developed dizziness and then sat down then had witnessed LOC by his wife.   He reports similar episode on 10/31 after eating he was bowling developed dizziness with urge to use bathroom then had   witnessed syncope. At that time they felt is was 2/2 to dehydration from mounjaro dose being increased.     EKG NSR no acute st t changes   Followed by [facility] with recent Holter, Stress and Echo post syncope 10/31 which he reports was all unremarkable   monitor on tele in ed   Bp stable 125/72  Euvolemic on exam , sp I liter NS bolus in ed   no anginal complaints  troponin pending

## 2023-12-13 NOTE — ED ADULT TRIAGE NOTE - CHIEF COMPLAINT QUOTE
S/P syncopal episode. Patient was dizzy prior to the event. F/S was 131 by EMT. Intial BP was Low. Patient received IV fluids by EMT

## 2023-12-13 NOTE — CONSULT NOTE ADULT - ASSESSMENT
68 year old male with DM presenting with syncope.      68 year old male with DM, HTN,  HLD  presenting with syncope. He reports he was eating lunch today when he developed dizziness and then sat down then had witnessed LOC by his wife.   He reports similar episode on 10/31 after eating and he had dizziness and witnessed syncope.   He was seen by cardio [facility] and had unremarkable stress echo and holter.   In ed he was found with hives and flushed face. Denies any allergies. Had broccoli soup for lunch.   Wife states when he had syncope last month he was also flushed , she doesnt recall is he had hives.  No recent illness,travel.        68 year old male with DM, HTN,  HLD  presenting with syncope. He reports he was eating lunch today when he developed dizziness and then sat down then had witnessed LOC by his wife.   He reports similar episode on 10/31 after eating he was bowling developed dizziness with urge to use bathroom then had   witnessed syncope. At that time they felt is was 2/2 to dehydration from mounjaro dose being increased.     EKG NSR no acute st t changes   Followed by [facility] with recent Holter, Stress and Echo post syncope 10/31 which he reports was all unremarkable   monitor on tele in ed   Bp stable 125/72  Euvolemic on exam , sp I liter NS bolus in ed   no anginal complaints  troponin pending    Allergic reaction noted hives trunk, given solumedrol in ed   ALL labs pending at times of consult, further plan pending results of above

## 2023-12-13 NOTE — ED ADULT NURSE NOTE - OBJECTIVE STATEMENT
Patient states his wife saw him pass out for approx. 10 seconds.  he was sitting at the time.  He did not fall, did not hit his head.  Patient states he had a similar episode in October.  Patient arrived to the ER via EMS and was noted to be covered in hives which he stated were itchy.  He denies difficulty breathing or swallowing or speaking.  He arrived with 18 gauge IV left AC by EMS which flushes freely.  He is now awake and alert and per his wife he is at his baseline.

## 2023-12-13 NOTE — ED PROVIDER NOTE - DIFFERENTIAL DIAGNOSIS
Differential Diagnosis ddx considered but not limited to syncope, vasovagal, dehydration, electrolyte abnormality

## 2024-01-05 RX ORDER — METOPROLOL SUCCINATE 25 MG/1
25 TABLET, EXTENDED RELEASE ORAL DAILY
Qty: 90 | Refills: 3 | Status: ACTIVE | COMMUNITY
Start: 2022-09-22 | End: 1900-01-01

## 2024-01-05 RX ORDER — LISINOPRIL 40 MG/1
40 TABLET ORAL
Qty: 1 | Refills: 1 | Status: ACTIVE | COMMUNITY
Start: 2017-10-19 | End: 1900-01-01

## 2024-01-05 RX ORDER — ATORVASTATIN CALCIUM 40 MG/1
40 TABLET, FILM COATED ORAL
Qty: 90 | Refills: 1 | Status: ACTIVE | COMMUNITY
Start: 2017-05-15 | End: 1900-01-01

## 2024-01-23 PROBLEM — E11.9 TYPE 2 DIABETES MELLITUS WITHOUT COMPLICATIONS: Chronic | Status: ACTIVE | Noted: 2023-12-13

## 2024-02-02 RX ORDER — BLOOD-GLUCOSE SENSOR
EACH MISCELLANEOUS
Qty: 9 | Refills: 3 | Status: ACTIVE | COMMUNITY
Start: 2024-01-05 | End: 1900-01-01

## 2024-02-14 ENCOUNTER — APPOINTMENT (OUTPATIENT)
Dept: ENDOCRINOLOGY | Facility: CLINIC | Age: 69
End: 2024-02-14
Payer: MEDICARE

## 2024-02-14 VITALS
SYSTOLIC BLOOD PRESSURE: 130 MMHG | OXYGEN SATURATION: 96 % | HEIGHT: 70 IN | BODY MASS INDEX: 33.21 KG/M2 | DIASTOLIC BLOOD PRESSURE: 76 MMHG | WEIGHT: 232 LBS | HEART RATE: 73 BPM

## 2024-02-14 DIAGNOSIS — E83.52 HYPERCALCEMIA: ICD-10-CM

## 2024-02-14 DIAGNOSIS — E78.00 PURE HYPERCHOLESTEROLEMIA, UNSPECIFIED: ICD-10-CM

## 2024-02-14 PROCEDURE — 99214 OFFICE O/P EST MOD 30 MIN: CPT

## 2024-02-14 PROCEDURE — 95251 CONT GLUC MNTR ANALYSIS I&R: CPT

## 2024-02-14 RX ORDER — INSULIN GLARGINE 100 [IU]/ML
100 INJECTION, SOLUTION SUBCUTANEOUS DAILY
Qty: 30 | Refills: 1 | Status: ACTIVE | COMMUNITY
Start: 2024-02-14 | End: 1900-01-01

## 2024-02-14 RX ORDER — BLOOD-GLUCOSE SENSOR
EACH MISCELLANEOUS
Qty: 3 | Refills: 0 | Status: DISCONTINUED | COMMUNITY
Start: 2022-03-28 | End: 2024-02-14

## 2024-02-14 RX ORDER — INSULIN LISPRO 100 [IU]/ML
100 INJECTION, SOLUTION INTRAVENOUS; SUBCUTANEOUS
Qty: 45 | Refills: 1 | Status: ACTIVE | COMMUNITY
Start: 2021-01-27 | End: 1900-01-01

## 2024-02-14 RX ORDER — TIRZEPATIDE 5 MG/.5ML
5 INJECTION, SOLUTION SUBCUTANEOUS
Qty: 12 | Refills: 1 | Status: DISCONTINUED | COMMUNITY
Start: 2023-03-28 | End: 2024-02-14

## 2024-02-14 RX ORDER — FLASH GLUCOSE SENSOR
KIT MISCELLANEOUS
Qty: 6 | Refills: 3 | Status: DISCONTINUED | COMMUNITY
Start: 2021-01-27 | End: 2024-02-14

## 2024-02-14 RX ORDER — INSULIN GLARGINE-YFGN 100 [IU]/ML
100 INJECTION, SOLUTION SUBCUTANEOUS
Qty: 1 | Refills: 1 | Status: DISCONTINUED | COMMUNITY
Start: 2023-03-29 | End: 2024-02-14

## 2024-02-14 RX ORDER — INSULIN GLARGINE 100 [IU]/ML
100 INJECTION, SOLUTION SUBCUTANEOUS DAILY
Qty: 5 | Refills: 3 | Status: DISCONTINUED | COMMUNITY
Start: 2023-03-28 | End: 2024-02-14

## 2024-02-14 RX ORDER — BLOOD-GLUCOSE TRANSMITTER
EACH MISCELLANEOUS
Qty: 1 | Refills: 0 | Status: DISCONTINUED | COMMUNITY
Start: 2022-03-28 | End: 2024-02-14

## 2024-02-14 NOTE — ASSESSMENT
[Diabetes Foot Care] : diabetes foot care [Long Term Vascular Complications] : long term vascular complications of diabetes [Carbohydrate Consistent Diet] : carbohydrate consistent diet [Importance of Diet and Exercise] : importance of diet and exercise to improve glycemic control, achieve weight loss and improve cardiovascular health [Exercise/Effect on Glucose] : exercise/effect on glucose [Hypoglycemia Management] : hypoglycemia management [Action and use of Insulin] : action and use of short and long-acting insulin [Self Monitoring of Blood Glucose] : self monitoring of blood glucose [Insulin Self-Administration] : insulin self-administration [Injection Technique, Storage, Sharps Disposal] : injection technique, storage, and sharps disposal [Retinopathy Screening] : Patient was referred to ophthalmology for retinopathy screening [Weight Loss] : weight loss [FreeTextEntry1] : 68-year-old male with history of uncontrolled type 2 diabetes, hypertension, hyperlipidemia, here for endocrinology follow-up.  1.  Type 2 diabetes 3/28/2023 A1c 6.4%.  A1c 6/23/2023: 6.5%, stable check serum A1c  No longer using Vivo, using [facility].  Restart dexcom, should be available to patient by friday this week.  USING DEXCOM G7.   Plan: LANTUS 12 units daily.  Humalog 15 units pre-meals. eating 3 times daily. breakfast 8am or earlier, lunch 12-1 pm,  dinner anywhere from 6-8.  Increase to Humalog to 17 units pre-dinner.  Metformin 1000 mg BID  Opthalmology: Dr. Jakob Franklin [place]. [phone] I discussed with the patient the risk for potential worsening of diabetic retinopathy while using GLP-1 receptor agonist.  He understands the risk. We are presently avoiding SGLT2 inhibitors in this patient with lower extremity ulcer and history of osteomyelitis.  No hx of pancreatitis, medullary thyroid cancer or MEN syndrome in self or family, Discussed risk of worsening retinopathy with rapid glucose control. Discussed side effects of GI upset and association with the aforementioned issues.   We discussed the importance of dietary changes, doing better overall. Limited exercise now due to podiatry issues Reviewed treatment of hypoglycemia He is to call if there's any high or low glucose <70 or above 250mg/dl.   Opthal: UTD Podiatry: He is following up with podiatrist. Monofilament exam performed 3/2023 showing reducing sensation bilaterally.  2.  Hyperlipidemia LDL 62    Recommend to continue with atorvastatin 40 mg daily.    3.  Hypertension:   had reaction to CCB (pt may have had issue with amlodipine but unable to confirm). Continue to monitor  Currently on lisinopril 40 mg once daily. metoprolol ER 25 mg daily  History of macroalbuminuria recommend follow-up with nephrology.  4. Macroalbuminuria  On max lisinopril dosing Previously not on SGLT2i given hx of foot ulcers (no active issue) - (avoid canagliflozin since studies showed association with increased risk of amputation). needs to see renal  5. PTH mediated hypercalcemia (see HPI for workup). Clinical history suspicious for primary hyperpara but FeCa looks more like FHH - has forearm osteoporosis - repeat DXA in 1-2 years  Hypercalcemia: Intermittent elevated serum calcium, normal corrected for albumin. next labs check PTH and 1,25 vit D. Will have pt will do 24 hr urine calcium.   Bone density scan is reviewed as follows 6/23/2023 total spine bone mineral density 0.931, T score -1.5 osteopenia Femoral neck BMD 0.890, T score -0.3 Total hip 0.959, T score -0.5 Normal bone mineral density in hip FRAX score: 4.2% major fracture, 0.3% hip fracture 1/3 radius: Bone mineral density 0.649, T score -3.2  Given forearm OP, and PTH mediated hypercalcemia, pt was worked up for primary hyperpara. Repeat testing demonstrates serum calcium of 10.6, and 24 hr urine calcium showing FeCa 0.005, which is potentially consistent with FHH. Urine calcium was 68 mg/24 hr in 6/2023. Pt was vit D replete. Pt was referred to medical genetics for further eval.  Labs 10/13/2023: Calcium elevated at 11.1, PTH mediated, patient was awaiting medical genetics eval.  Vitamin D replete.  Potassium 5.4 patient was on ACE inhibitor was advised to proceed with a low potassium diet and recheck in future.  11/1/2023 the patient was admitted to [facility] for syncope possible concern for dehydration due to gastrointestinal symptoms which were likely attributed to Mounjaro.  Patient was advised to potentially hold the Mounjaro and continue only with the metformin and the insulin.  11/28/2023: Patient met with medical genetics and was discussed to have genetic testing for differentiating familial hypocalciuric hypercalcemia from hyperparathyroidism.  Genetic testing results were negative which indicated the patient is unlikely to have a hereditary hypercalcemia syndrome.  Patient with significant interval history, multiple hospitalizations, as per the patient's wife verbal report calcium was normal in the hospital at 10.3 we will recheck this today  Check labs today.    6. Bone health discussed osteopenia in spine as well as osteoporosis finding in forearm --> more consistent with primary hyperpara however urine testing possibly c/w FHH (pt has been vit D replete) will need to assess for other risk factors for bone loss if he does have FHH and not primary hyperpara. hold off on osteoporosis treatment for now given isolated finding in forearm, does not have excessive fracture risk

## 2024-02-14 NOTE — HISTORY OF PRESENT ILLNESS
[FreeTextEntry1] : Patient is a 68-year-old Male   With a history of type 2 diabetes, osteomyelitis, hypercalcemia, presenting for follow-up for type 2 diabetes and glucose review and hypercalcemia.  Please note recent hospitalizations and syncope as detailed below in interval history.    Diabetes disease course and complications: He was diagnosed with type 2 diabetes in 1990s.   He had an infection on the right 3rd toe, he had surgery in Feb 2021.  No new foot issues. Now seeing podiatrist every few months.  Patient has no known microvascular complication including CAD, CHF CVA in the past. Patient with macroalbuminuria, history of osteomyelitis of the lower extremity. Ophtho: UTD 2023, thinks DR, was having floaters in the past which improved but still sees "spider webs", has early cataracts.  Seeing optho regularly. Getting injections.  He is dealing with floaters and cataracts and got some injections.  Ophthalmology: Dr. Jakob Macias [place].   Seeing podiatry. UTD. no active issues now.  3/28/2023: A1c 6.4% A1c 6/23/2023:  6.5% A1c 2/14/23: no POC testing, will check on labs.   Current DM regimen: Basaglar  12 units at bedtime.  Humalog 15 units pre-meals. eating 3 times daily. breakfast 8am or earlier, lunch 12-1 pm,  dinner anywhere from 6-8.  Metformin 1000 mg BID  previously tried medications: Had GI upset with trulicity and was too expensive. With history of diabetic ulcer so was avoiding SGLTi for now. Mounjaro 7.5 mg once weekly.- eye doctor aware, no increase in dose. Still getting injections, reports eyes stable; sometimes nausea .  This was stopped after an episode of syncope that was thought to be related to dehydration.  Glucose monitoring: Dexcom:  58% in range, 37% high, 4% very high.  [id number]  Regarding hypertension:  on Lisinopril 40 mg daily,  He monitors blood pressure at home.  He is also taking metoprolol 25 mg daily ER.  Regarding hyperlipidemia, patient currently takes the atorvastatin 40 mg daily.  Reports no myalgias or myopathy.   Breakfast: eggs in the morning, sometimes crackers; another day he had a corn muffin.  Lunch: he tries to have salad, chicken cutlets, crotons  Dinner: He doesn't portion control for dinner.   Patient had a history of renal cancer, status post resection in April 2017.  He did not require any chemotherapy.  He follows up with .    Hypercalcemia: Intermittent elevated serum calcium, normal corrected for albumin. next labs check PTH and 1,25 vit D. Will have pt will do 24 hr urine calcium.   Bone density scan is reviewed as follows 6/23/2023 total spine bone mineral density 0.931, T score -1.5 osteopenia Femoral neck BMD 0.890, T score -0.3 Total hip 0.959, T score -0.5 Normal bone mineral density in hip FRAX score: 4.2% major fracture, 0.3% hip fracture 1/3 radius: Bone mineral density 0.649, T score -3.2  Given forearm OP, and PTH mediated hypercalcemia, pt was worked up for primary hyperpara. Repeat testing demonstrates serum calcium of 10.6, and 24 hr urine calcium showing FeCa 0.005, which is potentially consistent with FHH. Urine calcium was 68 mg/24 hr in 6/2023. Pt was vit D replete. Pt was referred to medical genetics for further eval.  Interval history:  Labs 10/13/2023: Calcium elevated at 11.1, PTH mediated, patient was awaiting medical genetics eval.  Vitamin D replete.  Potassium 5.4 patient was on ACE inhibitor was advised to proceed with a low potassium diet and recheck in future.  11/1/2023 the patient was admitted to [facility] for syncope possible concern for dehydration due to gastrointestinal symptoms which were likely attributed to Mounjaro.  Patient was advised to potentially hold the Mounjaro and continue only with the metformin and the insulin.  11/28/2023: Patient met with medical genetics and was discussed to have genetic testing for differentiating familial hypocalciuric hypercalcemia from hyperparathyroidism.  Genetic testing results were negative which indicated the patient is unlikely to have a hereditary hypercalcemia syndrome.  12/13/23: Patient was at his daughters house and he felt dizzy and passed out again, this was after stopping Mounjaro. He passed out and the blood pressure was 74/40 mmHg.  He regained consciousness at the house, and was brought to [facility]. His nephew is a cardiologist.  Avinash went to the cardiologist, he had an echo and EKG, stress test. Testing was normal. Avinash then saw Dr. Reed, urologist, and he had BPH, he was urinating excessively and he was put on Flomax and Finasteride 5 mg daily.  The patient is already on two BP medications, which is Lisinopril and Metoprolol, and he was advised to stop flomax. He has a loop recorder in now.   1/18/24: Avinash did not show up to a family event, and he found unconscious and covered in vomit and was thought to have had a massive stroke. and he was brought to [facility]. He was hospitalized for 8 days. Head Ct was negative, and they did a head MRI.  He was found to have petechial hemorrhages in the globus pallidine.  Stroke and TIA was ruled out.  He was found to have colitis.  He was passed out for a while.  He had an EEG which was  normal.  He had a spinal tap which was normal.  There is an unclear etiology.  Thyroid level he states was "10" which we will recheck.  He then saw [facility] neurology, who ordered another EEG, MRI, low suspicion for epilepsy. then he saw another second opinion Dr. Soila Medina neurologist in [place], who wants him to have the MRI with contrast next Tuesday.  He is also having a non-contrast CT of abdomen and lungs.  Calcium was normal at 10.3.

## 2024-02-16 LAB
25(OH)D3 SERPL-MCNC: 29.1 NG/ML
ALBUMIN SERPL ELPH-MCNC: 4.6 G/DL
ALP BLD-CCNC: 87 U/L
ALT SERPL-CCNC: 17 U/L
ANION GAP SERPL CALC-SCNC: 12 MMOL/L
AST SERPL-CCNC: 14 U/L
BILIRUB SERPL-MCNC: 0.6 MG/DL
BUN SERPL-MCNC: 22 MG/DL
CA-I SERPL-SCNC: 5.3 MG/DL
CALCIUM SERPL-MCNC: 10.4 MG/DL
CALCIUM SERPL-MCNC: 10.4 MG/DL
CHLORIDE SERPL-SCNC: 101 MMOL/L
CHOLEST SERPL-MCNC: 112 MG/DL
CO2 SERPL-SCNC: 28 MMOL/L
CREAT SERPL-MCNC: 1.16 MG/DL
CREAT SPEC-SCNC: 134 MG/DL
EGFR: 69 ML/MIN/1.73M2
ESTIMATED AVERAGE GLUCOSE: 151 MG/DL
GLUCOSE SERPL-MCNC: 88 MG/DL
HBA1C MFR BLD HPLC: 6.9 %
HDLC SERPL-MCNC: 35 MG/DL
LDLC SERPL CALC-MCNC: 55 MG/DL
MAGNESIUM SERPL-MCNC: 1.9 MG/DL
MICROALBUMIN 24H UR DL<=1MG/L-MCNC: 17.8 MG/DL
MICROALBUMIN/CREAT 24H UR-RTO: 133 MG/G
NONHDLC SERPL-MCNC: 77 MG/DL
PARATHYROID HORMONE INTACT: 59 PG/ML
PHOSPHATE SERPL-MCNC: 3.2 MG/DL
POTASSIUM SERPL-SCNC: 4.8 MMOL/L
PROT SERPL-MCNC: 7.4 G/DL
SODIUM SERPL-SCNC: 140 MMOL/L
T3 SERPL-MCNC: 94 NG/DL
T4 FREE SERPL-MCNC: 1.2 NG/DL
TRIGL SERPL-MCNC: 116 MG/DL
TSH SERPL-ACNC: 2.2 UIU/ML

## 2024-02-21 ENCOUNTER — APPOINTMENT (OUTPATIENT)
Dept: INTERNAL MEDICINE | Facility: CLINIC | Age: 69
End: 2024-02-21

## 2024-03-26 ENCOUNTER — NON-APPOINTMENT (OUTPATIENT)
Age: 69
End: 2024-03-26

## 2024-03-26 ENCOUNTER — APPOINTMENT (OUTPATIENT)
Dept: INTERNAL MEDICINE | Facility: CLINIC | Age: 69
End: 2024-03-26
Payer: MEDICARE

## 2024-03-26 VITALS
TEMPERATURE: 98 F | DIASTOLIC BLOOD PRESSURE: 85 MMHG | HEART RATE: 78 BPM | HEIGHT: 70 IN | WEIGHT: 232 LBS | SYSTOLIC BLOOD PRESSURE: 134 MMHG | RESPIRATION RATE: 17 BRPM | OXYGEN SATURATION: 96 % | BODY MASS INDEX: 33.21 KG/M2

## 2024-03-26 LAB
APTT BLD: 32.7 SEC
INR PPP: 0.98 RATIO
PT BLD: 11.2 SEC

## 2024-03-26 PROCEDURE — 99214 OFFICE O/P EST MOD 30 MIN: CPT | Mod: 25

## 2024-03-26 PROCEDURE — 93000 ELECTROCARDIOGRAM COMPLETE: CPT | Mod: 59

## 2024-03-26 RX ORDER — TAMSULOSIN HYDROCHLORIDE 0.4 MG/1
0.4 CAPSULE ORAL
Qty: 90 | Refills: 3 | Status: DISCONTINUED | COMMUNITY
Start: 2023-01-24 | End: 2024-03-26

## 2024-03-26 NOTE — HISTORY OF PRESENT ILLNESS
[No Pertinent Cardiac History] : no history of aortic stenosis, atrial fibrillation, coronary artery disease, recent myocardial infarction, or implantable device/pacemaker [Coronary Artery Disease] : coronary artery disease [No Pertinent Pulmonary History] : no history of asthma, COPD, sleep apnea, or smoking [No Adverse Anesthesia Reaction] : no adverse anesthesia reaction in self or family member [Chronic Kidney Disease] : chronic kidney disease [Diabetes] : diabetes [(Patient denies any chest pain, claudication, dyspnea on exertion, orthopnea, palpitations or syncope)] : Patient denies any chest pain, claudication, dyspnea on exertion, orthopnea, palpitations or syncope [Aortic Stenosis] : no aortic stenosis [Atrial Fibrillation] : no atrial fibrillation [Implantable Device/Pacemaker] : no implantable device/pacemaker [Recent Myocardial Infarction] : no recent myocardial infarction [Chronic Anticoagulation] : no chronic anticoagulation [FreeTextEntry2] : 04/12/2024 [FreeTextEntry1] : RIGHT CATARACT SURGERY [FreeTextEntry3] : DR SCOTT DAVE [FreeTextEntry4] : 68 years old male with DM, HTN, HLD, presents for medical clearance consultation for cataract surgery requested by DR SCOTT GLORIA, Mr HOLDEN states feeling well today offers no complaints, pre operative labs to be done today, ECG done reviewed.

## 2024-03-26 NOTE — ASSESSMENT
[Patient Optimized for Surgery] : Patient optimized for surgery [No Further Testing Recommended] : no further testing recommended [As per surgery] : as per surgery [FreeTextEntry4] : 68 years old male presents for medical clearance consultation for cataract surgery, he is medically stable, clear for surgery [FreeTextEntry7] : instructed to hold insulin and metformin day of surgery, to take Blood pressure medication.

## 2024-03-27 LAB
ALBUMIN SERPL ELPH-MCNC: 4.5 G/DL
ALP BLD-CCNC: 84 U/L
ALT SERPL-CCNC: 13 U/L
ANION GAP SERPL CALC-SCNC: 14 MMOL/L
AST SERPL-CCNC: 12 U/L
BASOPHILS # BLD AUTO: 0.03 K/UL
BASOPHILS NFR BLD AUTO: 0.5 %
BILIRUB SERPL-MCNC: 0.7 MG/DL
BUN SERPL-MCNC: 21 MG/DL
CALCIUM SERPL-MCNC: 10.3 MG/DL
CHLORIDE SERPL-SCNC: 102 MMOL/L
CO2 SERPL-SCNC: 26 MMOL/L
CREAT SERPL-MCNC: 1.14 MG/DL
EGFR: 70 ML/MIN/1.73M2
EOSINOPHIL # BLD AUTO: 0.3 K/UL
EOSINOPHIL NFR BLD AUTO: 4.7 %
GLUCOSE SERPL-MCNC: 123 MG/DL
HCT VFR BLD CALC: 43.3 %
HGB BLD-MCNC: 14.4 G/DL
IMM GRANULOCYTES NFR BLD AUTO: 0.3 %
LYMPHOCYTES # BLD AUTO: 1.21 K/UL
LYMPHOCYTES NFR BLD AUTO: 19.1 %
MAN DIFF?: NORMAL
MCHC RBC-ENTMCNC: 30.2 PG
MCHC RBC-ENTMCNC: 33.3 GM/DL
MCV RBC AUTO: 90.8 FL
MONOCYTES # BLD AUTO: 0.52 K/UL
MONOCYTES NFR BLD AUTO: 8.2 %
NEUTROPHILS # BLD AUTO: 4.27 K/UL
NEUTROPHILS NFR BLD AUTO: 67.2 %
PLATELET # BLD AUTO: 171 K/UL
POTASSIUM SERPL-SCNC: 4.8 MMOL/L
PROT SERPL-MCNC: 7.2 G/DL
RBC # BLD: 4.77 M/UL
RBC # FLD: 13.9 %
SODIUM SERPL-SCNC: 142 MMOL/L
WBC # FLD AUTO: 6.35 K/UL

## 2024-04-09 ENCOUNTER — APPOINTMENT (OUTPATIENT)
Dept: ENDOCRINOLOGY | Facility: CLINIC | Age: 69
End: 2024-04-09
Payer: MEDICARE

## 2024-04-09 VITALS
DIASTOLIC BLOOD PRESSURE: 80 MMHG | BODY MASS INDEX: 33.64 KG/M2 | OXYGEN SATURATION: 98 % | HEART RATE: 87 BPM | WEIGHT: 235 LBS | SYSTOLIC BLOOD PRESSURE: 144 MMHG | HEIGHT: 70 IN

## 2024-04-09 DIAGNOSIS — E83.52 HYPERCALCEMIA: ICD-10-CM

## 2024-04-09 DIAGNOSIS — M81.0 AGE-RELATED OSTEOPOROSIS W/OUT CURRENT PATHOLOGICAL FRACTURE: ICD-10-CM

## 2024-04-09 DIAGNOSIS — I10 ESSENTIAL (PRIMARY) HYPERTENSION: ICD-10-CM

## 2024-04-09 DIAGNOSIS — E55.9 VITAMIN D DEFICIENCY, UNSPECIFIED: ICD-10-CM

## 2024-04-09 PROCEDURE — 99214 OFFICE O/P EST MOD 30 MIN: CPT

## 2024-04-09 PROCEDURE — G2211 COMPLEX E/M VISIT ADD ON: CPT

## 2024-04-09 PROCEDURE — 95251 CONT GLUC MNTR ANALYSIS I&R: CPT

## 2024-04-09 RX ORDER — SEMAGLUTIDE 0.68 MG/ML
2 INJECTION, SOLUTION SUBCUTANEOUS
Qty: 2 | Refills: 3 | Status: ACTIVE | COMMUNITY
Start: 2024-04-09 | End: 1900-01-01

## 2024-04-09 NOTE — ASSESSMENT
[Diabetes Foot Care] : diabetes foot care [Long Term Vascular Complications] : long term vascular complications of diabetes [Carbohydrate Consistent Diet] : carbohydrate consistent diet [Importance of Diet and Exercise] : importance of diet and exercise to improve glycemic control, achieve weight loss and improve cardiovascular health [Exercise/Effect on Glucose] : exercise/effect on glucose [Hypoglycemia Management] : hypoglycemia management [Action and use of Insulin] : action and use of short and long-acting insulin [Self Monitoring of Blood Glucose] : self monitoring of blood glucose [Insulin Self-Administration] : insulin self-administration [Injection Technique, Storage, Sharps Disposal] : injection technique, storage, and sharps disposal [Retinopathy Screening] : Patient was referred to ophthalmology for retinopathy screening [Weight Loss] : weight loss [FreeTextEntry1] : 68-year-old male with history of uncontrolled type 2 diabetes, hypertension, hyperlipidemia, here for endocrinology follow-up.  1.  Type 2 diabetes 3/28/2023 A1c 6.4%.  A1c 6/23/2023: 6.5%, stable A1c 6.9% 2/14/2024 Dexcom is downloaded and interpreted as follows in range 55%, 32% high, 11% very high, 1% low, less than 1% very low.  Patient appears to be spiking postprandially after lunch and dinner meals.  However glucose readings are not consistent, 3/28/2024 patient's glucose is in range throughout the day, on other days the patient is having postprandial spikes. GMI 7.6% 4/9/24  No longer using [facility], using [facility].  Restart dexcom, should be available to patient by friday this week.  USING DEXCOM G7.   Plan: Continue LANTUS 12 units daily.  REDUCE Humalog 14 units pre-breakfast, 14 units prelunch, 14 units predinner.. eating 3 times daily. breakfast 8am or earlier, lunch 12-1 pm,  dinner anywhere from 6-8.  Continue Metformin 1000 mg BID Caution with GLP agonist due to patient's previous reaction, caution with SGLT2 inhibitor due to lower extremity wound history. Trial Ozempic 0.25 mg once weekly until next visit.   Opthalmology: Dr. Jakob Franklin [place]. [phone] I discussed with the patient the risk for potential worsening of diabetic retinopathy while using GLP-1 receptor agonist.  He understands the risk. We are presently avoiding SGLT2 inhibitors in this patient with lower extremity ulcer and history of osteomyelitis. No burning but some tingling of the feet.   No hx of pancreatitis, medullary thyroid cancer or MEN syndrome in self or family, Discussed risk of worsening retinopathy with rapid glucose control. Discussed side effects of GI upset and association with the aforementioned issues.   We discussed the importance of dietary changes, doing better overall. Limited exercise now due to podiatry issues Reviewed treatment of hypoglycemia He is to call if there's any high or low glucose <70 or above 250mg/dl.   Opthal: UTD Podiatry: He is following up with podiatrist. Monofilament exam performed 3/2023 showing reducing sensation bilaterally.  2.  Hyperlipidemia LDL 55   Recommend to continue with atorvastatin 40 mg daily.    3.  Hypertension:   had reaction to CCB (pt may have had issue with amlodipine but unable to confirm). Continue to monitor  Currently on lisinopril 40 mg once daily. metoprolol ER 25 mg daily  History of macroalbuminuria recommend follow-up with nephrology.  4. Macroalbuminuria  On max lisinopril dosing Previously not on SGLT2i given hx of foot ulcers (no active issue) - (avoid canagliflozin since studies showed association with increased risk of amputation). needs to see renal  5. PTH mediated hypercalcemia (see HPI for workup). Clinical history suspicious for primary hyperpara but FeCa looks more like FHH - has forearm osteoporosis - repeat DXA in 1-2 years -Normal calcium 10.4 , 2/14/24  6. Hypercalcemia: Intermittent elevated serum calcium, normal corrected for albumin. next labs check PTH and 1,25 vit D.  Will have pt will do 24 hr urine calcium.   Bone density scan is reviewed as follows 6/23/2023 total spine bone mineral density 0.931, T score -1.5 osteopenia Femoral neck BMD 0.890, T score -0.3 Total hip 0.959, T score -0.5 Normal bone mineral density in hip FRAX score: 4.2% major fracture, 0.3% hip fracture 1/3 radius: Bone mineral density 0.649, T score -3.2  Given forearm OP, and PTH mediated hypercalcemia, pt was worked up for primary hyperpara. Repeat testing demonstrates serum calcium of 10.6, and 24 hr urine calcium showing FeCa 0.005, which is potentially consistent with FHH. Urine calcium was 68 mg/24 hr in 6/2023. Pt was vit D replete. Pt was referred to medical genetics for further eval.  Labs 10/13/2023: Calcium elevated at 11.1, PTH mediated, patient was awaiting medical genetics eval.  Vitamin D replete.  Potassium 5.4 patient was on ACE inhibitor was advised to proceed with a low potassium diet and recheck in future.  11/1/2023 the patient was admitted to [facility] for syncope possible concern for dehydration due to gastrointestinal symptoms which were likely attributed to Mounjaro.  Patient was advised to potentially hold the Mounjaro and continue only with the metformin and the insulin.  11/28/2023: Patient met with medical genetics and was discussed to have genetic testing for differentiating familial hypocalciuric hypercalcemia from hyperparathyroidism.  Genetic testing results were negative which indicated the patient is unlikely to have a hereditary hypercalcemia syndrome.  Patient with significant interval history, multiple hospitalizations, as per the patient's wife verbal report calcium was normal in the hospital at 10.3 - The Kidney Disease: Improving Global Outcomes (KDIGO) guidelines recommend an A1C goal of 7 to 7.5 percent for kidney transplant recipients. Repeat Calcium 2/14/24: 10.4  7. Bone health discussed osteopenia in spine as well as osteoporosis finding in forearm --> more consistent with primary hyperpara however urine testing possibly c/w FHH (pt has been vit D replete) will need to assess for other risk factors for bone loss if he does have FHH and not primary hyperpara. hold off on osteoporosis treatment for now given isolated finding in forearm, does not have excessive fracture risk

## 2024-04-09 NOTE — HISTORY OF PRESENT ILLNESS
[FreeTextEntry1] : Patient is a 68-year-old Male   With a history of type 2 diabetes, osteomyelitis, hypercalcemia, presenting for follow-up for type 2 diabetes and glucose review and hypercalcemia.  Please note recent hospitalizations and syncope as detailed below in interval history.  Diabetes disease course and complications: He was diagnosed with type 2 diabetes in 1990s.   He had an infection on the right 3rd toe, he had surgery in Feb 2021.  No new foot issues. Now seeing podiatrist every few months.  Patient has no known microvascular complication including CAD, CHF CVA in the past. Patient with macroalbuminuria, history of osteomyelitis of the lower extremity. Ophtho: UTD 2023, thinks DR, was having floaters in the past which improved but still sees "spider webs", has early cataracts.  Seeing optho regularly. Getting injections.  He is dealing with floaters and cataracts and got some injections.  Ophthalmology: Dr. Jakob Macias [place].   Seeing podiatry. UTD. no active issues now.  3/28/2023: A1c 6.4% A1c 6/23/2023:  6.5% A1c 2/14/23: 6.9%  Current DM regimen: LANTUS 12 units daily.  Humalog 15 units pre-brekafast, 15 units prelunch, 17 units predinner.. eating 3 times daily. breakfast 8am or earlier, lunch 12-1 pm,  dinner anywhere from 6-8.  Metformin 1000 mg BID  previously tried medications: Had GI upset with trulicity and was too expensive. With history of diabetic ulcer so was avoiding SGLTi for now. Mounjaro 7.5 mg once weekly.- eye doctor aware, no increase in dose. Still getting injections, reports eyes stable; sometimes nausea .  This was stopped after an episode of syncope that was thought to be related to dehydration.  Glucose monitoring: Dexcom:  Dexcom is downloaded and interpreted as follows in range 55%, 32% high, 11% very high, 1% low, less than 1% very low.  Patient appears to be spiking postprandially after lunch and dinner meals.  However glucose readings are not consistent, 3/28/2024 patient's glucose is in range throughout the day, on other days the patient is having postprandial spikes. [id number]  Regarding hypertension:  on Lisinopril 40 mg daily,  He monitors blood pressure at home.  He is also taking metoprolol 25 mg daily ER.  Regarding hyperlipidemia, patient currently takes the atorvastatin 40 mg daily.  Reports no myalgias or myopathy.   Breakfast: eggs in the morning, sometimes crackers; another day he had a corn muffin.  Lunch: he tries to have salad, chicken cutlets, crotons  Dinner: He doesn't portion control for dinner.   Patient had a history of renal cancer, status post resection in April 2017.  He did not require any chemotherapy.  He follows up with .    Hypercalcemia: Intermittent elevated serum calcium, normal corrected for albumin. next labs check PTH and 1,25 vit D. Will have pt will do 24 hr urine calcium.  Of note labs to select 6/16/24: Calcium normal borderline at 10.4.  Bone density scan is reviewed as follows 6/23/2023 total spine bone mineral density 0.931, T score -1.5 osteopenia Femoral neck BMD 0.890, T score -0.3 Total hip 0.959, T score -0.5 Normal bone mineral density in hip FRAX score: 4.2% major fracture, 0.3% hip fracture 1/3 radius: Bone mineral density 0.649, T score -3.2  Given forearm OP, and PTH mediated hypercalcemia, pt was worked up for primary hyperpara. Repeat testing demonstrates serum calcium of 10.6, and 24 hr urine calcium showing FeCa 0.005, which is potentially consistent with FHH. Urine calcium was 68 mg/24 hr in 6/2023. Pt was vit D replete. Pt was referred to medical genetics for further eval.  INTERVAL HISTORY  Labs 10/13/2023: Calcium elevated at 11.1, PTH mediated, patient was awaiting medical genetics eval.  Vitamin D replete.  Potassium 5.4 patient was on ACE inhibitor was advised to proceed with a low potassium diet and recheck in future.  11/1/2023 the patient was admitted to [facility] for syncope possible concern for dehydration due to gastrointestinal symptoms which were likely attributed to Mounjaro.  Patient was advised to potentially hold the Mounjaro and continue only with the metformin and the insulin.  11/28/2023: Patient met with medical genetics and was discussed to have genetic testing for differentiating familial hypocalciuric hypercalcemia from hyperparathyroidism.  Genetic testing results were negative which indicated the patient is unlikely to have a hereditary hypercalcemia syndrome.  12/13/23: Patient was at his daughters house and he felt dizzy and passed out again, this was after stopping Mounjaro. He passed out and the blood pressure was 74/40 mmHg.  He regained consciousness at the house, and was brought to [facility]. His nephew is a cardiologist.  Avinash went to the cardiologist, he had an echo and EKG, stress test. Testing was normal. Avinash then saw Dr. Reed, urologist, and he had BPH, he was urinating excessively and he was put on Flomax and Finasteride 5 mg daily.  The patient is already on two BP medications, which is Lisinopril and Metoprolol, and he was advised to stop flomax. He has a loop recorder in now.   1/18/24: Avinash did not show up to a family event, and he found unconscious and covered in vomit and was thought to have had a massive stroke. and he was brought to [place]. He was hospitalized for 8 days. Head Ct was negative, and they did a head MRI.  He was found to have petechial hemorrhages in the globus pallidine.  Stroke and TIA was ruled out.  He was found to have colitis.  He was passed out for a while.  He had an EEG which was  normal.  He had a spinal tap which was normal.  There is an unclear etiology.  Thyroid level he states was "10" which we will recheck.  He then saw [facility] neurology, who ordered another EEG, MRI, low suspicion for epilepsy. then he saw another second opinion Dr. Soila Medina neurologist in [place], who wants him to have the MRI with contrast next Tuesday.  He is also having a non-contrast CT of abdomen and lungs.  Calcium was normal at 10.3.   4/9/24: No interval illnesses.

## 2024-04-23 ENCOUNTER — NON-APPOINTMENT (OUTPATIENT)
Age: 69
End: 2024-04-23

## 2024-04-23 RX ORDER — DULAGLUTIDE 0.75 MG/.5ML
0.75 INJECTION, SOLUTION SUBCUTANEOUS
Qty: 3 | Refills: 0 | Status: ACTIVE | COMMUNITY
Start: 2024-04-23 | End: 1900-01-01

## 2024-04-25 ENCOUNTER — APPOINTMENT (OUTPATIENT)
Dept: INTERNAL MEDICINE | Facility: CLINIC | Age: 69
End: 2024-04-25
Payer: MEDICARE

## 2024-04-25 VITALS
RESPIRATION RATE: 17 BRPM | BODY MASS INDEX: 34.36 KG/M2 | DIASTOLIC BLOOD PRESSURE: 83 MMHG | OXYGEN SATURATION: 97 % | HEIGHT: 70 IN | TEMPERATURE: 98.1 F | HEART RATE: 81 BPM | SYSTOLIC BLOOD PRESSURE: 143 MMHG | WEIGHT: 240 LBS

## 2024-04-25 DIAGNOSIS — Z01.818 ENCOUNTER FOR OTHER PREPROCEDURAL EXAMINATION: ICD-10-CM

## 2024-04-25 DIAGNOSIS — E11.9 TYPE 2 DIABETES MELLITUS W/OUT COMPLICATIONS: ICD-10-CM

## 2024-04-25 DIAGNOSIS — H26.9 UNSPECIFIED CATARACT: ICD-10-CM

## 2024-04-25 PROCEDURE — 99214 OFFICE O/P EST MOD 30 MIN: CPT

## 2024-04-25 NOTE — HISTORY OF PRESENT ILLNESS
[No Pertinent Cardiac History] : no history of aortic stenosis, atrial fibrillation, coronary artery disease, recent myocardial infarction, or implantable device/pacemaker [No Pertinent Pulmonary History] : no history of asthma, COPD, sleep apnea, or smoking [No Adverse Anesthesia Reaction] : no adverse anesthesia reaction in self or family member [Diabetes] : diabetes [(Patient denies any chest pain, claudication, dyspnea on exertion, orthopnea, palpitations or syncope)] : Patient denies any chest pain, claudication, dyspnea on exertion, orthopnea, palpitations or syncope [Chronic Anticoagulation] : no chronic anticoagulation [Chronic Kidney Disease] : no chronic kidney disease [FreeTextEntry1] : LEFT EYE CATARACT SURGERY [FreeTextEntry2] : 05/07/2024 [FreeTextEntry3] : DR SCOTT GLORIA [FreeTextEntry4] : 68 years old male with HTN, DM, HLD presents for medical clearance consultation for left eye cataract surgery , requested by DR SCOTT GLORIA, he had right eye cataract surgery last month; had preoperative labs done, normal , ECG done NSR , reviewed; he states feeling well, offers no complaints.

## 2024-04-25 NOTE — ASSESSMENT
[Patient Optimized for Surgery] : Patient optimized for surgery [No Further Testing Recommended] : no further testing recommended [Modify medications prior to procedure] : Modify medications prior to procedure [As per surgery] : as per surgery [FreeTextEntry4] : 68 years old male with HTN, DM presents for medical clearance consultation for left eye cataract surgery; he is medically stable , clear for surgery. [FreeTextEntry7] : instructed not to apply insulin day of surgery.

## 2024-05-14 ENCOUNTER — APPOINTMENT (OUTPATIENT)
Dept: INTERNAL MEDICINE | Facility: CLINIC | Age: 69
End: 2024-05-14
Payer: MEDICARE

## 2024-05-14 VITALS
DIASTOLIC BLOOD PRESSURE: 83 MMHG | WEIGHT: 236 LBS | BODY MASS INDEX: 33.79 KG/M2 | HEIGHT: 70 IN | SYSTOLIC BLOOD PRESSURE: 141 MMHG | OXYGEN SATURATION: 97 % | TEMPERATURE: 98.3 F | RESPIRATION RATE: 17 BRPM | HEART RATE: 82 BPM

## 2024-05-14 DIAGNOSIS — E11.29 TYPE 2 DIABETES MELLITUS WITH OTHER DIABETIC KIDNEY COMPLICATION: ICD-10-CM

## 2024-05-14 DIAGNOSIS — R80.9 TYPE 2 DIABETES MELLITUS WITH OTHER DIABETIC KIDNEY COMPLICATION: ICD-10-CM

## 2024-05-14 DIAGNOSIS — I10 ESSENTIAL (PRIMARY) HYPERTENSION: ICD-10-CM

## 2024-05-14 DIAGNOSIS — M79.89 OTHER SPECIFIED SOFT TISSUE DISORDERS: ICD-10-CM

## 2024-05-14 DIAGNOSIS — Z12.11 ENCOUNTER FOR SCREENING FOR MALIGNANT NEOPLASM OF COLON: ICD-10-CM

## 2024-05-14 DIAGNOSIS — Z00.00 ENCOUNTER FOR GENERAL ADULT MEDICAL EXAMINATION W/OUT ABNORMAL FINDINGS: ICD-10-CM

## 2024-05-14 PROCEDURE — G0402 INITIAL PREVENTIVE EXAM: CPT

## 2024-05-14 PROCEDURE — G0439: CPT

## 2024-05-14 NOTE — HISTORY OF PRESENT ILLNESS
[de-identified] : 68 years old male presents for annual physical exam, states feeling well , refers chronic right shoulder scapula area soft tissue mass for more than five years, painless

## 2024-05-14 NOTE — HEALTH RISK ASSESSMENT
[Fair] :  ~his/her~ mood as fair [No] : No [No falls in past year] : Patient reported no falls in the past year [0] : 2) Feeling down, depressed, or hopeless: Not at all (0) [PHQ-2 Negative - No further assessment needed] : PHQ-2 Negative - No further assessment needed [Patient reported colonoscopy was normal] : Patient reported colonoscopy was normal [HIV test declined] : HIV test declined [Hepatitis C test declined] : Hepatitis C test declined [None] : None [With Family] : lives with family [Retired] : retired [High School] : high school [] :  [# Of Children ___] : has [unfilled] children [Sexually Active] : sexually active [Feels Safe at Home] : Feels safe at home [Fully functional (bathing, dressing, toileting, transferring, walking, feeding)] : Fully functional (bathing, dressing, toileting, transferring, walking, feeding) [Fully functional (using the telephone, shopping, preparing meals, housekeeping, doing laundry, using] : Fully functional and needs no help or supervision to perform IADLs (using the telephone, shopping, preparing meals, housekeeping, doing laundry, using transportation, managing medications and managing finances) [Reports changes in vision] : Reports changes in vision [Seat Belt] :  uses seat belt [Former] : Former [> 15 Years] : > 15 Years [[id number]] : 0 [Change in mental status noted] : No change in mental status noted [Reports changes in hearing] : Reports no changes in hearing [Reports changes in dental health] : Reports no changes in dental health [Smoke Detector] : no smoke detector [Carbon Monoxide Detector] : no carbon monoxide detector [Guns at Home] : no guns at home [ColonoscopyDate] : 2020 [de-identified] : cataract surgery

## 2024-05-14 NOTE — PLAN
[FreeTextEntry1] : 1. annual physical exam * routine general labs done * age appropriate health maintenance recommendations reviewed, discussed including healthy diet, regular exercise 2. type 2 diabetes * c/w same medicatioons * Dietary counseling given, dietary avoidance discussed, diet and exercise reviewed with patient; patient reminded of importance of aerobic exercise, weight control, dietary compliance and regular glucose monitoring 3. HTN * c/w lisinopril and metoprolol 4. mass soft tissue right shoudler * referral for US and orthopedic surgeon 5. colon cancer screening * FIT test * will call back for test results * f/u in six months

## 2024-05-28 ENCOUNTER — NON-APPOINTMENT (OUTPATIENT)
Age: 69
End: 2024-05-28

## 2024-07-03 ENCOUNTER — RX RENEWAL (OUTPATIENT)
Age: 69
End: 2024-07-03

## 2024-07-08 ENCOUNTER — RX RENEWAL (OUTPATIENT)
Age: 69
End: 2024-07-08

## 2024-07-09 ENCOUNTER — APPOINTMENT (OUTPATIENT)
Dept: ENDOCRINOLOGY | Facility: CLINIC | Age: 69
End: 2024-07-09
Payer: MEDICARE

## 2024-07-09 VITALS
DIASTOLIC BLOOD PRESSURE: 82 MMHG | SYSTOLIC BLOOD PRESSURE: 134 MMHG | OXYGEN SATURATION: 98 % | WEIGHT: 243 LBS | BODY MASS INDEX: 34.87 KG/M2 | HEART RATE: 78 BPM

## 2024-07-09 DIAGNOSIS — E11.9 TYPE 2 DIABETES MELLITUS W/OUT COMPLICATIONS: ICD-10-CM

## 2024-07-09 DIAGNOSIS — I10 ESSENTIAL (PRIMARY) HYPERTENSION: ICD-10-CM

## 2024-07-09 DIAGNOSIS — E83.52 HYPERCALCEMIA: ICD-10-CM

## 2024-07-09 DIAGNOSIS — E78.5 HYPERLIPIDEMIA, UNSPECIFIED: ICD-10-CM

## 2024-07-09 DIAGNOSIS — M81.0 AGE-RELATED OSTEOPOROSIS W/OUT CURRENT PATHOLOGICAL FRACTURE: ICD-10-CM

## 2024-07-09 PROCEDURE — 99205 OFFICE O/P NEW HI 60 MIN: CPT

## 2024-07-09 PROCEDURE — 95251 CONT GLUC MNTR ANALYSIS I&R: CPT

## 2024-07-16 ENCOUNTER — NON-APPOINTMENT (OUTPATIENT)
Age: 69
End: 2024-07-16

## 2024-07-17 ENCOUNTER — NON-APPOINTMENT (OUTPATIENT)
Age: 69
End: 2024-07-17

## 2024-07-18 ENCOUNTER — NON-APPOINTMENT (OUTPATIENT)
Age: 69
End: 2024-07-18

## 2024-07-18 DIAGNOSIS — E87.5 HYPERKALEMIA: ICD-10-CM

## 2024-07-18 LAB
25(OH)D3 SERPL-MCNC: 33.1 NG/ML
ALBUMIN SERPL ELPH-MCNC: 4.7 G/DL
ALP BLD-CCNC: 90 U/L
ALT SERPL-CCNC: 16 U/L
ANION GAP SERPL CALC-SCNC: 12 MMOL/L
BILIRUB SERPL-MCNC: 0.9 MG/DL
BUN SERPL-MCNC: 19 MG/DL
CALCIUM SERPL-MCNC: 10.6 MG/DL
CALCIUM SERPL-MCNC: 10.6 MG/DL
CHLORIDE SERPL-SCNC: 99 MMOL/L
CO2 SERPL-SCNC: 28 MMOL/L
CREAT SERPL-MCNC: 1.3 MG/DL
ESTIMATED AVERAGE GLUCOSE: 157 MG/DL
GLUCOSE SERPL-MCNC: 163 MG/DL
HBA1C MFR BLD HPLC: 7.1 %
PARATHYROID HORMONE INTACT: 70 PG/ML
POTASSIUM SERPL-SCNC: 5.7 MMOL/L
PROT SERPL-MCNC: 7.5 G/DL
SODIUM SERPL-SCNC: 138 MMOL/L

## 2024-07-23 DIAGNOSIS — R55 SYNCOPE AND COLLAPSE: ICD-10-CM

## 2024-07-31 ENCOUNTER — APPOINTMENT (OUTPATIENT)
Dept: ENDOCRINOLOGY | Facility: CLINIC | Age: 69
End: 2024-07-31
Payer: MEDICARE

## 2024-07-31 DIAGNOSIS — Z00.00 ENCOUNTER FOR GENERAL ADULT MEDICAL EXAMINATION W/OUT ABNORMAL FINDINGS: ICD-10-CM

## 2024-07-31 LAB
ACTH STIM ACTH BASELINE: 26.7 PG/ML
ACTH STIM CORTISOL 30 MIN: 29.1 UG/DL
ACTH STIM CORTISOL BASELINE: 13.3 UG/DL
ACTH STIMULATION : CORTISOL 60 MIN: 27.4 UG/DL
ALBUMIN SERPL ELPH-MCNC: 4.4 G/DL
ALP BLD-CCNC: 124 U/L
ALT SERPL-CCNC: 23 U/L
ANION GAP SERPL CALC-SCNC: 12 MMOL/L
AST SERPL-CCNC: 14 U/L
BILIRUB SERPL-MCNC: 0.6 MG/DL
BUN SERPL-MCNC: 22 MG/DL
CALCIUM SERPL-MCNC: 10.3 MG/DL
CHLORIDE SERPL-SCNC: 101 MMOL/L
CO2 SERPL-SCNC: 27 MMOL/L
CREAT SERPL-MCNC: 1.25 MG/DL
EGFR: 62 ML/MIN/1.73M2
GLUCOSE SERPL-MCNC: 175 MG/DL
POTASSIUM SERPL-SCNC: 5.1 MMOL/L
PROT SERPL-MCNC: 7.5 G/DL
SODIUM SERPL-SCNC: 140 MMOL/L

## 2024-07-31 PROCEDURE — 96372 THER/PROPH/DIAG INJ SC/IM: CPT

## 2024-07-31 RX ORDER — COSYNTROPIN 0.25 MG/ML
0.25 INJECTION, POWDER, LYOPHILIZED, FOR SOLUTION INTRAVENOUS
Qty: 0 | Refills: 0 | Status: COMPLETED | OUTPATIENT
Start: 2024-07-29

## 2024-08-05 ENCOUNTER — TRANSCRIPTION ENCOUNTER (OUTPATIENT)
Age: 69
End: 2024-08-05

## 2024-08-12 ENCOUNTER — OUTPATIENT (OUTPATIENT)
Dept: OUTPATIENT SERVICES | Facility: HOSPITAL | Age: 69
LOS: 1 days | Discharge: HOME | End: 2024-08-12

## 2024-08-12 DIAGNOSIS — D68.2 HEREDITARY DEFICIENCY OF OTHER CLOTTING FACTORS: ICD-10-CM

## 2024-08-12 DIAGNOSIS — Z90.5 ACQUIRED ABSENCE OF KIDNEY: Chronic | ICD-10-CM

## 2024-08-19 ENCOUNTER — APPOINTMENT (OUTPATIENT)
Dept: HEMATOLOGY ONCOLOGY | Facility: CLINIC | Age: 69
End: 2024-08-19
Payer: MEDICARE

## 2024-08-19 ENCOUNTER — RESULT REVIEW (OUTPATIENT)
Age: 69
End: 2024-08-19

## 2024-08-19 VITALS
DIASTOLIC BLOOD PRESSURE: 98 MMHG | OXYGEN SATURATION: 98 % | HEART RATE: 79 BPM | TEMPERATURE: 98.2 F | HEIGHT: 70 IN | SYSTOLIC BLOOD PRESSURE: 169 MMHG | BODY MASS INDEX: 34.97 KG/M2 | WEIGHT: 244.27 LBS | RESPIRATION RATE: 16 BRPM

## 2024-08-19 DIAGNOSIS — I63.9 CEREBRAL INFARCTION, UNSPECIFIED: ICD-10-CM

## 2024-08-19 LAB
ALBUMIN SERPL ELPH-MCNC: 4.3 G/DL
ALP BLD-CCNC: 101 U/L
ALT SERPL-CCNC: 20 U/L
ANION GAP SERPL CALC-SCNC: 13 MMOL/L
AST SERPL-CCNC: 18 U/L
BASOPHILS # BLD AUTO: 0.02 K/UL — SIGNIFICANT CHANGE UP (ref 0–0.2)
BASOPHILS NFR BLD AUTO: 0.3 % — SIGNIFICANT CHANGE UP (ref 0–2)
BILIRUB SERPL-MCNC: 0.7 MG/DL
BUN SERPL-MCNC: 21 MG/DL
CALCIUM SERPL-MCNC: 10.9 MG/DL
CHLORIDE SERPL-SCNC: 102 MMOL/L
CO2 SERPL-SCNC: 26 MMOL/L
CREAT SERPL-MCNC: 1.22 MG/DL
EGFR: 64 ML/MIN/1.73M2
EOSINOPHIL # BLD AUTO: 0.29 K/UL — SIGNIFICANT CHANGE UP (ref 0–0.5)
EOSINOPHIL NFR BLD AUTO: 4.5 % — SIGNIFICANT CHANGE UP (ref 0–6)
GLUCOSE SERPL-MCNC: 123 MG/DL
HCT VFR BLD CALC: 43.6 % — SIGNIFICANT CHANGE UP (ref 39–50)
HGB BLD-MCNC: 14.6 G/DL — SIGNIFICANT CHANGE UP (ref 13–17)
IMM GRANULOCYTES NFR BLD AUTO: 0.5 % — SIGNIFICANT CHANGE UP (ref 0–0.9)
LYMPHOCYTES # BLD AUTO: 1.1 K/UL — SIGNIFICANT CHANGE UP (ref 1–3.3)
LYMPHOCYTES # BLD AUTO: 17.1 % — SIGNIFICANT CHANGE UP (ref 13–44)
MCHC RBC-ENTMCNC: 30.4 PG — SIGNIFICANT CHANGE UP (ref 27–34)
MCHC RBC-ENTMCNC: 33.5 G/DL — SIGNIFICANT CHANGE UP (ref 32–36)
MCV RBC AUTO: 90.8 FL — SIGNIFICANT CHANGE UP (ref 80–100)
MONOCYTES # BLD AUTO: 0.46 K/UL — SIGNIFICANT CHANGE UP (ref 0–0.9)
MONOCYTES NFR BLD AUTO: 7.1 % — SIGNIFICANT CHANGE UP (ref 2–14)
NEUTROPHILS # BLD AUTO: 4.55 K/UL — SIGNIFICANT CHANGE UP (ref 1.8–7.4)
NEUTROPHILS NFR BLD AUTO: 70.5 % — SIGNIFICANT CHANGE UP (ref 43–77)
NRBC # BLD: 0 /100 WBCS — SIGNIFICANT CHANGE UP (ref 0–0)
NRBC BLD-RTO: 0 /100 WBCS — SIGNIFICANT CHANGE UP (ref 0–0)
PLATELET # BLD AUTO: 181 K/UL — SIGNIFICANT CHANGE UP (ref 150–400)
POTASSIUM SERPL-SCNC: 4.9 MMOL/L
PROT SERPL-MCNC: 7.6 G/DL
RBC # BLD: 4.8 M/UL — SIGNIFICANT CHANGE UP (ref 4.2–5.8)
RBC # FLD: 13.8 % — SIGNIFICANT CHANGE UP (ref 10.3–14.5)
SODIUM SERPL-SCNC: 141 MMOL/L
WBC # BLD: 6.45 K/UL — SIGNIFICANT CHANGE UP (ref 3.8–10.5)
WBC # FLD AUTO: 6.45 K/UL — SIGNIFICANT CHANGE UP (ref 3.8–10.5)

## 2024-08-19 PROCEDURE — 99204 OFFICE O/P NEW MOD 45 MIN: CPT

## 2024-08-19 NOTE — HISTORY OF PRESENT ILLNESS
[de-identified] : This is a 64 year old man with a history of diabetes on Lantus and glipizide and metformin at 38 years of age, resected kidney cancer in remission,  HTN ,hyperlipidemia.  \par  He presents with the company of his wife Bel for the evaluation of a family history of Factor V Leiden. His 35 year old daughter was recently diagnosed with Factor V Leiden during a gynecologic workup.  Jonathan himself does not have a personal history of VTE.  He does describe a history of a  gum surgery had a large blood clot on the upper pallet.  Also his sister had the same problem.  \par  \osvaldo  Was a heavy smoker quit in 1987 no hx of VTE.   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 Now presenting after a new CVA. [facility] in October 2023 after syncope at a bowling [place]. He stopped his Mounjaro and Flomax. On 1/18/24, he passed out (unwitnessed) and brought to [place] for 8 day hospitalization. CTA brain showed petechial hemorrhages, concern for CO poisoning? Went to see an epileptologist, but EEGs did not show anything remarkable. Also seen at [facility] in July 2024 while on vacation with family. He passed out at [employer]. MRI brain 8/9/24 reportedly had evidence of CVA but he was not started on antiplatelet agents.

## 2024-08-19 NOTE — ASSESSMENT
[FreeTextEntry1] : This is a 69 year old man with a history of insulin dependent diabetes, hypertension, hyperlipidemia. He initially presented in 2019 for the evaluation of a family history of Factor V Leiden.  He had no personal history of thromboembolism. Presented again 8/19/24 after a CVA demonstrated.  Check CBC, CMP, APLS labs, ATIII, protein C, protein S, PGM, MTHFR. Start ASA 81mg qd for likely TIAs and now CVA. Should get a bubble study as he has only had a stress echo from 11/2023.  Patient seen and examined with Dr. Joseph.   Olayinka Jenkins M.D. Hematology/Oncology Fellow PGY-6 [facility]  I discussed this patient in a pre-clinic session with the fellow including review of clinical status and last labs.  I also saw the patient and discussed history, completed an exam and discussed the plan together with the fellow.  Total time spent today on this patient   37   minutes. This is a 69 year old man with a history of IDDM, Hypertension, hyperlipidemia, hx of a heterozygous FVL mutation  found on workup in 2019 for a family history of FVL. Patient went on to suffer repeated TIA and a CVA in January 2023.    Will run hypercoagulable state evaluation minus the FVL that was already run. Suspect that the CVA is due to the more common disorders of cardiovascular disease, diabetes, and hyperlipidemia. Recommend proceeding to PHIILP bubble study.  Already has a loop recorder placed.

## 2024-08-20 LAB
AT III PPP CHRO-ACNC: 101 %
B2 GLYCOPROT1 IGG SER-ACNC: <1.4 U/ML
B2 GLYCOPROT1 IGM SER-ACNC: 4.9 U/ML
CARDIOLIPIN IGM SER-MCNC: <1.5 MPL U/ML
CARDIOLIPIN IGM SER-MCNC: <1.6 GPL U/ML
CONFIRM: 28.4 SEC
DEPRECATED CARDIOLIPIN IGA SER: <2 APL U/ML
DRVVT IMM 1:2 NP PPP: NORMAL
DRVVT SCREEN TO CONFIRM RATIO: 0.84 RATIO
PROT C PPP CHRO-ACNC: 103 %
SCREEN DRVVT: 28.1 SEC
SILICA CLOTTING TIME INTERPRETATION: NORMAL
SILICA CLOTTING TIME S/C: 0.91 RATIO

## 2024-08-21 LAB — HLX MTHFR FINAL REPORT: NORMAL

## 2024-08-22 LAB
PROT S FREE PPP-ACNC: 71 %
PTR INTERP: NORMAL

## 2024-09-08 ENCOUNTER — NON-APPOINTMENT (OUTPATIENT)
Age: 69
End: 2024-09-08

## 2024-09-30 ENCOUNTER — NON-APPOINTMENT (OUTPATIENT)
Age: 69
End: 2024-09-30

## 2024-10-01 ENCOUNTER — APPOINTMENT (OUTPATIENT)
Dept: UROLOGY | Facility: CLINIC | Age: 69
End: 2024-10-01
Payer: MEDICARE

## 2024-10-01 ENCOUNTER — OUTPATIENT (OUTPATIENT)
Dept: OUTPATIENT SERVICES | Facility: HOSPITAL | Age: 69
LOS: 1 days | End: 2024-10-01
Payer: MEDICARE

## 2024-10-01 DIAGNOSIS — N40.1 BENIGN PROSTATIC HYPERPLASIA WITH LOWER URINARY TRACT SYMPMS: ICD-10-CM

## 2024-10-01 DIAGNOSIS — R35.0 FREQUENCY OF MICTURITION: ICD-10-CM

## 2024-10-01 DIAGNOSIS — C64.9 MALIGNANT NEOPLASM OF UNSPECIFIED KIDNEY, EXCEPT RENAL PELVIS: ICD-10-CM

## 2024-10-01 DIAGNOSIS — Z90.5 ACQUIRED ABSENCE OF KIDNEY: Chronic | ICD-10-CM

## 2024-10-01 DIAGNOSIS — N13.8 BENIGN PROSTATIC HYPERPLASIA WITH LOWER URINARY TRACT SYMPMS: ICD-10-CM

## 2024-10-01 PROCEDURE — 99203 OFFICE O/P NEW LOW 30 MIN: CPT

## 2024-10-01 PROCEDURE — 76775 US EXAM ABDO BACK WALL LIM: CPT | Mod: 26

## 2024-10-01 NOTE — ASSESSMENT
[FreeTextEntry1] : Reviewed renal US- no evidence of disease recurrence, stones or hydronephrosis PVR today 320cc (190cc in 2023; 230cc in 2022) Okay to still d/c tamsulosin in setting of syncopal episodes Recommend restarting finasteride (rx renewal sent) F/u 1 year

## 2024-10-01 NOTE — HISTORY OF PRESENT ILLNESS
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  Interval history: Since last October has had 5 episodes of passing out. Seen by [facility] neurology and is actively being worked up for root cause- concern for TIA and also dysautonomia and amyloidosis. Was recommended to stop tamsulosin and finasteride by Dr. Mendoza last winter. He feels his urinary symptoms are still bothersome but unchanged after stopping medications. Has nocturia 3-4x per night and urinary straining and incomplete emptying.   Ultrasound today: kidneys appear healthy without hydronephrosis, stable right simple cyst 1.6 cm, no evidence of recurrence after partial nephrectomy.

## 2024-10-02 DIAGNOSIS — C64.9 MALIGNANT NEOPLASM OF UNSPECIFIED KIDNEY, EXCEPT RENAL PELVIS: ICD-10-CM

## 2024-10-02 DIAGNOSIS — N40.1 BENIGN PROSTATIC HYPERPLASIA WITH LOWER URINARY TRACT SYMPTOMS: ICD-10-CM

## 2024-10-04 RX ORDER — FINASTERIDE 5 MG/1
5 TABLET, FILM COATED ORAL DAILY
Qty: 90 | Refills: 3 | Status: ACTIVE | COMMUNITY
Start: 2024-10-04 | End: 1900-01-01

## 2024-10-05 ENCOUNTER — RX RENEWAL (OUTPATIENT)
Age: 69
End: 2024-10-05

## 2024-10-08 ENCOUNTER — APPOINTMENT (OUTPATIENT)
Dept: ENDOCRINOLOGY | Facility: CLINIC | Age: 69
End: 2024-10-08
Payer: MEDICARE

## 2024-10-08 VITALS
HEART RATE: 89 BPM | DIASTOLIC BLOOD PRESSURE: 84 MMHG | SYSTOLIC BLOOD PRESSURE: 136 MMHG | WEIGHT: 246 LBS | BODY MASS INDEX: 35.3 KG/M2 | OXYGEN SATURATION: 98 %

## 2024-10-08 DIAGNOSIS — I10 ESSENTIAL (PRIMARY) HYPERTENSION: ICD-10-CM

## 2024-10-08 DIAGNOSIS — E11.29 TYPE 2 DIABETES MELLITUS WITH OTHER DIABETIC KIDNEY COMPLICATION: ICD-10-CM

## 2024-10-08 DIAGNOSIS — E78.00 PURE HYPERCHOLESTEROLEMIA, UNSPECIFIED: ICD-10-CM

## 2024-10-08 DIAGNOSIS — R80.9 TYPE 2 DIABETES MELLITUS WITH OTHER DIABETIC KIDNEY COMPLICATION: ICD-10-CM

## 2024-10-08 DIAGNOSIS — E83.52 HYPERCALCEMIA: ICD-10-CM

## 2024-10-08 DIAGNOSIS — M81.0 AGE-RELATED OSTEOPOROSIS W/OUT CURRENT PATHOLOGICAL FRACTURE: ICD-10-CM

## 2024-10-08 PROCEDURE — 99215 OFFICE O/P EST HI 40 MIN: CPT

## 2024-10-08 PROCEDURE — 95251 CONT GLUC MNTR ANALYSIS I&R: CPT

## 2024-10-08 RX ORDER — BLOOD-GLUCOSE METER
W/DEVICE EACH MISCELLANEOUS
Qty: 1 | Refills: 0 | Status: ACTIVE | COMMUNITY
Start: 2024-10-08 | End: 1900-01-01

## 2024-10-08 RX ORDER — BLOOD SUGAR DIAGNOSTIC
STRIP MISCELLANEOUS
Qty: 1 | Refills: 3 | Status: ACTIVE | COMMUNITY
Start: 2024-10-08 | End: 1900-01-01

## 2024-10-09 LAB — HBA1C MFR BLD HPLC: 6.8

## 2024-11-05 ENCOUNTER — APPOINTMENT (OUTPATIENT)
Dept: INTERNAL MEDICINE | Facility: CLINIC | Age: 69
End: 2024-11-05

## 2024-11-20 PROCEDURE — 76775 US EXAM ABDO BACK WALL LIM: CPT

## 2024-12-03 ENCOUNTER — APPOINTMENT (OUTPATIENT)
Dept: UROLOGY | Facility: CLINIC | Age: 69
End: 2024-12-03

## 2025-01-02 ENCOUNTER — RX RENEWAL (OUTPATIENT)
Age: 70
End: 2025-01-02

## 2025-01-13 ENCOUNTER — APPOINTMENT (OUTPATIENT)
Dept: DERMATOLOGY | Facility: CLINIC | Age: 70
End: 2025-01-13

## 2025-01-13 VITALS — WEIGHT: 249 LBS | BODY MASS INDEX: 35.65 KG/M2 | HEIGHT: 70 IN

## 2025-01-13 DIAGNOSIS — D17.1 BENIGN LIPOMATOUS NEOPLASM OF SKIN AND SUBCUTANEOUS TISSUE OF TRUNK: ICD-10-CM

## 2025-01-13 PROCEDURE — 99203 OFFICE O/P NEW LOW 30 MIN: CPT

## 2025-01-13 RX ORDER — LEVETIRACETAM 500 MG/1
500 TABLET, FILM COATED ORAL
Refills: 0 | Status: ACTIVE | COMMUNITY

## 2025-01-19 PROBLEM — D17.1 LIPOMA OF TORSO: Status: ACTIVE | Noted: 2025-01-19

## 2025-01-29 ENCOUNTER — RX RENEWAL (OUTPATIENT)
Age: 70
End: 2025-01-29

## 2025-01-31 ENCOUNTER — APPOINTMENT (OUTPATIENT)
Dept: ENDOCRINOLOGY | Facility: CLINIC | Age: 70
End: 2025-01-31
Payer: MEDICARE

## 2025-01-31 VITALS — WEIGHT: 248 LBS | BODY MASS INDEX: 37.15 KG/M2 | HEIGHT: 68.5 IN

## 2025-01-31 VITALS — DIASTOLIC BLOOD PRESSURE: 88 MMHG | OXYGEN SATURATION: 96 % | SYSTOLIC BLOOD PRESSURE: 160 MMHG | HEART RATE: 100 BPM

## 2025-01-31 DIAGNOSIS — E55.9 VITAMIN D DEFICIENCY, UNSPECIFIED: ICD-10-CM

## 2025-01-31 DIAGNOSIS — M81.0 AGE-RELATED OSTEOPOROSIS W/OUT CURRENT PATHOLOGICAL FRACTURE: ICD-10-CM

## 2025-01-31 DIAGNOSIS — I10 ESSENTIAL (PRIMARY) HYPERTENSION: ICD-10-CM

## 2025-01-31 DIAGNOSIS — E78.5 HYPERLIPIDEMIA, UNSPECIFIED: ICD-10-CM

## 2025-01-31 DIAGNOSIS — E83.52 HYPERCALCEMIA: ICD-10-CM

## 2025-01-31 DIAGNOSIS — E11.9 TYPE 2 DIABETES MELLITUS W/OUT COMPLICATIONS: ICD-10-CM

## 2025-01-31 PROCEDURE — 77080 DXA BONE DENSITY AXIAL: CPT | Mod: 26,GA

## 2025-01-31 PROCEDURE — ZZZZZ: CPT

## 2025-01-31 PROCEDURE — G2211 COMPLEX E/M VISIT ADD ON: CPT

## 2025-01-31 PROCEDURE — 99214 OFFICE O/P EST MOD 30 MIN: CPT

## 2025-01-31 PROCEDURE — 95251 CONT GLUC MNTR ANALYSIS I&R: CPT

## 2025-01-31 RX ORDER — ALENDRONATE SODIUM 70 MG/1
70 TABLET ORAL
Qty: 12 | Refills: 0 | Status: ACTIVE | COMMUNITY
Start: 2025-01-31 | End: 1900-01-01

## 2025-02-03 LAB
25(OH)D3 SERPL-MCNC: 27.4 NG/ML
ALBUMIN SERPL ELPH-MCNC: 4.7 G/DL
ALP BLD-CCNC: 104 U/L
ALT SERPL-CCNC: 18 U/L
ANION GAP SERPL CALC-SCNC: 15 MMOL/L
AST SERPL-CCNC: 13 U/L
BILIRUB SERPL-MCNC: 0.8 MG/DL
BUN SERPL-MCNC: 23 MG/DL
CALCIUM SERPL-MCNC: 10.9 MG/DL
CHLORIDE SERPL-SCNC: 102 MMOL/L
CO2 SERPL-SCNC: 25 MMOL/L
CREAT SERPL-MCNC: 1.25 MG/DL
EGFR: 62 ML/MIN/1.73M2
GLUCOSE SERPL-MCNC: 95 MG/DL
POTASSIUM SERPL-SCNC: 4.8 MMOL/L
PROT SERPL-MCNC: 7.5 G/DL
SODIUM SERPL-SCNC: 142 MMOL/L

## 2025-02-05 ENCOUNTER — NON-APPOINTMENT (OUTPATIENT)
Age: 70
End: 2025-02-05

## 2025-02-11 ENCOUNTER — APPOINTMENT (OUTPATIENT)
Dept: ENDOCRINOLOGY | Facility: CLINIC | Age: 70
End: 2025-02-11

## 2025-02-20 ENCOUNTER — RX RENEWAL (OUTPATIENT)
Age: 70
End: 2025-02-20

## 2025-02-21 ENCOUNTER — RX RENEWAL (OUTPATIENT)
Age: 70
End: 2025-02-21

## 2025-03-18 ENCOUNTER — APPOINTMENT (OUTPATIENT)
Dept: ENDOCRINOLOGY | Facility: CLINIC | Age: 70
End: 2025-03-18
Payer: MEDICARE

## 2025-03-18 VITALS
OXYGEN SATURATION: 96 % | BODY MASS INDEX: 37.15 KG/M2 | HEART RATE: 88 BPM | DIASTOLIC BLOOD PRESSURE: 80 MMHG | WEIGHT: 248 LBS | HEIGHT: 68.5 IN | SYSTOLIC BLOOD PRESSURE: 140 MMHG

## 2025-03-18 DIAGNOSIS — E83.52 HYPERCALCEMIA: ICD-10-CM

## 2025-03-18 DIAGNOSIS — E78.5 HYPERLIPIDEMIA, UNSPECIFIED: ICD-10-CM

## 2025-03-18 DIAGNOSIS — I10 ESSENTIAL (PRIMARY) HYPERTENSION: ICD-10-CM

## 2025-03-18 DIAGNOSIS — E11.9 TYPE 2 DIABETES MELLITUS W/OUT COMPLICATIONS: ICD-10-CM

## 2025-03-18 DIAGNOSIS — M81.0 AGE-RELATED OSTEOPOROSIS W/OUT CURRENT PATHOLOGICAL FRACTURE: ICD-10-CM

## 2025-03-18 PROCEDURE — 95251 CONT GLUC MNTR ANALYSIS I&R: CPT

## 2025-03-18 PROCEDURE — 99215 OFFICE O/P EST HI 40 MIN: CPT

## 2025-03-27 LAB
25(OH)D3 SERPL-MCNC: 30.6 NG/ML
ALBUMIN SERPL ELPH-MCNC: 4.3 G/DL
ALP BLD-CCNC: 83 U/L
ALT SERPL-CCNC: 18 U/L
ANION GAP SERPL CALC-SCNC: 13 MMOL/L
AST SERPL-CCNC: 17 U/L
BILIRUB SERPL-MCNC: 0.8 MG/DL
BUN SERPL-MCNC: 21 MG/DL
CALCIUM SERPL-MCNC: 10.2 MG/DL
CALCIUM SERPL-MCNC: 10.2 MG/DL
CHLORIDE SERPL-SCNC: 104 MMOL/L
CHOLEST SERPL-MCNC: 94 MG/DL
CO2 SERPL-SCNC: 26 MMOL/L
CREAT SERPL-MCNC: 1.05 MG/DL
CREAT SPEC-SCNC: 90 MG/DL
EGFRCR SERPLBLD CKD-EPI 2021: 77 ML/MIN/1.73M2
GLUCOSE SERPL-MCNC: 126 MG/DL
HBA1C MFR BLD HPLC: 7
HDLC SERPL-MCNC: 32 MG/DL
LDLC SERPL-MCNC: 44 MG/DL
MICROALBUMIN 24H UR DL<=1MG/L-MCNC: 29.5 MG/DL
MICROALBUMIN/CREAT 24H UR-RTO: 327 MG/G
NONHDLC SERPL-MCNC: 62 MG/DL
PARATHYROID HORMONE INTACT: 65 PG/ML
POTASSIUM SERPL-SCNC: 4.7 MMOL/L
PROT SERPL-MCNC: 7.3 G/DL
SODIUM SERPL-SCNC: 143 MMOL/L
TRIGL SERPL-MCNC: 91 MG/DL
VIT B12 SERPL-MCNC: 866 PG/ML

## 2025-03-28 ENCOUNTER — TRANSCRIPTION ENCOUNTER (OUTPATIENT)
Age: 70
End: 2025-03-28

## 2025-03-31 ENCOUNTER — TRANSCRIPTION ENCOUNTER (OUTPATIENT)
Age: 70
End: 2025-03-31

## 2025-04-07 ENCOUNTER — OUTPATIENT (OUTPATIENT)
Dept: OUTPATIENT SERVICES | Facility: HOSPITAL | Age: 70
LOS: 1 days | End: 2025-04-07
Payer: MEDICARE

## 2025-04-07 DIAGNOSIS — Z90.5 ACQUIRED ABSENCE OF KIDNEY: Chronic | ICD-10-CM

## 2025-04-07 DIAGNOSIS — L89.90 PRESSURE ULCER OF UNSPECIFIED SITE, UNSPECIFIED STAGE: ICD-10-CM

## 2025-04-07 PROCEDURE — 29445 APPL RIGID TOT CNTC LEG CAST: CPT | Mod: RT

## 2025-04-07 PROCEDURE — 99214 OFFICE O/P EST MOD 30 MIN: CPT | Mod: 25

## 2025-04-07 PROCEDURE — 99204 OFFICE O/P NEW MOD 45 MIN: CPT | Mod: 25

## 2025-04-08 DIAGNOSIS — L97.412 NON-PRESSURE CHRONIC ULCER OF RIGHT HEEL AND MIDFOOT WITH FAT LAYER EXPOSED: ICD-10-CM

## 2025-04-08 DIAGNOSIS — A52.16 CHARCOT'S ARTHROPATHY (TABETIC): ICD-10-CM

## 2025-04-08 DIAGNOSIS — M19.90 UNSPECIFIED OSTEOARTHRITIS, UNSPECIFIED SITE: ICD-10-CM

## 2025-04-08 DIAGNOSIS — I10 ESSENTIAL (PRIMARY) HYPERTENSION: ICD-10-CM

## 2025-04-08 DIAGNOSIS — E11.40 TYPE 2 DIABETES MELLITUS WITH DIABETIC NEUROPATHY, UNSPECIFIED: ICD-10-CM

## 2025-04-08 DIAGNOSIS — Z79.4 LONG TERM (CURRENT) USE OF INSULIN: ICD-10-CM

## 2025-04-08 DIAGNOSIS — E08.40 DIABETES MELLITUS DUE TO UNDERLYING CONDITION WITH DIABETIC NEUROPATHY, UNSPECIFIED: ICD-10-CM

## 2025-04-08 DIAGNOSIS — E11.621 TYPE 2 DIABETES MELLITUS WITH FOOT ULCER: ICD-10-CM

## 2025-04-08 DIAGNOSIS — Z79.899 OTHER LONG TERM (CURRENT) DRUG THERAPY: ICD-10-CM

## 2025-04-08 DIAGNOSIS — H26.9 UNSPECIFIED CATARACT: ICD-10-CM

## 2025-04-14 ENCOUNTER — OUTPATIENT (OUTPATIENT)
Dept: OUTPATIENT SERVICES | Facility: HOSPITAL | Age: 70
LOS: 1 days | End: 2025-04-14
Payer: MEDICARE

## 2025-04-14 DIAGNOSIS — L89.90 PRESSURE ULCER OF UNSPECIFIED SITE, UNSPECIFIED STAGE: ICD-10-CM

## 2025-04-14 DIAGNOSIS — Z90.5 ACQUIRED ABSENCE OF KIDNEY: Chronic | ICD-10-CM

## 2025-04-14 PROCEDURE — 11042 DBRDMT SUBQ TIS 1ST 20SQCM/<: CPT | Mod: RT

## 2025-04-15 DIAGNOSIS — Z79.899 OTHER LONG TERM (CURRENT) DRUG THERAPY: ICD-10-CM

## 2025-04-15 DIAGNOSIS — A52.16 CHARCOT'S ARTHROPATHY (TABETIC): ICD-10-CM

## 2025-04-15 DIAGNOSIS — I10 ESSENTIAL (PRIMARY) HYPERTENSION: ICD-10-CM

## 2025-04-15 DIAGNOSIS — M19.90 UNSPECIFIED OSTEOARTHRITIS, UNSPECIFIED SITE: ICD-10-CM

## 2025-04-15 DIAGNOSIS — H26.8 OTHER SPECIFIED CATARACT: ICD-10-CM

## 2025-04-15 DIAGNOSIS — L97.412 NON-PRESSURE CHRONIC ULCER OF RIGHT HEEL AND MIDFOOT WITH FAT LAYER EXPOSED: ICD-10-CM

## 2025-04-15 DIAGNOSIS — E08.40 DIABETES MELLITUS DUE TO UNDERLYING CONDITION WITH DIABETIC NEUROPATHY, UNSPECIFIED: ICD-10-CM

## 2025-04-15 DIAGNOSIS — E11.40 TYPE 2 DIABETES MELLITUS WITH DIABETIC NEUROPATHY, UNSPECIFIED: ICD-10-CM

## 2025-04-15 DIAGNOSIS — E11.621 TYPE 2 DIABETES MELLITUS WITH FOOT ULCER: ICD-10-CM

## 2025-04-15 DIAGNOSIS — Z79.4 LONG TERM (CURRENT) USE OF INSULIN: ICD-10-CM

## 2025-04-21 ENCOUNTER — OUTPATIENT (OUTPATIENT)
Dept: OUTPATIENT SERVICES | Facility: HOSPITAL | Age: 70
LOS: 1 days | End: 2025-04-21
Payer: MEDICARE

## 2025-04-21 DIAGNOSIS — L89.90 PRESSURE ULCER OF UNSPECIFIED SITE, UNSPECIFIED STAGE: ICD-10-CM

## 2025-04-21 DIAGNOSIS — Z90.5 ACQUIRED ABSENCE OF KIDNEY: Chronic | ICD-10-CM

## 2025-04-21 PROCEDURE — 29445 APPL RIGID TOT CNTC LEG CAST: CPT | Mod: RT

## 2025-04-22 DIAGNOSIS — D64.9 ANEMIA, UNSPECIFIED: ICD-10-CM

## 2025-04-22 DIAGNOSIS — E11.621 TYPE 2 DIABETES MELLITUS WITH FOOT ULCER: ICD-10-CM

## 2025-04-22 DIAGNOSIS — L97.412 NON-PRESSURE CHRONIC ULCER OF RIGHT HEEL AND MIDFOOT WITH FAT LAYER EXPOSED: ICD-10-CM

## 2025-04-22 DIAGNOSIS — Z79.899 OTHER LONG TERM (CURRENT) DRUG THERAPY: ICD-10-CM

## 2025-04-22 DIAGNOSIS — H26.8 OTHER SPECIFIED CATARACT: ICD-10-CM

## 2025-04-22 DIAGNOSIS — Z79.4 LONG TERM (CURRENT) USE OF INSULIN: ICD-10-CM

## 2025-04-22 DIAGNOSIS — A52.16 CHARCOT'S ARTHROPATHY (TABETIC): ICD-10-CM

## 2025-04-22 DIAGNOSIS — E08.40 DIABETES MELLITUS DUE TO UNDERLYING CONDITION WITH DIABETIC NEUROPATHY, UNSPECIFIED: ICD-10-CM

## 2025-04-22 DIAGNOSIS — I10 ESSENTIAL (PRIMARY) HYPERTENSION: ICD-10-CM

## 2025-04-22 DIAGNOSIS — E11.40 TYPE 2 DIABETES MELLITUS WITH DIABETIC NEUROPATHY, UNSPECIFIED: ICD-10-CM

## 2025-04-28 ENCOUNTER — OUTPATIENT (OUTPATIENT)
Dept: OUTPATIENT SERVICES | Facility: HOSPITAL | Age: 70
LOS: 1 days | End: 2025-04-28
Payer: MEDICARE

## 2025-04-28 ENCOUNTER — RX RENEWAL (OUTPATIENT)
Age: 70
End: 2025-04-28

## 2025-04-28 DIAGNOSIS — Z90.5 ACQUIRED ABSENCE OF KIDNEY: Chronic | ICD-10-CM

## 2025-04-28 DIAGNOSIS — L89.90 PRESSURE ULCER OF UNSPECIFIED SITE, UNSPECIFIED STAGE: ICD-10-CM

## 2025-04-28 PROCEDURE — 99213 OFFICE O/P EST LOW 20 MIN: CPT

## 2025-04-29 DIAGNOSIS — E11.621 TYPE 2 DIABETES MELLITUS WITH FOOT ULCER: ICD-10-CM

## 2025-04-29 DIAGNOSIS — Z79.899 OTHER LONG TERM (CURRENT) DRUG THERAPY: ICD-10-CM

## 2025-04-29 DIAGNOSIS — H26.8 OTHER SPECIFIED CATARACT: ICD-10-CM

## 2025-04-29 DIAGNOSIS — I10 ESSENTIAL (PRIMARY) HYPERTENSION: ICD-10-CM

## 2025-04-29 DIAGNOSIS — D64.9 ANEMIA, UNSPECIFIED: ICD-10-CM

## 2025-04-29 DIAGNOSIS — A52.16 CHARCOT'S ARTHROPATHY (TABETIC): ICD-10-CM

## 2025-04-29 DIAGNOSIS — E11.40 TYPE 2 DIABETES MELLITUS WITH DIABETIC NEUROPATHY, UNSPECIFIED: ICD-10-CM

## 2025-04-29 DIAGNOSIS — L97.412 NON-PRESSURE CHRONIC ULCER OF RIGHT HEEL AND MIDFOOT WITH FAT LAYER EXPOSED: ICD-10-CM

## 2025-04-29 DIAGNOSIS — Z79.4 LONG TERM (CURRENT) USE OF INSULIN: ICD-10-CM

## 2025-04-29 DIAGNOSIS — E08.40 DIABETES MELLITUS DUE TO UNDERLYING CONDITION WITH DIABETIC NEUROPATHY, UNSPECIFIED: ICD-10-CM

## 2025-05-19 ENCOUNTER — OUTPATIENT (OUTPATIENT)
Dept: OUTPATIENT SERVICES | Facility: HOSPITAL | Age: 70
LOS: 1 days | End: 2025-05-19
Payer: MEDICARE

## 2025-05-19 DIAGNOSIS — Z90.5 ACQUIRED ABSENCE OF KIDNEY: Chronic | ICD-10-CM

## 2025-05-19 DIAGNOSIS — L89.90 PRESSURE ULCER OF UNSPECIFIED SITE, UNSPECIFIED STAGE: ICD-10-CM

## 2025-05-19 PROCEDURE — 99213 OFFICE O/P EST LOW 20 MIN: CPT

## 2025-05-29 DIAGNOSIS — H26.8 OTHER SPECIFIED CATARACT: ICD-10-CM

## 2025-05-29 DIAGNOSIS — E11.621 TYPE 2 DIABETES MELLITUS WITH FOOT ULCER: ICD-10-CM

## 2025-05-29 DIAGNOSIS — Z79.899 OTHER LONG TERM (CURRENT) DRUG THERAPY: ICD-10-CM

## 2025-05-29 DIAGNOSIS — D64.9 ANEMIA, UNSPECIFIED: ICD-10-CM

## 2025-05-29 DIAGNOSIS — E08.40 DIABETES MELLITUS DUE TO UNDERLYING CONDITION WITH DIABETIC NEUROPATHY, UNSPECIFIED: ICD-10-CM

## 2025-05-29 DIAGNOSIS — L97.412 NON-PRESSURE CHRONIC ULCER OF RIGHT HEEL AND MIDFOOT WITH FAT LAYER EXPOSED: ICD-10-CM

## 2025-05-29 DIAGNOSIS — E11.40 TYPE 2 DIABETES MELLITUS WITH DIABETIC NEUROPATHY, UNSPECIFIED: ICD-10-CM

## 2025-05-29 DIAGNOSIS — Z79.4 LONG TERM (CURRENT) USE OF INSULIN: ICD-10-CM

## 2025-05-29 DIAGNOSIS — I10 ESSENTIAL (PRIMARY) HYPERTENSION: ICD-10-CM

## 2025-05-29 DIAGNOSIS — A52.16 CHARCOT'S ARTHROPATHY (TABETIC): ICD-10-CM

## 2025-06-02 ENCOUNTER — RESULT REVIEW (OUTPATIENT)
Age: 70
End: 2025-06-02

## 2025-06-02 ENCOUNTER — OUTPATIENT (OUTPATIENT)
Dept: OUTPATIENT SERVICES | Facility: HOSPITAL | Age: 70
LOS: 1 days | End: 2025-06-02
Payer: MEDICARE

## 2025-06-02 DIAGNOSIS — Z90.5 ACQUIRED ABSENCE OF KIDNEY: Chronic | ICD-10-CM

## 2025-06-02 DIAGNOSIS — L89.90 PRESSURE ULCER OF UNSPECIFIED SITE, UNSPECIFIED STAGE: ICD-10-CM

## 2025-06-02 PROCEDURE — 29445 APPL RIGID TOT CNTC LEG CAST: CPT | Mod: RT,XU

## 2025-06-02 PROCEDURE — 88304 TISSUE EXAM BY PATHOLOGIST: CPT | Mod: 26

## 2025-06-02 PROCEDURE — 11042 DBRDMT SUBQ TIS 1ST 20SQCM/<: CPT | Mod: RT

## 2025-06-02 PROCEDURE — 99213 OFFICE O/P EST LOW 20 MIN: CPT | Mod: 25

## 2025-06-03 DIAGNOSIS — L97.412 NON-PRESSURE CHRONIC ULCER OF RIGHT HEEL AND MIDFOOT WITH FAT LAYER EXPOSED: ICD-10-CM

## 2025-06-03 DIAGNOSIS — Z79.899 OTHER LONG TERM (CURRENT) DRUG THERAPY: ICD-10-CM

## 2025-06-03 DIAGNOSIS — E11.40 TYPE 2 DIABETES MELLITUS WITH DIABETIC NEUROPATHY, UNSPECIFIED: ICD-10-CM

## 2025-06-03 DIAGNOSIS — Z79.4 LONG TERM (CURRENT) USE OF INSULIN: ICD-10-CM

## 2025-06-03 DIAGNOSIS — D64.9 ANEMIA, UNSPECIFIED: ICD-10-CM

## 2025-06-03 DIAGNOSIS — H26.8 OTHER SPECIFIED CATARACT: ICD-10-CM

## 2025-06-03 DIAGNOSIS — E08.40 DIABETES MELLITUS DUE TO UNDERLYING CONDITION WITH DIABETIC NEUROPATHY, UNSPECIFIED: ICD-10-CM

## 2025-06-03 DIAGNOSIS — E11.621 TYPE 2 DIABETES MELLITUS WITH FOOT ULCER: ICD-10-CM

## 2025-06-03 DIAGNOSIS — I10 ESSENTIAL (PRIMARY) HYPERTENSION: ICD-10-CM

## 2025-06-03 LAB — SURGICAL PATHOLOGY STUDY: SIGNIFICANT CHANGE UP

## 2025-06-09 ENCOUNTER — OUTPATIENT (OUTPATIENT)
Dept: OUTPATIENT SERVICES | Facility: HOSPITAL | Age: 70
LOS: 1 days | End: 2025-06-09
Payer: MEDICARE

## 2025-06-09 DIAGNOSIS — Z90.5 ACQUIRED ABSENCE OF KIDNEY: Chronic | ICD-10-CM

## 2025-06-09 DIAGNOSIS — L89.90 PRESSURE ULCER OF UNSPECIFIED SITE, UNSPECIFIED STAGE: ICD-10-CM

## 2025-06-09 PROCEDURE — 99213 OFFICE O/P EST LOW 20 MIN: CPT

## 2025-06-10 DIAGNOSIS — E11.40 TYPE 2 DIABETES MELLITUS WITH DIABETIC NEUROPATHY, UNSPECIFIED: ICD-10-CM

## 2025-06-10 DIAGNOSIS — E11.621 TYPE 2 DIABETES MELLITUS WITH FOOT ULCER: ICD-10-CM

## 2025-06-10 DIAGNOSIS — Z79.899 OTHER LONG TERM (CURRENT) DRUG THERAPY: ICD-10-CM

## 2025-06-10 DIAGNOSIS — H26.8 OTHER SPECIFIED CATARACT: ICD-10-CM

## 2025-06-10 DIAGNOSIS — Z79.4 LONG TERM (CURRENT) USE OF INSULIN: ICD-10-CM

## 2025-06-10 DIAGNOSIS — E08.40 DIABETES MELLITUS DUE TO UNDERLYING CONDITION WITH DIABETIC NEUROPATHY, UNSPECIFIED: ICD-10-CM

## 2025-06-10 DIAGNOSIS — L97.412 NON-PRESSURE CHRONIC ULCER OF RIGHT HEEL AND MIDFOOT WITH FAT LAYER EXPOSED: ICD-10-CM

## 2025-06-10 DIAGNOSIS — D64.9 ANEMIA, UNSPECIFIED: ICD-10-CM

## 2025-06-10 DIAGNOSIS — I10 ESSENTIAL (PRIMARY) HYPERTENSION: ICD-10-CM

## 2025-06-10 DIAGNOSIS — A52.16 CHARCOT'S ARTHROPATHY (TABETIC): ICD-10-CM

## 2025-06-16 ENCOUNTER — OUTPATIENT (OUTPATIENT)
Dept: OUTPATIENT SERVICES | Facility: HOSPITAL | Age: 70
LOS: 1 days | End: 2025-06-16
Payer: MEDICARE

## 2025-06-16 DIAGNOSIS — L89.90 PRESSURE ULCER OF UNSPECIFIED SITE, UNSPECIFIED STAGE: ICD-10-CM

## 2025-06-16 DIAGNOSIS — Z90.5 ACQUIRED ABSENCE OF KIDNEY: Chronic | ICD-10-CM

## 2025-06-16 PROCEDURE — 99213 OFFICE O/P EST LOW 20 MIN: CPT | Mod: 25

## 2025-06-16 PROCEDURE — 11042 DBRDMT SUBQ TIS 1ST 20SQCM/<: CPT | Mod: RT

## 2025-06-16 PROCEDURE — 29445 APPL RIGID TOT CNTC LEG CAST: CPT | Mod: XU,RT

## 2025-06-17 ENCOUNTER — APPOINTMENT (OUTPATIENT)
Dept: ENDOCRINOLOGY | Facility: CLINIC | Age: 70
End: 2025-06-17

## 2025-06-17 VITALS
SYSTOLIC BLOOD PRESSURE: 128 MMHG | DIASTOLIC BLOOD PRESSURE: 80 MMHG | OXYGEN SATURATION: 96 % | HEART RATE: 86 BPM | WEIGHT: 250 LBS | HEIGHT: 68.5 IN | BODY MASS INDEX: 37.46 KG/M2

## 2025-06-17 DIAGNOSIS — L97.412 NON-PRESSURE CHRONIC ULCER OF RIGHT HEEL AND MIDFOOT WITH FAT LAYER EXPOSED: ICD-10-CM

## 2025-06-17 DIAGNOSIS — Z79.4 LONG TERM (CURRENT) USE OF INSULIN: ICD-10-CM

## 2025-06-17 DIAGNOSIS — A52.16 CHARCOT'S ARTHROPATHY (TABETIC): ICD-10-CM

## 2025-06-17 DIAGNOSIS — H26.9 UNSPECIFIED CATARACT: ICD-10-CM

## 2025-06-17 DIAGNOSIS — I10 ESSENTIAL (PRIMARY) HYPERTENSION: ICD-10-CM

## 2025-06-17 DIAGNOSIS — E11.40 TYPE 2 DIABETES MELLITUS WITH DIABETIC NEUROPATHY, UNSPECIFIED: ICD-10-CM

## 2025-06-17 DIAGNOSIS — D64.9 ANEMIA, UNSPECIFIED: ICD-10-CM

## 2025-06-17 DIAGNOSIS — Z79.899 OTHER LONG TERM (CURRENT) DRUG THERAPY: ICD-10-CM

## 2025-06-17 DIAGNOSIS — E08.40 DIABETES MELLITUS DUE TO UNDERLYING CONDITION WITH DIABETIC NEUROPATHY, UNSPECIFIED: ICD-10-CM

## 2025-06-17 DIAGNOSIS — E11.621 TYPE 2 DIABETES MELLITUS WITH FOOT ULCER: ICD-10-CM

## 2025-06-17 LAB — HBA1C MFR BLD HPLC: 7.2

## 2025-06-17 PROCEDURE — 95251 CONT GLUC MNTR ANALYSIS I&R: CPT

## 2025-06-17 PROCEDURE — 99214 OFFICE O/P EST MOD 30 MIN: CPT

## 2025-06-17 PROCEDURE — 83036 HEMOGLOBIN GLYCOSYLATED A1C: CPT | Mod: QW

## 2025-06-23 ENCOUNTER — OUTPATIENT (OUTPATIENT)
Dept: OUTPATIENT SERVICES | Facility: HOSPITAL | Age: 70
LOS: 1 days | End: 2025-06-23
Payer: MEDICARE

## 2025-06-23 DIAGNOSIS — L89.90 PRESSURE ULCER OF UNSPECIFIED SITE, UNSPECIFIED STAGE: ICD-10-CM

## 2025-06-23 DIAGNOSIS — Z90.5 ACQUIRED ABSENCE OF KIDNEY: Chronic | ICD-10-CM

## 2025-06-23 PROCEDURE — 99213 OFFICE O/P EST LOW 20 MIN: CPT

## 2025-06-24 DIAGNOSIS — Z79.899 OTHER LONG TERM (CURRENT) DRUG THERAPY: ICD-10-CM

## 2025-06-24 DIAGNOSIS — A52.16 CHARCOT'S ARTHROPATHY (TABETIC): ICD-10-CM

## 2025-06-24 DIAGNOSIS — E11.621 TYPE 2 DIABETES MELLITUS WITH FOOT ULCER: ICD-10-CM

## 2025-06-24 DIAGNOSIS — Z79.4 LONG TERM (CURRENT) USE OF INSULIN: ICD-10-CM

## 2025-06-24 DIAGNOSIS — E11.40 TYPE 2 DIABETES MELLITUS WITH DIABETIC NEUROPATHY, UNSPECIFIED: ICD-10-CM

## 2025-06-24 DIAGNOSIS — L97.412 NON-PRESSURE CHRONIC ULCER OF RIGHT HEEL AND MIDFOOT WITH FAT LAYER EXPOSED: ICD-10-CM

## 2025-06-24 DIAGNOSIS — I10 ESSENTIAL (PRIMARY) HYPERTENSION: ICD-10-CM

## 2025-06-24 DIAGNOSIS — H26.8 OTHER SPECIFIED CATARACT: ICD-10-CM

## 2025-06-24 DIAGNOSIS — D64.9 ANEMIA, UNSPECIFIED: ICD-10-CM

## 2025-06-24 DIAGNOSIS — E08.40 DIABETES MELLITUS DUE TO UNDERLYING CONDITION WITH DIABETIC NEUROPATHY, UNSPECIFIED: ICD-10-CM

## 2025-06-30 ENCOUNTER — RX RENEWAL (OUTPATIENT)
Age: 70
End: 2025-06-30

## 2025-07-01 ENCOUNTER — OUTPATIENT (OUTPATIENT)
Dept: OUTPATIENT SERVICES | Facility: HOSPITAL | Age: 70
LOS: 1 days | End: 2025-07-01

## 2025-07-01 DIAGNOSIS — Z90.5 ACQUIRED ABSENCE OF KIDNEY: Chronic | ICD-10-CM

## 2025-07-01 DIAGNOSIS — L89.90 PRESSURE ULCER OF UNSPECIFIED SITE, UNSPECIFIED STAGE: ICD-10-CM

## 2025-07-08 DIAGNOSIS — H26.8 OTHER SPECIFIED CATARACT: ICD-10-CM

## 2025-07-08 DIAGNOSIS — I10 ESSENTIAL (PRIMARY) HYPERTENSION: ICD-10-CM

## 2025-07-08 DIAGNOSIS — E08.40 DIABETES MELLITUS DUE TO UNDERLYING CONDITION WITH DIABETIC NEUROPATHY, UNSPECIFIED: ICD-10-CM

## 2025-07-08 DIAGNOSIS — E11.40 TYPE 2 DIABETES MELLITUS WITH DIABETIC NEUROPATHY, UNSPECIFIED: ICD-10-CM

## 2025-07-08 DIAGNOSIS — L97.412 NON-PRESSURE CHRONIC ULCER OF RIGHT HEEL AND MIDFOOT WITH FAT LAYER EXPOSED: ICD-10-CM

## 2025-07-08 DIAGNOSIS — A52.16 CHARCOT'S ARTHROPATHY (TABETIC): ICD-10-CM

## 2025-07-08 DIAGNOSIS — E11.621 TYPE 2 DIABETES MELLITUS WITH FOOT ULCER: ICD-10-CM

## 2025-07-08 DIAGNOSIS — D64.9 ANEMIA, UNSPECIFIED: ICD-10-CM

## 2025-07-08 DIAGNOSIS — Z79.899 OTHER LONG TERM (CURRENT) DRUG THERAPY: ICD-10-CM

## 2025-07-08 DIAGNOSIS — Z79.4 LONG TERM (CURRENT) USE OF INSULIN: ICD-10-CM

## 2025-07-14 ENCOUNTER — OUTPATIENT (OUTPATIENT)
Dept: OUTPATIENT SERVICES | Facility: HOSPITAL | Age: 70
LOS: 1 days | End: 2025-07-14
Payer: MEDICARE

## 2025-07-14 DIAGNOSIS — L89.90 PRESSURE ULCER OF UNSPECIFIED SITE, UNSPECIFIED STAGE: ICD-10-CM

## 2025-07-14 DIAGNOSIS — Z90.5 ACQUIRED ABSENCE OF KIDNEY: Chronic | ICD-10-CM

## 2025-07-14 LAB
GRAM STN FLD: ABNORMAL
SPECIMEN SOURCE: SIGNIFICANT CHANGE UP

## 2025-07-14 PROCEDURE — 99213 OFFICE O/P EST LOW 20 MIN: CPT | Mod: 25

## 2025-07-14 PROCEDURE — 11402 EXC TR-EXT B9+MARG 1.1-2 CM: CPT | Mod: RT

## 2025-07-15 DIAGNOSIS — E11.621 TYPE 2 DIABETES MELLITUS WITH FOOT ULCER: ICD-10-CM

## 2025-07-15 DIAGNOSIS — E11.40 TYPE 2 DIABETES MELLITUS WITH DIABETIC NEUROPATHY, UNSPECIFIED: ICD-10-CM

## 2025-07-15 DIAGNOSIS — A52.16 CHARCOT'S ARTHROPATHY (TABETIC): ICD-10-CM

## 2025-07-15 DIAGNOSIS — D64.9 ANEMIA, UNSPECIFIED: ICD-10-CM

## 2025-07-15 DIAGNOSIS — H26.8 OTHER SPECIFIED CATARACT: ICD-10-CM

## 2025-07-15 DIAGNOSIS — E08.40 DIABETES MELLITUS DUE TO UNDERLYING CONDITION WITH DIABETIC NEUROPATHY, UNSPECIFIED: ICD-10-CM

## 2025-07-15 DIAGNOSIS — Z79.899 OTHER LONG TERM (CURRENT) DRUG THERAPY: ICD-10-CM

## 2025-07-15 DIAGNOSIS — L97.412 NON-PRESSURE CHRONIC ULCER OF RIGHT HEEL AND MIDFOOT WITH FAT LAYER EXPOSED: ICD-10-CM

## 2025-07-15 DIAGNOSIS — I10 ESSENTIAL (PRIMARY) HYPERTENSION: ICD-10-CM

## 2025-07-16 LAB
-  AMOXICILLIN/CLAVULANIC ACID: SIGNIFICANT CHANGE UP
-  AMPICILLIN/SULBACTAM: SIGNIFICANT CHANGE UP
-  AMPICILLIN: SIGNIFICANT CHANGE UP
-  AMPICILLIN: SIGNIFICANT CHANGE UP
-  AZTREONAM: SIGNIFICANT CHANGE UP
-  CEFAZOLIN: SIGNIFICANT CHANGE UP
-  CEFEPIME: SIGNIFICANT CHANGE UP
-  CEFOXITIN: SIGNIFICANT CHANGE UP
-  CEFTRIAXONE: SIGNIFICANT CHANGE UP
-  CIPROFLOXACIN: SIGNIFICANT CHANGE UP
-  CLINDAMYCIN: SIGNIFICANT CHANGE UP
-  ERTAPENEM: SIGNIFICANT CHANGE UP
-  ERYTHROMYCIN: SIGNIFICANT CHANGE UP
-  GENTAMICIN: SIGNIFICANT CHANGE UP
-  GENTAMICIN: SIGNIFICANT CHANGE UP
-  IMIPENEM: SIGNIFICANT CHANGE UP
-  LEVOFLOXACIN: SIGNIFICANT CHANGE UP
-  MEROPENEM: SIGNIFICANT CHANGE UP
-  OXACILLIN: SIGNIFICANT CHANGE UP
-  PENICILLIN: SIGNIFICANT CHANGE UP
-  PIPERACILLIN/TAZOBACTAM: SIGNIFICANT CHANGE UP
-  RIFAMPIN: SIGNIFICANT CHANGE UP
-  TETRACYCLINE: SIGNIFICANT CHANGE UP
-  TIGECYCLINE: SIGNIFICANT CHANGE UP
-  TOBRAMYCIN: SIGNIFICANT CHANGE UP
-  TRIMETHOPRIM/SULFAMETHOXAZOLE: SIGNIFICANT CHANGE UP
-  TRIMETHOPRIM/SULFAMETHOXAZOLE: SIGNIFICANT CHANGE UP
-  VANCOMYCIN: SIGNIFICANT CHANGE UP
-  VANCOMYCIN: SIGNIFICANT CHANGE UP
CULTURE RESULTS: ABNORMAL
GRAM STN FLD: ABNORMAL
METHOD TYPE: SIGNIFICANT CHANGE UP
ORGANISM # SPEC MICROSCOPIC CNT: ABNORMAL
ORGANISM # SPEC MICROSCOPIC CNT: SIGNIFICANT CHANGE UP
SPECIMEN SOURCE: SIGNIFICANT CHANGE UP

## 2025-07-17 ENCOUNTER — APPOINTMENT (OUTPATIENT)
Dept: NEPHROLOGY | Facility: CLINIC | Age: 70
End: 2025-07-17
Payer: MEDICARE

## 2025-07-17 VITALS
OXYGEN SATURATION: 94 % | BODY MASS INDEX: 37.15 KG/M2 | HEIGHT: 68.5 IN | SYSTOLIC BLOOD PRESSURE: 160 MMHG | WEIGHT: 248 LBS | HEART RATE: 96 BPM | DIASTOLIC BLOOD PRESSURE: 93 MMHG | TEMPERATURE: 97.2 F

## 2025-07-17 VITALS
WEIGHT: 248 LBS | HEART RATE: 70 BPM | DIASTOLIC BLOOD PRESSURE: 70 MMHG | SYSTOLIC BLOOD PRESSURE: 150 MMHG | BODY MASS INDEX: 35.5 KG/M2 | HEIGHT: 70 IN

## 2025-07-17 PROCEDURE — 99205 OFFICE O/P NEW HI 60 MIN: CPT

## 2025-07-17 PROCEDURE — G2211 COMPLEX E/M VISIT ADD ON: CPT

## 2025-07-17 RX ORDER — SENNOSIDES 8.6 MG
TABLET ORAL
Refills: 0 | Status: ACTIVE | COMMUNITY

## 2025-07-18 LAB
ALBUMIN, RANDOM URINE: 10.8 MG/DL
APPEARANCE: CLEAR
BACTERIA: NEGATIVE /HPF
BILIRUBIN URINE: NEGATIVE
BLOOD URINE: NEGATIVE
CAST: 0 /LPF
COLOR: YELLOW
CREAT SPEC-SCNC: 57 MG/DL
EPITHELIAL CELLS: 0 /HPF
GLUCOSE QUALITATIVE U: NEGATIVE MG/DL
KETONES URINE: NEGATIVE MG/DL
LEUKOCYTE ESTERASE URINE: NEGATIVE
MICROALBUMIN/CREAT 24H UR-RTO: 189 MG/G
MICROSCOPIC-UA: NORMAL
NITRITE URINE: NEGATIVE
PH URINE: 5.5
PROTEIN URINE: 30 MG/DL
RED BLOOD CELLS URINE: 0 /HPF
SPECIFIC GRAVITY URINE: 1.01
UROBILINOGEN URINE: 0.2 MG/DL
WHITE BLOOD CELLS URINE: 0 /HPF

## 2025-07-21 ENCOUNTER — OUTPATIENT (OUTPATIENT)
Dept: OUTPATIENT SERVICES | Facility: HOSPITAL | Age: 70
LOS: 1 days | End: 2025-07-21
Payer: MEDICARE

## 2025-07-21 DIAGNOSIS — L89.90 PRESSURE ULCER OF UNSPECIFIED SITE, UNSPECIFIED STAGE: ICD-10-CM

## 2025-07-21 DIAGNOSIS — Z90.5 ACQUIRED ABSENCE OF KIDNEY: Chronic | ICD-10-CM

## 2025-07-21 PROCEDURE — 99024 POSTOP FOLLOW-UP VISIT: CPT

## 2025-07-22 DIAGNOSIS — H26.8 OTHER SPECIFIED CATARACT: ICD-10-CM

## 2025-07-22 DIAGNOSIS — L97.412 NON-PRESSURE CHRONIC ULCER OF RIGHT HEEL AND MIDFOOT WITH FAT LAYER EXPOSED: ICD-10-CM

## 2025-07-22 DIAGNOSIS — E11.621 TYPE 2 DIABETES MELLITUS WITH FOOT ULCER: ICD-10-CM

## 2025-07-22 DIAGNOSIS — D64.9 ANEMIA, UNSPECIFIED: ICD-10-CM

## 2025-07-22 DIAGNOSIS — A52.16 CHARCOT'S ARTHROPATHY (TABETIC): ICD-10-CM

## 2025-07-22 DIAGNOSIS — E11.40 TYPE 2 DIABETES MELLITUS WITH DIABETIC NEUROPATHY, UNSPECIFIED: ICD-10-CM

## 2025-07-22 DIAGNOSIS — Z79.899 OTHER LONG TERM (CURRENT) DRUG THERAPY: ICD-10-CM

## 2025-07-22 DIAGNOSIS — E08.40 DIABETES MELLITUS DUE TO UNDERLYING CONDITION WITH DIABETIC NEUROPATHY, UNSPECIFIED: ICD-10-CM

## 2025-07-22 DIAGNOSIS — I10 ESSENTIAL (PRIMARY) HYPERTENSION: ICD-10-CM

## 2025-07-28 ENCOUNTER — OUTPATIENT (OUTPATIENT)
Dept: OUTPATIENT SERVICES | Facility: HOSPITAL | Age: 70
LOS: 1 days | End: 2025-07-28
Payer: MEDICARE

## 2025-07-28 DIAGNOSIS — Z90.5 ACQUIRED ABSENCE OF KIDNEY: Chronic | ICD-10-CM

## 2025-07-28 DIAGNOSIS — L89.90 PRESSURE ULCER OF UNSPECIFIED SITE, UNSPECIFIED STAGE: ICD-10-CM

## 2025-07-28 PROCEDURE — 17250 CHEM CAUT OF GRANLTJ TISSUE: CPT | Mod: RT

## 2025-07-30 DIAGNOSIS — E08.40 DIABETES MELLITUS DUE TO UNDERLYING CONDITION WITH DIABETIC NEUROPATHY, UNSPECIFIED: ICD-10-CM

## 2025-07-30 DIAGNOSIS — I10 ESSENTIAL (PRIMARY) HYPERTENSION: ICD-10-CM

## 2025-07-30 DIAGNOSIS — E11.621 TYPE 2 DIABETES MELLITUS WITH FOOT ULCER: ICD-10-CM

## 2025-07-30 DIAGNOSIS — Z79.4 LONG TERM (CURRENT) USE OF INSULIN: ICD-10-CM

## 2025-07-30 DIAGNOSIS — A52.16 CHARCOT'S ARTHROPATHY (TABETIC): ICD-10-CM

## 2025-07-30 DIAGNOSIS — H26.8 OTHER SPECIFIED CATARACT: ICD-10-CM

## 2025-07-30 DIAGNOSIS — E11.40 TYPE 2 DIABETES MELLITUS WITH DIABETIC NEUROPATHY, UNSPECIFIED: ICD-10-CM

## 2025-07-30 DIAGNOSIS — Z79.899 OTHER LONG TERM (CURRENT) DRUG THERAPY: ICD-10-CM

## 2025-07-30 DIAGNOSIS — L97.412 NON-PRESSURE CHRONIC ULCER OF RIGHT HEEL AND MIDFOOT WITH FAT LAYER EXPOSED: ICD-10-CM

## 2025-07-30 DIAGNOSIS — L92.8 OTHER GRANULOMATOUS DISORDERS OF THE SKIN AND SUBCUTANEOUS TISSUE: ICD-10-CM

## 2025-07-30 DIAGNOSIS — D64.9 ANEMIA, UNSPECIFIED: ICD-10-CM

## 2025-08-04 ENCOUNTER — OUTPATIENT (OUTPATIENT)
Dept: OUTPATIENT SERVICES | Facility: HOSPITAL | Age: 70
LOS: 1 days | End: 2025-08-04
Payer: MEDICARE

## 2025-08-04 DIAGNOSIS — L89.90 PRESSURE ULCER OF UNSPECIFIED SITE, UNSPECIFIED STAGE: ICD-10-CM

## 2025-08-04 DIAGNOSIS — Z90.5 ACQUIRED ABSENCE OF KIDNEY: Chronic | ICD-10-CM

## 2025-08-04 PROCEDURE — 11042 DBRDMT SUBQ TIS 1ST 20SQCM/<: CPT | Mod: RT

## 2025-08-05 DIAGNOSIS — Z79.4 LONG TERM (CURRENT) USE OF INSULIN: ICD-10-CM

## 2025-08-05 DIAGNOSIS — H26.8 OTHER SPECIFIED CATARACT: ICD-10-CM

## 2025-08-05 DIAGNOSIS — E11.40 TYPE 2 DIABETES MELLITUS WITH DIABETIC NEUROPATHY, UNSPECIFIED: ICD-10-CM

## 2025-08-05 DIAGNOSIS — A52.16 CHARCOT'S ARTHROPATHY (TABETIC): ICD-10-CM

## 2025-08-05 DIAGNOSIS — I10 ESSENTIAL (PRIMARY) HYPERTENSION: ICD-10-CM

## 2025-08-05 DIAGNOSIS — L97.412 NON-PRESSURE CHRONIC ULCER OF RIGHT HEEL AND MIDFOOT WITH FAT LAYER EXPOSED: ICD-10-CM

## 2025-08-05 DIAGNOSIS — Z79.899 OTHER LONG TERM (CURRENT) DRUG THERAPY: ICD-10-CM

## 2025-08-05 DIAGNOSIS — E11.621 TYPE 2 DIABETES MELLITUS WITH FOOT ULCER: ICD-10-CM

## 2025-08-05 DIAGNOSIS — E08.40 DIABETES MELLITUS DUE TO UNDERLYING CONDITION WITH DIABETIC NEUROPATHY, UNSPECIFIED: ICD-10-CM

## 2025-08-05 DIAGNOSIS — D64.9 ANEMIA, UNSPECIFIED: ICD-10-CM

## 2025-08-25 ENCOUNTER — OUTPATIENT (OUTPATIENT)
Dept: OUTPATIENT SERVICES | Facility: HOSPITAL | Age: 70
LOS: 1 days | End: 2025-08-25
Payer: MEDICARE

## 2025-08-25 DIAGNOSIS — Z90.5 ACQUIRED ABSENCE OF KIDNEY: Chronic | ICD-10-CM

## 2025-08-25 DIAGNOSIS — L89.90 PRESSURE ULCER OF UNSPECIFIED SITE, UNSPECIFIED STAGE: ICD-10-CM

## 2025-08-25 PROCEDURE — 99213 OFFICE O/P EST LOW 20 MIN: CPT | Mod: 25

## 2025-08-25 PROCEDURE — 11042 DBRDMT SUBQ TIS 1ST 20SQCM/<: CPT | Mod: RT

## 2025-08-26 DIAGNOSIS — I10 ESSENTIAL (PRIMARY) HYPERTENSION: ICD-10-CM

## 2025-08-26 DIAGNOSIS — Z79.899 OTHER LONG TERM (CURRENT) DRUG THERAPY: ICD-10-CM

## 2025-08-26 DIAGNOSIS — E11.621 TYPE 2 DIABETES MELLITUS WITH FOOT ULCER: ICD-10-CM

## 2025-08-26 DIAGNOSIS — H26.8 OTHER SPECIFIED CATARACT: ICD-10-CM

## 2025-08-26 DIAGNOSIS — L97.412 NON-PRESSURE CHRONIC ULCER OF RIGHT HEEL AND MIDFOOT WITH FAT LAYER EXPOSED: ICD-10-CM

## 2025-08-26 DIAGNOSIS — E11.40 TYPE 2 DIABETES MELLITUS WITH DIABETIC NEUROPATHY, UNSPECIFIED: ICD-10-CM

## 2025-08-26 DIAGNOSIS — D64.9 ANEMIA, UNSPECIFIED: ICD-10-CM

## 2025-08-26 DIAGNOSIS — E08.40 DIABETES MELLITUS DUE TO UNDERLYING CONDITION WITH DIABETIC NEUROPATHY, UNSPECIFIED: ICD-10-CM

## 2025-08-26 DIAGNOSIS — A52.16 CHARCOT'S ARTHROPATHY (TABETIC): ICD-10-CM
